# Patient Record
Sex: MALE | Race: WHITE | NOT HISPANIC OR LATINO
[De-identification: names, ages, dates, MRNs, and addresses within clinical notes are randomized per-mention and may not be internally consistent; named-entity substitution may affect disease eponyms.]

---

## 2019-04-04 ENCOUNTER — APPOINTMENT (OUTPATIENT)
Dept: PULMONOLOGY | Facility: CLINIC | Age: 74
End: 2019-04-04
Payer: MEDICARE

## 2019-04-04 VITALS
DIASTOLIC BLOOD PRESSURE: 81 MMHG | OXYGEN SATURATION: 92 % | RESPIRATION RATE: 16 BRPM | BODY MASS INDEX: 26.6 KG/M2 | HEART RATE: 76 BPM | SYSTOLIC BLOOD PRESSURE: 169 MMHG | HEIGHT: 71 IN | WEIGHT: 190 LBS | TEMPERATURE: 97.7 F

## 2019-04-04 PROCEDURE — 94727 GAS DIL/WSHOT DETER LNG VOL: CPT

## 2019-04-04 PROCEDURE — 94060 EVALUATION OF WHEEZING: CPT

## 2019-04-04 PROCEDURE — 94729 DIFFUSING CAPACITY: CPT

## 2019-04-04 PROCEDURE — 88738 HGB QUANT TRANSCUTANEOUS: CPT

## 2019-04-04 PROCEDURE — 99205 OFFICE O/P NEW HI 60 MIN: CPT | Mod: 25

## 2019-04-04 NOTE — PHYSICAL EXAM
[General Appearance - Well Developed] : well developed [Normal Appearance] : normal appearance [Well Groomed] : well groomed [General Appearance - Well Nourished] : well nourished [No Deformities] : no deformities [General Appearance - In No Acute Distress] : no acute distress [Normal Conjunctiva] : the conjunctiva exhibited no abnormalities [Eyelids - No Xanthelasma] : the eyelids demonstrated no xanthelasmas [Normal Oropharynx] : normal oropharynx [I] : I [Neck Appearance] : the appearance of the neck was normal [Neck Cervical Mass (___cm)] : no neck mass was observed [Jugular Venous Distention Increased] : there was no jugular-venous distention [Thyroid Diffuse Enlargement] : the thyroid was not enlarged [Heart Rate And Rhythm] : heart rate and rhythm were normal [Heart Sounds] : normal S1 and S2 [Murmurs] : no murmurs present [Arterial Pulses Normal] : the arterial pulses were normal [Edema] : no peripheral edema present [Veins - Varicosity Changes] : no varicosital changes were noted in the lower extremities [Respiration, Rhythm And Depth] : normal respiratory rhythm and effort [Exaggerated Use Of Accessory Muscles For Inspiration] : no accessory muscle use [Chest Palpation] : palpation of the chest revealed no abnormalities [Lungs Percussion] : the lungs were normal to percussion [Bowel Sounds] : normal bowel sounds [Abdomen Soft] : soft [Abdomen Tenderness] : non-tender [Abdomen Mass (___ Cm)] : no abdominal mass palpated [Abnormal Walk] : normal gait [Gait - Sufficient For Exercise Testing] : the gait was sufficient for exercise testing [Nail Clubbing] : no clubbing of the fingernails [Cyanosis, Localized] : no localized cyanosis [Petechial Hemorrhages (___cm)] : no petechial hemorrhages [Skin Color & Pigmentation] : normal skin color and pigmentation [Skin Turgor] : normal skin turgor [] : no rash [Deep Tendon Reflexes (DTR)] : deep tendon reflexes were 2+ and symmetric [Sensation] : the sensory exam was normal to light touch and pinprick [No Focal Deficits] : no focal deficits [Oriented To Time, Place, And Person] : oriented to person, place, and time [Impaired Insight] : insight and judgment were intact [Affect] : the affect was normal [FreeTextEntry1] : Bilateral prolonged expiratory phase with diffuse wheeze rhonchi

## 2019-04-04 NOTE — PROCEDURE
[FreeTextEntry1] : FENO 65 verbally reports to chest x-ray at [facility] clear and negative for pneumonia\par \par PFT April 4, 2019\par Severe obstructive ventilatory impairment\par 9% response to inhaled bronchodilator at FEV1\par FEV1 46% of predicted.\par Lung volumes consistent with air trapping with an RV/TLC ratio 153% of predicted.\par Severe reduction in diffusion 43% of predicted with a loss of functioning alveolar capillary units\par Hemoglobin 14.6

## 2019-04-04 NOTE — DISCUSSION/SUMMARY
[FreeTextEntry1] : COPD-emphysema\par COPD exacerbation with asthmatic component and elevated exhaled nitric oxide\par Former tobacco smoker 31-year pack history\par \par Recommendations\par PRN Ventolin for rescue therapy\par Sample added Symbicort 2 puffs twice daily with instructions to rinse\par Spiriva Respimat 2 puffs daily\par Singular 10 mg 1 tablet p.o. with food\par Evaluate 2 weeks\par Discussed low-dose CAT scan screening protocol\par  and if this persists to the bronchospasm can also consider Rast testing IgE level for completeness with a CBC\par \par

## 2019-04-04 NOTE — REVIEW OF SYSTEMS
[Reflux] : reflux [Nocturia] : nocturia [Fracture] : fracture [Back Pain] : ~T back pain [Negative] : Sleep Disorder [Trauma] : no ~T physical trauma [Kyphoscoliosis] : no kyphoscoliosis [Myalgias] : no myalgias [Arthralgias] : no arthralgias [Raynaud] : no Raynaud's phenomenon was observed [Scleroderma] : no scleroderma [Rash] : no [unfilled] rash [Itch] : no itching [Ulcerations] : no ulcerations [Telangiectasias] : no telangiectasias [FreeTextEntry7] : History of viral hepatitis B\par  [de-identified] : Prior history of herpes zoster, shingles

## 2019-04-04 NOTE — HISTORY OF PRESENT ILLNESS
[FreeTextEntry1] : Pulmonary consultation\par 73-year-old with a chief complaint of chronic significant chest congestion associated with difficulty breathing\par He states he has wheeze that is associated with shortness of breath\par Sputum that is light green in color\par No reported hemoptysis\par No fevers chills or sweats\par No lower extremity edema\par No chest pain pleuritic chest pain exertional chest pain former smoker with a approximately 31-year tobacco history\par 's he states he had a 15-year timeframe where he was not smoking cigarettes\par He informs me he started smoking the age of 27 completed tobacco discontinuation approximate 1 month ago with a 15-year in between low and a total of 31-year pack history\par Treated at [facility] with Ventolin doxycycline and finished  5-day course of prednisone\par He still has significant symptomatology as noted above\par Occupation \par He states he had pneumonia approximately 3 years ago\par Reports childhood bronchitis\par \par \par

## 2019-04-08 ENCOUNTER — RX RENEWAL (OUTPATIENT)
Age: 74
End: 2019-04-08

## 2019-04-18 ENCOUNTER — LABORATORY RESULT (OUTPATIENT)
Age: 74
End: 2019-04-18

## 2019-04-18 ENCOUNTER — APPOINTMENT (OUTPATIENT)
Dept: PULMONOLOGY | Facility: CLINIC | Age: 74
End: 2019-04-18
Payer: MEDICARE

## 2019-04-18 ENCOUNTER — NON-APPOINTMENT (OUTPATIENT)
Age: 74
End: 2019-04-18

## 2019-04-18 VITALS
HEIGHT: 71 IN | OXYGEN SATURATION: 93 % | RESPIRATION RATE: 16 BRPM | SYSTOLIC BLOOD PRESSURE: 156 MMHG | HEART RATE: 79 BPM | WEIGHT: 190 LBS | DIASTOLIC BLOOD PRESSURE: 87 MMHG | BODY MASS INDEX: 26.6 KG/M2

## 2019-04-18 DIAGNOSIS — Z87.891 PERSONAL HISTORY OF NICOTINE DEPENDENCE: ICD-10-CM

## 2019-04-18 DIAGNOSIS — E55.9 VITAMIN D DEFICIENCY, UNSPECIFIED: ICD-10-CM

## 2019-04-18 DIAGNOSIS — Z00.00 ENCOUNTER FOR GENERAL ADULT MEDICAL EXAMINATION W/OUT ABNORMAL FINDINGS: ICD-10-CM

## 2019-04-18 PROCEDURE — 95012 NITRIC OXIDE EXP GAS DETER: CPT

## 2019-04-18 PROCEDURE — G0296 VISIT TO DETERM LDCT ELIG: CPT

## 2019-04-18 PROCEDURE — 93000 ELECTROCARDIOGRAM COMPLETE: CPT

## 2019-04-18 PROCEDURE — 99397 PER PM REEVAL EST PAT 65+ YR: CPT | Mod: 25

## 2019-04-18 PROCEDURE — 94060 EVALUATION OF WHEEZING: CPT

## 2019-04-18 PROCEDURE — 36415 COLL VENOUS BLD VENIPUNCTURE: CPT

## 2019-04-19 PROBLEM — E55.9 VITAMIN D INSUFFICIENCY: Status: ACTIVE | Noted: 2019-04-19

## 2019-04-19 LAB
25(OH)D3 SERPL-MCNC: 19.4 NG/ML
ALBUMIN SERPL ELPH-MCNC: 3.9 G/DL
ALP BLD-CCNC: 68 U/L
ALT SERPL-CCNC: 48 U/L
ANION GAP SERPL CALC-SCNC: 14 MMOL/L
AST SERPL-CCNC: 33 U/L
BASOPHILS # BLD AUTO: 0.07 K/UL
BASOPHILS NFR BLD AUTO: 0.5 %
BILIRUB SERPL-MCNC: 0.5 MG/DL
BUN SERPL-MCNC: 12 MG/DL
CALCIUM SERPL-MCNC: 9.1 MG/DL
CHLORIDE SERPL-SCNC: 95 MMOL/L
CHOLEST SERPL-MCNC: 146 MG/DL
CHOLEST/HDLC SERPL: 4.7 RATIO
CO2 SERPL-SCNC: 27 MMOL/L
CREAT SERPL-MCNC: 0.83 MG/DL
EOSINOPHIL # BLD AUTO: 0.18 K/UL
EOSINOPHIL NFR BLD AUTO: 1.4 %
ESTIMATED AVERAGE GLUCOSE: 117 MG/DL
GLUCOSE SERPL-MCNC: 99 MG/DL
HBA1C MFR BLD HPLC: 5.7 %
HCT VFR BLD CALC: 41.1 %
HCV AB SER QL: ABNORMAL
HCV S/CO RATIO: 1.39 S/CO
HDLC SERPL-MCNC: 31 MG/DL
HGB BLD-MCNC: 13.9 G/DL
IMM GRANULOCYTES NFR BLD AUTO: 0.5 %
LDLC SERPL CALC-MCNC: 95 MG/DL
LYMPHOCYTES # BLD AUTO: 2.53 K/UL
LYMPHOCYTES NFR BLD AUTO: 19.8 %
MAN DIFF?: NORMAL
MCHC RBC-ENTMCNC: 30.7 PG
MCHC RBC-ENTMCNC: 33.8 GM/DL
MCV RBC AUTO: 90.7 FL
MONOCYTES # BLD AUTO: 0.81 K/UL
MONOCYTES NFR BLD AUTO: 6.3 %
NEUTROPHILS # BLD AUTO: 9.1 K/UL
NEUTROPHILS NFR BLD AUTO: 71.5 %
PLATELET # BLD AUTO: 291 K/UL
POTASSIUM SERPL-SCNC: 4 MMOL/L
PROT SERPL-MCNC: 6.9 G/DL
PSA SERPL-MCNC: 0.58 NG/ML
RBC # BLD: 4.53 M/UL
RBC # FLD: 12 %
SODIUM SERPL-SCNC: 136 MMOL/L
T4 FREE SERPL-MCNC: 1.6 NG/DL
T4 SERPL-MCNC: 9 UG/DL
TRIGL SERPL-MCNC: 99 MG/DL
TSH SERPL-ACNC: 0.6 UIU/ML
WBC # FLD AUTO: 12.76 K/UL

## 2019-05-01 ENCOUNTER — OUTPATIENT (OUTPATIENT)
Dept: OUTPATIENT SERVICES | Facility: HOSPITAL | Age: 74
LOS: 1 days | End: 2019-05-01
Payer: MEDICARE

## 2019-05-01 DIAGNOSIS — J44.1 CHRONIC OBSTRUCTIVE PULMONARY DISEASE WITH (ACUTE) EXACERBATION: ICD-10-CM

## 2019-05-01 PROCEDURE — G0297: CPT | Mod: 26

## 2019-05-01 PROCEDURE — G0297: CPT

## 2019-05-10 NOTE — REVIEW OF SYSTEMS
[Nocturia] : nocturia [Reflux] : reflux [Fracture] : fracture [Back Pain] : ~T back pain [Negative] : Sleep Disorder [Trauma] : no ~T physical trauma [Kyphoscoliosis] : no kyphoscoliosis [Myalgias] : no myalgias [Arthralgias] : no arthralgias [Raynaud] : no Raynaud's phenomenon was observed [Scleroderma] : no scleroderma [Itch] : no itching [Rash] : no [unfilled] rash [Ulcerations] : no ulcerations [Telangiectasias] : no telangiectasias [FreeTextEntry7] : History of viral hepatitis B\par  [de-identified] : Prior history of herpes zoster, shingles

## 2019-05-10 NOTE — PROCEDURE
[FreeTextEntry1] : FENO 46 ppb and prior 63 ppb\par \par Spirometry 4/18/2019\par Moderate OAD\par  No  BD at  FEV1\par Interval improvement at flow rates\par \par EKG 4/18/2019\par NSR \par r/o old anterior wall MI \par Noted no change compared EKG 2016\par \par Blood Draw\par Increase Synthroid to 50 mcg Saturday and Sunday and continue 25 mc data review April 19, 2019\par CBC White blood count 5.15 hemoglobin 12.5 hematocrit 39.4 .8\par Platelet count 256,000\par Hemoglobin A1c 5.2 with mean plasma glucose 103\par Vitamin D 25.3 data review April 19, 2019\par CBC\par White blood count just above normal 12.76 hemoglobin 13.9 hematocrit 41.1 platelet count 291,000\par Hemoglobin A1c 5.7% with mean plasma glucose 117\par HCV weakly reactive  and pending HCV RNA qualitative test - NOTED HCV RNA is Negative\par Lipid profile\par Cholesterol 146 HDL 31 triglycerides 99 LDL 95\par PSA 0.58\par T4 free T4 TSH normal\par TSH 0.60\par Vitamin D 19.4 \par Serum electrolytes are normal\par Renal function normal\par BUN 12 creatinine 0.83\par AST 33\par ALT 48\par Alkaline phosphatase bilirubin normal

## 2019-05-10 NOTE — PHYSICAL EXAM
[Normal Appearance] : normal appearance [General Appearance - Well Developed] : well developed [Well Groomed] : well groomed [No Deformities] : no deformities [General Appearance - Well Nourished] : well nourished [General Appearance - In No Acute Distress] : no acute distress [Normal Conjunctiva] : the conjunctiva exhibited no abnormalities [Eyelids - No Xanthelasma] : the eyelids demonstrated no xanthelasmas [I] : I [Normal Oropharynx] : normal oropharynx [Neck Appearance] : the appearance of the neck was normal [Neck Cervical Mass (___cm)] : no neck mass was observed [Thyroid Diffuse Enlargement] : the thyroid was not enlarged [Jugular Venous Distention Increased] : there was no jugular-venous distention [Heart Rate And Rhythm] : heart rate and rhythm were normal [Heart Sounds] : normal S1 and S2 [Murmurs] : no murmurs present [Edema] : no peripheral edema present [Arterial Pulses Normal] : the arterial pulses were normal [Veins - Varicosity Changes] : no varicosital changes were noted in the lower extremities [Exaggerated Use Of Accessory Muscles For Inspiration] : no accessory muscle use [Respiration, Rhythm And Depth] : normal respiratory rhythm and effort [Chest Palpation] : palpation of the chest revealed no abnormalities [Lungs Percussion] : the lungs were normal to percussion [Bowel Sounds] : normal bowel sounds [Abdomen Tenderness] : non-tender [Abdomen Soft] : soft [Abdomen Mass (___ Cm)] : no abdominal mass palpated [Abnormal Walk] : normal gait [Gait - Sufficient For Exercise Testing] : the gait was sufficient for exercise testing [Cyanosis, Localized] : no localized cyanosis [Nail Clubbing] : no clubbing of the fingernails [Petechial Hemorrhages (___cm)] : no petechial hemorrhages [] : no ischemic changes [Deep Tendon Reflexes (DTR)] : deep tendon reflexes were 2+ and symmetric [Sensation] : the sensory exam was normal to light touch and pinprick [Oriented To Time, Place, And Person] : oriented to person, place, and time [No Focal Deficits] : no focal deficits [Impaired Insight] : insight and judgment were intact [Affect] : the affect was normal [FreeTextEntry1] : Bilateral prolonged expiratory phase with diffuse wheeze rhonchi

## 2019-05-10 NOTE — DISCUSSION/SUMMARY
[FreeTextEntry1] : Well Visit\par  COPD- Emphysema\par Vitamin D insufficiency\par HCV positive\par Vitamin D supplementation over-the-counter 2000 units daily\par Noted weakly reactive HCV titer and pending RNA testing\par Labs pending\par  Continue \par  Symbicort 160- 4.5 mcg 1 po BID\par Spiriva 2.5 mcg 2 puffs QD\par prn ventolin ALEXANDRO\par \par Follow-up address pneumococcal vaccine discussed with patient\par Make sure he gets a yearly flu shot\par Will benefit from a shingles X vaccination when available\par \par 6 months PFT with body box\par \par Based on tobacco history and risk factors patient does qualify for low-dose CAT scan screening protocol.  Risks benefits of low-dose screening protocol discussed in detail with patient.\par All questions answered at today's visit.\par \par GI/liver referral provided to patient [place] [facility] specialist

## 2019-05-16 ENCOUNTER — APPOINTMENT (OUTPATIENT)
Dept: PULMONOLOGY | Facility: CLINIC | Age: 74
End: 2019-05-16
Payer: MEDICARE

## 2019-05-16 PROCEDURE — 99214 OFFICE O/P EST MOD 30 MIN: CPT | Mod: 25

## 2019-05-16 PROCEDURE — 94060 EVALUATION OF WHEEZING: CPT

## 2019-05-16 PROCEDURE — 95012 NITRIC OXIDE EXP GAS DETER: CPT

## 2019-05-16 NOTE — REVIEW OF SYSTEMS
[Reflux] : reflux [Fracture] : fracture [Nocturia] : nocturia [Back Pain] : ~T back pain [Negative] : Sleep Disorder [Trauma] : no ~T physical trauma [Kyphoscoliosis] : no kyphoscoliosis [Myalgias] : no myalgias [Raynaud] : no Raynaud's phenomenon was observed [Arthralgias] : no arthralgias [Scleroderma] : no scleroderma [Ulcerations] : no ulcerations [Rash] : no [unfilled] rash [Itch] : no itching [Telangiectasias] : no telangiectasias [FreeTextEntry7] : History of viral hepatitis B\par  [de-identified] : Prior history of herpes zoster, shingles

## 2019-05-16 NOTE — PHYSICAL EXAM
[General Appearance - Well Developed] : well developed [Normal Appearance] : normal appearance [Well Groomed] : well groomed [General Appearance - Well Nourished] : well nourished [No Deformities] : no deformities [General Appearance - In No Acute Distress] : no acute distress [Normal Conjunctiva] : the conjunctiva exhibited no abnormalities [Eyelids - No Xanthelasma] : the eyelids demonstrated no xanthelasmas [Normal Oropharynx] : normal oropharynx [I] : I [Neck Appearance] : the appearance of the neck was normal [Neck Cervical Mass (___cm)] : no neck mass was observed [Jugular Venous Distention Increased] : there was no jugular-venous distention [Thyroid Diffuse Enlargement] : the thyroid was not enlarged [Heart Rate And Rhythm] : heart rate and rhythm were normal [Heart Sounds] : normal S1 and S2 [Murmurs] : no murmurs present [Arterial Pulses Normal] : the arterial pulses were normal [Edema] : no peripheral edema present [Veins - Varicosity Changes] : no varicosital changes were noted in the lower extremities [Respiration, Rhythm And Depth] : normal respiratory rhythm and effort [Exaggerated Use Of Accessory Muscles For Inspiration] : no accessory muscle use [Chest Palpation] : palpation of the chest revealed no abnormalities [Lungs Percussion] : the lungs were normal to percussion [Bowel Sounds] : normal bowel sounds [Abdomen Soft] : soft [Abdomen Tenderness] : non-tender [Abdomen Mass (___ Cm)] : no abdominal mass palpated [Abnormal Walk] : normal gait [Gait - Sufficient For Exercise Testing] : the gait was sufficient for exercise testing [Nail Clubbing] : no clubbing of the fingernails [Cyanosis, Localized] : no localized cyanosis [Petechial Hemorrhages (___cm)] : no petechial hemorrhages [] : no ischemic changes [Deep Tendon Reflexes (DTR)] : deep tendon reflexes were 2+ and symmetric [Sensation] : the sensory exam was normal to light touch and pinprick [No Focal Deficits] : no focal deficits [Oriented To Time, Place, And Person] : oriented to person, place, and time [Impaired Insight] : insight and judgment were intact [Affect] : the affect was normal [FreeTextEntry1] : Bilateral prolonged expiratory phase with diffuse wheeze rhonchi with partial interval  improvement

## 2019-05-16 NOTE — PROCEDURE
[FreeTextEntry1] : FENO 75 ppb and prior 44 ppb and prior 63 ppb\par \par Spirometry 5/16/2019\par Moderate OAD\par  No  BD at  FEV1\par decline flow rates\par \par EKG 4/18/2019\par NSR \par r/o old anterior wall MI \par Noted no change compared EKG 2016\par \par Blood Draw\par Increase Synthroid to 50 mcg Saturday and Sunday and continue 25 mc data review April 19, 2019\par CBC White blood count 5.15 hemoglobin 12.5 hematocrit 39.4 .8\par Platelet count 256,000\par Hemoglobin A1c 5.2 with mean plasma glucose 103\par Vitamin D 25.3 data review April 19, 2019\par CBC\par White blood count just above normal 12.76 hemoglobin 13.9 hematocrit 41.1 platelet count 291,000\par Hemoglobin A1c 5.7% with mean plasma glucose 117\par HCV weakly reactive  and pending HCV RNA qualitative test - NOTED HCV RNA is Negative\par Lipid profile\par Cholesterol 146 HDL 31 triglycerides 99 LDL 95\par PSA 0.58\par T4 free T4 TSH normal\par TSH 0.60\par Vitamin D 19.4 \par Serum electrolytes are normal\par Renal function normal\par BUN 12 creatinine 0.83\par AST 33\par ALT 48\par Alkaline phosphatase bilirubin normal

## 2019-05-16 NOTE — DISCUSSION/SUMMARY
[FreeTextEntry1] : Hypothyroidism on synthroid\par  COPD- Emphysema\par Vitamin D insufficiency\par HCV positive with RNA NEGATIVE no indication for conult tx\par Vitamin D supplementation over-the-counter 2000 units daily\par \par Labs pending\par  Continue \par  Symbicort 160- 4.5 mcg 1 po BID\par Spiriva 2.5 mcg 2 puffs QD\par prn ventolin ALEXANDRO\par start  Singulair 10 mg which he did not take\par Follow-up address pneumococcal vaccine discussed with patient\par Make sure he gets a yearly flu shot\par Will benefit from a shingles X vaccination when available\par \par 6 months PFT with body box\par \par Based on tobacco history and risk factors patient does qualify for low-dose CAT scan screening protocol.  Risks benefits of low-dose screening protocol discussed in detail with patient.\par All questions answered at today's visit.Study Completed f/u 5/1/2019\par \par GI/liver referral provided to patient [place] [facility] specialist

## 2019-05-16 NOTE — HISTORY OF PRESENT ILLNESS
[Former] : is a former smoker [FreeTextEntry1] : Pulmonary consultation\par 73-year-old with a chief complaint of chronic significant chest congestion associated with difficulty breathing\par He states he has wheeze that is associated with shortness of breath\par Sputum that is light green in color\par No reported hemoptysis\par No fevers chills or sweats\par No lower extremity edema\par No chest pain pleuritic chest pain exertional chest pain former smoker with a approximately 31-year tobacco history\par 's he states he had a 15-year timeframe where he was not smoking cigarettes\par He informs me he started smoking the age of 27 completed tobacco discontinuation approximate 1 month ago with a 15-year in between low and a total of 31-year pack history\par Treated at first med with Ventolin doxycycline and finished  5-day course of prednisone\par He still has significant symptomatology as noted above\par Occupation \par He states he had pneumonia approximately 3 years ago\par Reports childhood bronchitis\par \par \par

## 2019-06-13 ENCOUNTER — APPOINTMENT (OUTPATIENT)
Dept: PULMONOLOGY | Facility: CLINIC | Age: 74
End: 2019-06-13

## 2019-08-26 ENCOUNTER — OUTPATIENT (OUTPATIENT)
Dept: OUTPATIENT SERVICES | Facility: HOSPITAL | Age: 74
LOS: 1 days | End: 2019-08-26
Payer: MEDICARE

## 2019-08-26 DIAGNOSIS — R91.8 OTHER NONSPECIFIC ABNORMAL FINDING OF LUNG FIELD: ICD-10-CM

## 2019-08-26 DIAGNOSIS — R93.89 ABNORMAL FINDINGS ON DIAGNOSTIC IMAGING OF OTHER SPECIFIED BODY STRUCTURES: ICD-10-CM

## 2019-08-26 PROCEDURE — 71250 CT THORAX DX C-: CPT | Mod: 26

## 2019-08-26 PROCEDURE — 71250 CT THORAX DX C-: CPT

## 2019-09-09 ENCOUNTER — RX RENEWAL (OUTPATIENT)
Age: 74
End: 2019-09-09

## 2019-09-17 ENCOUNTER — RX RENEWAL (OUTPATIENT)
Age: 74
End: 2019-09-17

## 2019-09-18 ENCOUNTER — APPOINTMENT (OUTPATIENT)
Dept: PULMONOLOGY | Facility: CLINIC | Age: 74
End: 2019-09-18
Payer: MEDICARE

## 2019-09-18 VITALS
HEART RATE: 72 BPM | RESPIRATION RATE: 16 BRPM | SYSTOLIC BLOOD PRESSURE: 170 MMHG | HEIGHT: 71 IN | WEIGHT: 190 LBS | OXYGEN SATURATION: 94 % | BODY MASS INDEX: 26.6 KG/M2 | DIASTOLIC BLOOD PRESSURE: 92 MMHG

## 2019-09-18 PROCEDURE — 94729 DIFFUSING CAPACITY: CPT

## 2019-09-18 PROCEDURE — G0009: CPT

## 2019-09-18 PROCEDURE — 90670 PCV13 VACCINE IM: CPT

## 2019-09-18 PROCEDURE — 94060 EVALUATION OF WHEEZING: CPT

## 2019-09-18 PROCEDURE — 99214 OFFICE O/P EST MOD 30 MIN: CPT | Mod: 25

## 2019-09-18 PROCEDURE — 94727 GAS DIL/WSHOT DETER LNG VOL: CPT

## 2019-09-18 RX ORDER — LEVOFLOXACIN 500 MG/1
500 TABLET, FILM COATED ORAL DAILY
Qty: 7 | Refills: 0 | Status: DISCONTINUED | COMMUNITY
Start: 2019-05-02 | End: 2019-09-18

## 2019-09-19 LAB
T4 SERPL-MCNC: 6.7 UG/DL
TSH SERPL-ACNC: 0.92 UIU/ML

## 2019-09-19 NOTE — PROCEDURE
[FreeTextEntry1] : FENO 30 ppb  and prior 73 ppb and prior 44 ppb and prior 63 ppb\par PFT September 18, 2019\par Moderate obstructive ventilatory impairment\par 90% response to bronchodilator at FEV1\par Normal lung volumes\par Moderate reduction diffusion 63% of predicted with a loss of functioning alveolocapillary units\par Compared to April 4, 2019 there is interval improvement of flow rates diffusion and stable total lung capacity\par \par EKG 4/18/2019\par NSR \par r/o old anterior wall MI \par Noted no change compared EKG 2016\par \par Blood Draw\par T4 TSH normal range \par  Synthroid to 50 mcg Saturday and Sunday and continue 25 mg data review April 19, 2019\par CBC White blood count 5.15 hemoglobin 12.5 hematocrit 39.4 .8\par Platelet count 256,000\par Hemoglobin A1c 5.2 with mean plasma glucose 103\par Vitamin D 25.3 data review April 19, 2019\par CBC\par White blood count just above normal 12.76 hemoglobin 13.9 hematocrit 41.1 platelet count 291,000\par Hemoglobin A1c 5.7% with mean plasma glucose 117\par HCV weakly reactive  and pending HCV RNA qualitative test - NOTED HCV RNA is Negative\par Lipid profile\par Cholesterol 146 HDL 31 triglycerides 99 LDL 95\par PSA 0.58\par T4 free T4 TSH normal\par TSH 0.60\par Vitamin D 19.4 \par Serum electrolytes are normal\par Renal function normal\par BUN 12 creatinine 0.83\par AST 33\par ALT 48\par Alkaline phosphatase bilirubin normal \par \par  Prevnar Im 9/18/19

## 2019-09-19 NOTE — REVIEW OF SYSTEMS
Pt has an appointment coming up soon, will discuss. [Reflux] : reflux [Nocturia] : nocturia [Fracture] : fracture [Back Pain] : ~T back pain [Negative] : Sleep Disorder [Trauma] : no ~T physical trauma [Kyphoscoliosis] : no kyphoscoliosis [Myalgias] : no myalgias [Raynaud] : no Raynaud's phenomenon was observed [Arthralgias] : no arthralgias [Scleroderma] : no scleroderma [Itch] : no itching [Rash] : no [unfilled] rash [Ulcerations] : no ulcerations [Telangiectasias] : no telangiectasias [FreeTextEntry7] : History of viral hepatitis B\par  [de-identified] : Prior history of herpes zoster, shingles

## 2019-09-19 NOTE — HISTORY OF PRESENT ILLNESS
[Former] : is a former smoker [FreeTextEntry1] : COPD with an asthmatic component\par Abnormal CT chest left upper lobe opacity suspicious for a lung neoplasm but PET CT scan nondiagnostic technically limited\par Hypothyroidism on Synthroid 25 mg 5 days/week and Saturday Sunday 50 mg daily\par \par Pulmonary consultation\par 73-year-old with a chief complaint of chronic significant chest congestion associated with difficulty breathing\par He states he has wheeze that is associated with shortness of breath\par Sputum that is light green in color\par No reported hemoptysis\par No fevers chills or sweats\par No lower extremity edema\par No chest pain pleuritic chest pain exertional chest pain former smoker with a approximately 31-year tobacco history\par 's he states he had a 15-year timeframe where he was not smoking cigarettes\par He informs me he started smoking the age of 27 completed tobacco discontinuation approximate 1 month ago with a 15-year in between low and a total of 31-year pack history\par Treated at first med with Ventolin doxycycline and finished  5-day course of prednisone\par He still has significant symptomatology as noted above\par Occupation \par He states he had pneumonia approximately 3 years ago\par Reports childhood bronchitis\par \par \par

## 2019-09-19 NOTE — DISCUSSION/SUMMARY
[FreeTextEntry1] : Left upper lobe partially cavitation 1.9 cm lesion with PET scan that was technically limited with out data are available\par CT chest completed August 26, 2019 measured the left upper lobe lesion at 2.5 cm with borderline lymphadenopathy\par r/o Lung Cancer\par Hypothyroidism on synthroid no change in dosing\par  COPD- Emphysema\par Vitamin D insufficiency\par HCV positive with RNA NEGATIVE \par Vitamin D supplementation over-the-counter 2000 units daily\par \par Labs pending\par  Continue \par  Symbicort 160- 4.5 mcg 1 po BID\par Spiriva 2.5 mcg 2 puffs QD\par prn ventolin ALEXANDRO\par \par Follow-up address pneumococcal vaccine discussed with patient\par Make sure he gets a yearly flu shot\par Will benefit from a shingles X vaccination when available\par \par 6 months PFT with body box\par \par Based on tobacco history and risk factors patient does qualify for low-dose CAT scan screening protocol.  Risks benefits of low-dose screening protocol discussed in detail with patient.\par All questions answered at today's visit.Study Completed f/u 5/1/2019\par \par GI/liver referral provided to patient [place] [facility] specialist

## 2019-09-19 NOTE — REASON FOR VISIT
[Follow-Up] : a follow-up visit [Abnormal CT Scan] : abnormal CT Scan [COPD] : COPD [FreeTextEntry2] : Hypothyroidism

## 2019-09-19 NOTE — PHYSICAL EXAM
[Well Groomed] : well groomed [General Appearance - Well Developed] : well developed [Normal Appearance] : normal appearance [General Appearance - Well Nourished] : well nourished [General Appearance - In No Acute Distress] : no acute distress [No Deformities] : no deformities [Normal Conjunctiva] : the conjunctiva exhibited no abnormalities [Eyelids - No Xanthelasma] : the eyelids demonstrated no xanthelasmas [Normal Oropharynx] : normal oropharynx [I] : I [Neck Appearance] : the appearance of the neck was normal [Neck Cervical Mass (___cm)] : no neck mass was observed [Jugular Venous Distention Increased] : there was no jugular-venous distention [Thyroid Diffuse Enlargement] : the thyroid was not enlarged [Heart Rate And Rhythm] : heart rate and rhythm were normal [Murmurs] : no murmurs present [Heart Sounds] : normal S1 and S2 [Edema] : no peripheral edema present [Arterial Pulses Normal] : the arterial pulses were normal [Veins - Varicosity Changes] : no varicosital changes were noted in the lower extremities [Respiration, Rhythm And Depth] : normal respiratory rhythm and effort [Exaggerated Use Of Accessory Muscles For Inspiration] : no accessory muscle use [Chest Palpation] : palpation of the chest revealed no abnormalities [Lungs Percussion] : the lungs were normal to percussion [Bowel Sounds] : normal bowel sounds [Abdomen Soft] : soft [Abdomen Tenderness] : non-tender [Abnormal Walk] : normal gait [Abdomen Mass (___ Cm)] : no abdominal mass palpated [Cyanosis, Localized] : no localized cyanosis [Gait - Sufficient For Exercise Testing] : the gait was sufficient for exercise testing [Nail Clubbing] : no clubbing of the fingernails [Petechial Hemorrhages (___cm)] : no petechial hemorrhages [] : no ischemic changes [Sensation] : the sensory exam was normal to light touch and pinprick [Deep Tendon Reflexes (DTR)] : deep tendon reflexes were 2+ and symmetric [No Focal Deficits] : no focal deficits [Impaired Insight] : insight and judgment were intact [Oriented To Time, Place, And Person] : oriented to person, place, and time [Affect] : the affect was normal [FreeTextEntry1] : Bilateral prolonged expiratory phase with diffuse wheeze rhonchi with partial interval  improvement

## 2019-10-08 ENCOUNTER — APPOINTMENT (OUTPATIENT)
Dept: THORACIC SURGERY | Facility: CLINIC | Age: 74
End: 2019-10-08
Payer: MEDICARE

## 2019-10-08 ENCOUNTER — APPOINTMENT (OUTPATIENT)
Dept: PULMONOLOGY | Facility: CLINIC | Age: 74
End: 2019-10-08
Payer: MEDICARE

## 2019-10-08 VITALS
OXYGEN SATURATION: 92 % | HEART RATE: 68 BPM | RESPIRATION RATE: 12 BRPM | SYSTOLIC BLOOD PRESSURE: 180 MMHG | DIASTOLIC BLOOD PRESSURE: 110 MMHG

## 2019-10-08 VITALS
RESPIRATION RATE: 17 BRPM | HEART RATE: 69 BPM | SYSTOLIC BLOOD PRESSURE: 190 MMHG | TEMPERATURE: 98.5 F | DIASTOLIC BLOOD PRESSURE: 110 MMHG | BODY MASS INDEX: 27.58 KG/M2 | OXYGEN SATURATION: 97 % | HEIGHT: 71 IN | WEIGHT: 197 LBS

## 2019-10-08 PROCEDURE — 99213 OFFICE O/P EST LOW 20 MIN: CPT

## 2019-10-08 PROCEDURE — 99205 OFFICE O/P NEW HI 60 MIN: CPT

## 2019-10-08 RX ORDER — CHOLECALCIFEROL (VITAMIN D3) 50 MCG
50 MCG TABLET ORAL
Qty: 30 | Refills: 3 | Status: DISCONTINUED | COMMUNITY
Start: 2019-04-19 | End: 2019-10-08

## 2019-10-08 RX ORDER — ALBUTEROL SULFATE 2.5 MG/3ML
(2.5 MG/3ML) SOLUTION RESPIRATORY (INHALATION)
Qty: 2 | Refills: 2 | Status: DISCONTINUED | COMMUNITY
Start: 2019-09-09 | End: 2019-10-08

## 2019-10-08 RX ORDER — MONTELUKAST 10 MG/1
10 TABLET, FILM COATED ORAL
Qty: 1 | Refills: 3 | Status: DISCONTINUED | COMMUNITY
Start: 2019-04-04 | End: 2019-10-08

## 2019-10-08 NOTE — PHYSICAL EXAM
[General Appearance - Alert] : alert [General Appearance - In No Acute Distress] : in no acute distress [Sclera] : the sclera and conjunctiva were normal [PERRL With Normal Accommodation] : pupils were equal in size, round, and reactive to light [Outer Ear] : the ears and nose were normal in appearance [Hearing Threshold Finger Rub Not Sullivan] : hearing was normal [Neck Appearance] : the appearance of the neck was normal [] : no respiratory distress [Respiration, Rhythm And Depth] : normal respiratory rhythm and effort [Heart Sounds] : normal S1 and S2 [Auscultation Breath Sounds / Voice Sounds] : lungs were clear to auscultation bilaterally [Murmurs] : no murmurs [Examination Of The Chest] : the chest was normal in appearance [Edema] : there was no peripheral edema [Abdomen Soft] : soft [Abdomen Tenderness] : non-tender [Cervical Lymph Nodes Enlarged Posterior Bilaterally] : posterior cervical [Cervical Lymph Nodes Enlarged Anterior Bilaterally] : anterior cervical [Supraclavicular Lymph Nodes Enlarged Bilaterally] : supraclavicular [Skin Color & Pigmentation] : normal skin color and pigmentation [Abnormal Walk] : normal gait [No Focal Deficits] : no focal deficits [Skin Turgor] : normal skin turgor [Oriented To Time, Place, And Person] : oriented to person, place, and time [Mood] : the mood was normal [Affect] : the affect was normal

## 2019-10-08 NOTE — HISTORY OF PRESENT ILLNESS
[FreeTextEntry1] : "i just got bad news at the surgeons office"\par \par states he will need lung surgery and this was stressful.  Found to have elevated bp and sent here from surgeons office.\par \par no chest pain/head ache/vision changes.  \par \par bp manually 180/110

## 2019-10-08 NOTE — ASSESSMENT
[FreeTextEntry1] : will start amlodipine now\par needs cardiac eval prior to lung surgery, will refer \par follow up with  next week for repeat blood pressure check.

## 2019-10-08 NOTE — PHYSICAL EXAM
[Well Groomed] : well groomed [General Appearance - In No Acute Distress] : no acute distress [Heart Sounds] : normal S1 and S2 [Neck Appearance] : the appearance of the neck was normal [] : no respiratory distress [Respiration, Rhythm And Depth] : normal respiratory rhythm and effort [Exaggerated Use Of Accessory Muscles For Inspiration] : no accessory muscle use [Nail Clubbing] : no clubbing of the fingernails [Auscultation Breath Sounds / Voice Sounds] : lungs were clear to auscultation bilaterally [Cyanosis, Localized] : no localized cyanosis

## 2019-10-11 NOTE — CONSULT LETTER
[Consult Letter:] : I had the pleasure of evaluating your patient, [unfilled]. [Please see my note below.] : Please see my note below. [Sincerely,] : Sincerely, [Consult Closing:] : Thank you very much for allowing me to participate in the care of this patient.  If you have any questions, please do not hesitate to contact me. [FreeTextEntry2] : Dr. Casey Zavala (Pulm/ref)\par  [FreeTextEntry3] : \par \par \par Wilfrid Ibarra MD, FACS \par Chief, Division of Thoracic Surgery \par Director, Minimally Invasive Thoracic Surgery \par Department of Cardiovascular and Thoracic Surgery \par [facility] \par , Cardiovascular and Thoracic Surgery

## 2019-10-11 NOTE — HISTORY OF PRESENT ILLNESS
[FreeTextEntry1] : Mr. Hernandez is a 74 year old male who presents today for evaluation of lung nodule, referred by his pulmonologist, Dr. Zavala.  He is a former smoker (1 PPD x 20 years, quit  April 2019) with a history of COPD.  He had routine lung cancer screening CT Chest in May 2019 that prompted close surveillance via CT Chest in August 2018.\par \par CT Chest on 8/26/19 reveals:\par - MIRELLA lobulated masslike opacity is demonstrating irregular margins with extension to the pleural surface measuring approximately 2.5 x 2.0 x 0.9 cm, demonstrating interval increase in size\par - Mediastinal lymphadenopathy, largest lymph node measuring up to 2.0 x 1.1 cm inferior and lateral to the left main pulmonary artery, unchanged\par - Advanced emphysematous changes \par \par PET/CT on 9/13/19 reveals:\par - PET portion of exam incomplete and only evaluated in the pelvis and lower extremities due to patient's ability to lay flat/still for exam\par \par PFTs on 9/18/19 reveals:\par - FEV1: 2.75 (78%)\par - DLCO: 16.39 (63%)\par \par Overall, he reports feeling well with occasional shortness of breath on strenuous exertion.  Otherwise, he He denies any fever, chills, cough, chest pain, hemoptysis, headache, blurry vision, or recent illness.

## 2019-10-11 NOTE — ASSESSMENT
[FreeTextEntry1] : 74 year old male, evaluation of lung nodule, ref: Dr. Zavala. CT Chest on 8/26/19 reveals MIRELLA lobulated masslike opacity is demonstrating irregular margins with extension to the pleural surface measuring approximately 2.5 x 2.0 x 0.9 cm, demonstrating interval increase in size - mediastinal lymphadenopathy, largest lymph node measuring up to 2.0 x 1.1 cm inferior and lateral to the left main pulmonary artery, unchanged.  PET/CT on 9/13/19 incomplete and only evaluated in the pelvis and lower extremities due to patient's ability to lay flat/still for exam.  PFTs on 9/18/19 reveals: FEV1: 2.75 (78%), DLCO: 16.39 (63%)\par \par Of note, he was noted to be hypertensive in the office today. Manual BP /110, Manual BP /108,  NIBP /112, manual BP verified by 2nd provider and noted to be elevated.\par \par I have reviewed the patient's medical records and diagnostic images during the time of this office visit, and I have made the following recommendation:\par 1.  Flexible Bronchscopy, Left VATS, Lung Resection\par 2.  The risks, benefits, and alternatives to the procedure were discussed with the patient at length. He verbalized understanding and is in agreement with the above treatment plan. \par 3.  Prior to the above procedure, I have asked him to obtain cardiac clearance.\par 4.  Hypertension - case discussed with Dr. Zavala who will have patient seen in his office urgently today to begin oral antihypertensive regimen. \par \par \par Written by Hien Choudhary NP, acting as a scribe for Wilfrid Marie MD.\par \par The documentation recorded by the scribe accurately reflects the service I personally performed and the decisions made by me. WILFRID MARIE MD

## 2019-10-16 ENCOUNTER — APPOINTMENT (OUTPATIENT)
Dept: PULMONOLOGY | Facility: CLINIC | Age: 74
End: 2019-10-16
Payer: MEDICARE

## 2019-10-16 VITALS — DIASTOLIC BLOOD PRESSURE: 80 MMHG | OXYGEN SATURATION: 97 % | HEART RATE: 79 BPM | SYSTOLIC BLOOD PRESSURE: 163 MMHG

## 2019-10-16 DIAGNOSIS — I10 ESSENTIAL (PRIMARY) HYPERTENSION: ICD-10-CM

## 2019-10-16 PROCEDURE — 90653 IIV ADJUVANT VACCINE IM: CPT

## 2019-10-16 PROCEDURE — 94060 EVALUATION OF WHEEZING: CPT

## 2019-10-16 PROCEDURE — 99214 OFFICE O/P EST MOD 30 MIN: CPT | Mod: 25

## 2019-10-16 PROCEDURE — G0008: CPT

## 2019-10-16 NOTE — PROCEDURE
[FreeTextEntry1] : From 2.05 to 2.62 LBiometry October 16, 2019\par Mild to moderate obstructive ventilatory impairment\par Significant interval improvement dating back to May 16 through September 18\par \par FENO 27  ppb  and prior 73 ppb and prior 44 ppb and prior 63 ppb\par PFT September 18, 2019\par Moderate obstructive ventilatory impairment\par 90% response to bronchodilator at FEV1\par Normal lung volumes\par Moderate reduction diffusion 63% of predicted with a loss of functioning alveolocapillary units\par Compared to April 4, 2019 there is interval improvement of flow rates diffusion and stable total lung capacity\par \par EKG 4/18/2019\par NSR \par r/o old anterior wall MI \par Noted no change compared EKG 2016\par \par Blood Draw\par Increase Synthroid to 50 mcg Saturday and Sunday and continue 25 mc data review April 19, 2019\par CBC White blood count 5.15 hemoglobin 12.5 hematocrit 39.4 .8\par Platelet count 256,000\par Hemoglobin A1c 5.2 with mean plasma glucose 103\par Vitamin D 25.3 data review April 19, 2019\par CBC\par White blood count just above normal 12.76 hemoglobin 13.9 hematocrit 41.1 platelet count 291,000\par Hemoglobin A1c 5.7% with mean plasma glucose 117\par HCV weakly reactive  and pending HCV RNA qualitative test - NOTED HCV RNA is Negative\par Lipid profile\par Cholesterol 146 HDL 31 triglycerides 99 LDL 95\par PSA 0.58\par T4 free T4 TSH normal\par TSH 0.60\par Vitamin D 19.4 \par Serum electrolytes are normal\par Renal function normal\par BUN 12 creatinine 0.83\par AST 33\par ALT 48\par Alkaline phosphatase bilirubin normal \par \par  Prevnar Im 9/18/19\par

## 2019-10-16 NOTE — REVIEW OF SYSTEMS
[Reflux] : reflux [Nocturia] : nocturia [Fracture] : fracture [Back Pain] : ~T back pain [Negative] : Sleep Disorder [Trauma] : no ~T physical trauma [Kyphoscoliosis] : no kyphoscoliosis [Myalgias] : no myalgias [Arthralgias] : no arthralgias [Raynaud] : no Raynaud's phenomenon was observed [Scleroderma] : no scleroderma [Rash] : no [unfilled] rash [Itch] : no itching [Ulcerations] : no ulcerations [Telangiectasias] : no telangiectasias [FreeTextEntry7] : History of viral hepatitis B\par  [de-identified] : Prior history of herpes zoster, shingles

## 2019-10-16 NOTE — DISCUSSION/SUMMARY
[FreeTextEntry1] : Hypothyroidism on synthroid\par  COPD- Emphysema\par Vitamin D insufficiency\par HCV positive with RNA NEGATIVE no indication for conult tx\par Vitamin D supplementation over-the-counter 2000 units daily\par \par \par  Continue \par  Symbicort 160- 4.5 mcg 1 po BID\par Spiriva 2.5 mcg 2 puffs QD\par prn ventolin ALEXANDRO\par \par Follow-up address pneumococcal vaccine discussed with patient\par Make sure he gets a yearly flu shot\par Will benefit from a shingles X vaccination when available\par \par 6 months PFT with body box\par \par Based on tobacco history and risk factors patient does qualify for low-dose CAT scan screening protocol.  Risks benefits of low-dose screening protocol discussed in detail with patient.\par All questions answered at today's visit.Study Completed f/u 5/1/2019\par \par GI/liver referral provided to patient Kenel L [facility] liver specialist\par \par Status post completed consultation cardiothoracic surgery at [facility]\par Patient will think about surgery\par He states he is going to seek additional evaluations at [facility] and [place]\par Recheck blood pressure 1 month

## 2019-11-13 ENCOUNTER — APPOINTMENT (OUTPATIENT)
Dept: PULMONOLOGY | Facility: CLINIC | Age: 74
End: 2019-11-13
Payer: MEDICARE

## 2019-11-13 VITALS
WEIGHT: 197 LBS | OXYGEN SATURATION: 97 % | RESPIRATION RATE: 14 BRPM | BODY MASS INDEX: 27.58 KG/M2 | DIASTOLIC BLOOD PRESSURE: 75 MMHG | SYSTOLIC BLOOD PRESSURE: 133 MMHG | HEIGHT: 71 IN | TEMPERATURE: 98.5 F | HEART RATE: 79 BPM

## 2019-11-13 DIAGNOSIS — Z23 ENCOUNTER FOR IMMUNIZATION: ICD-10-CM

## 2019-11-13 LAB — POCT - HEMOGLOBIN (HGB), QUANTITATIVE, TRANSCUTANEOUS: 10.8

## 2019-11-13 PROCEDURE — 94729 DIFFUSING CAPACITY: CPT

## 2019-11-13 PROCEDURE — 90662 IIV NO PRSV INCREASED AG IM: CPT

## 2019-11-13 PROCEDURE — 94060 EVALUATION OF WHEEZING: CPT

## 2019-11-13 PROCEDURE — 95012 NITRIC OXIDE EXP GAS DETER: CPT

## 2019-11-13 PROCEDURE — 94727 GAS DIL/WSHOT DETER LNG VOL: CPT

## 2019-11-13 PROCEDURE — G0008: CPT

## 2019-11-13 PROCEDURE — 99214 OFFICE O/P EST MOD 30 MIN: CPT | Mod: 25

## 2019-11-13 PROCEDURE — 88738 HGB QUANT TRANSCUTANEOUS: CPT

## 2019-11-13 NOTE — DISCUSSION/SUMMARY
[FreeTextEntry1] : Hypothyroidism on synthroid\par  COPD- Emphysema\par Vitamin D insufficiency\par HCV positive with RNA NEGATIVE no indication for Consult tx\par Vitamin D supplementation over-the-counter 2000 units daily\par \par \par  Continue \par  Symbicort 160- 4.5 mcg   2  puffs  BID\par Spiriva 2.5 mcg 2 puffs QD\par prn ventolin ALEXANDRO\par \par Will benefit from a shingles X vaccination when available\par \par Based on tobacco history and risk factors patient does qualify for low-dose CAT scan screening protocol.  Risks benefits of low-dose screening protocol discussed in detail with patient.\par All questions answered at today's visit.Study Completed f/u 5/1/2019\par \par [facility]- Dr Healy

## 2019-11-13 NOTE — REVIEW OF SYSTEMS
[Reflux] : reflux [Nocturia] : nocturia [Fracture] : fracture [Back Pain] : ~T back pain [Negative] : Endocrine [Trauma] : no ~T physical trauma [Kyphoscoliosis] : no kyphoscoliosis [Myalgias] : no myalgias [Arthralgias] : no arthralgias [Scleroderma] : no scleroderma [Raynaud] : no Raynaud's phenomenon was observed [Rash] : no [unfilled] rash [Telangiectasias] : no telangiectasias [Itch] : no itching [Ulcerations] : no ulcerations [FreeTextEntry7] : History of viral hepatitis B\par  [de-identified] : Prior history of herpes zoster, shingles

## 2019-11-13 NOTE — PHYSICAL EXAM
[General Appearance - Well Developed] : well developed [General Appearance - Well Nourished] : well nourished [Well Groomed] : well groomed [Normal Appearance] : normal appearance [No Deformities] : no deformities [Normal Conjunctiva] : the conjunctiva exhibited no abnormalities [General Appearance - In No Acute Distress] : no acute distress [Eyelids - No Xanthelasma] : the eyelids demonstrated no xanthelasmas [Normal Oropharynx] : normal oropharynx [Neck Appearance] : the appearance of the neck was normal [I] : I [Neck Cervical Mass (___cm)] : no neck mass was observed [Thyroid Diffuse Enlargement] : the thyroid was not enlarged [Jugular Venous Distention Increased] : there was no jugular-venous distention [Heart Sounds] : normal S1 and S2 [Heart Rate And Rhythm] : heart rate and rhythm were normal [Murmurs] : no murmurs present [Arterial Pulses Normal] : the arterial pulses were normal [Edema] : no peripheral edema present [Veins - Varicosity Changes] : no varicosital changes were noted in the lower extremities [Respiration, Rhythm And Depth] : normal respiratory rhythm and effort [Exaggerated Use Of Accessory Muscles For Inspiration] : no accessory muscle use [Chest Palpation] : palpation of the chest revealed no abnormalities [Lungs Percussion] : the lungs were normal to percussion [Abdomen Soft] : soft [Bowel Sounds] : normal bowel sounds [Abdomen Mass (___ Cm)] : no abdominal mass palpated [Abdomen Tenderness] : non-tender [Gait - Sufficient For Exercise Testing] : the gait was sufficient for exercise testing [Abnormal Walk] : normal gait [Nail Clubbing] : no clubbing of the fingernails [Cyanosis, Localized] : no localized cyanosis [] : no ischemic changes [Petechial Hemorrhages (___cm)] : no petechial hemorrhages [Deep Tendon Reflexes (DTR)] : deep tendon reflexes were 2+ and symmetric [Sensation] : the sensory exam was normal to light touch and pinprick [Oriented To Time, Place, And Person] : oriented to person, place, and time [No Focal Deficits] : no focal deficits [Affect] : the affect was normal [Impaired Insight] : insight and judgment were intact [FreeTextEntry1] : Bilateral prolonged expiratory phase with diffuse wheeze rhonchi with interval  improvement

## 2019-11-18 ENCOUNTER — APPOINTMENT (OUTPATIENT)
Dept: THORACIC SURGERY | Facility: HOSPITAL | Age: 74
End: 2019-11-18

## 2019-12-02 ENCOUNTER — RX RENEWAL (OUTPATIENT)
Age: 74
End: 2019-12-02

## 2019-12-04 ENCOUNTER — APPOINTMENT (OUTPATIENT)
Dept: PULMONOLOGY | Facility: CLINIC | Age: 74
End: 2019-12-04

## 2019-12-13 ENCOUNTER — APPOINTMENT (OUTPATIENT)
Dept: PULMONOLOGY | Facility: CLINIC | Age: 74
End: 2019-12-13
Payer: MEDICARE

## 2019-12-13 VITALS — OXYGEN SATURATION: 92 % | DIASTOLIC BLOOD PRESSURE: 75 MMHG | HEART RATE: 81 BPM | SYSTOLIC BLOOD PRESSURE: 140 MMHG

## 2019-12-13 PROCEDURE — 94060 EVALUATION OF WHEEZING: CPT

## 2019-12-13 PROCEDURE — 99214 OFFICE O/P EST MOD 30 MIN: CPT | Mod: 25

## 2019-12-13 PROCEDURE — 71046 X-RAY EXAM CHEST 2 VIEWS: CPT

## 2019-12-13 PROCEDURE — 94640 AIRWAY INHALATION TREATMENT: CPT | Mod: 59

## 2019-12-13 PROCEDURE — 95012 NITRIC OXIDE EXP GAS DETER: CPT

## 2019-12-13 NOTE — PROCEDURE
[FreeTextEntry1] : Chest x-ray PA lateral December 13, 2019\par COPD changes\par Left lower lobe parenchymal infiltrate most consistent with a left lower lobe pneumonia\par No pleural effusions\par Full left hilum\par \par Spirometry  December 13, 2019\par Severe obstructive ventilatory impairment\par 12% response to bronchodilator at the FEV1\par Significant decline in flow rates compared to earlier data\par Bronchodilator provided albuterol via nebulizer inhalational treatment\par FENO 18  ppb and   and prior 73 ppb \par PFT Nov 13 2019\par Moderate obstructive ventilatory impairment\par No  response to bronchodilator at FEV1\par Normal lung volumes\par  Mild Moderate reduction diffusion 63% of predicted with a loss of functioning alveolocapillary units\par Compared to April 4, 2019 there is interval improvement of flow rates diffusion and stable total lung capacity\par \par EKG 4/18/2019\par NSR \par r/o old anterior wall MI \par Noted no change compared EKG 2016\par \par Blood Draw\par Increase Synthroid to 50 mcg Saturday and Sunday and continue 25 mc data review April 19, 2019\par CBC White blood count 5.15 hemoglobin 12.5 hematocrit 39.4 .8\par Platelet count 256,000\par Hemoglobin A1c 5.2 with mean plasma glucose 103\par Vitamin D 25.3 data review April 19, 2019\par CBC\par White blood count just above normal 12.76 hemoglobin 13.9 hematocrit 41.1 platelet count 291,000\par Hemoglobin A1c 5.7% with mean plasma glucose 117\par HCV weakly reactive  and pending HCV RNA qualitative test - NOTED HCV RNA is Negative\par Lipid profile\par Cholesterol 146 HDL 31 triglycerides 99 LDL 95\par PSA 0.58\par T4 free T4 TSH normal\par TSH 0.60\par Vitamin D 19.4 \par Serum electrolytes are normal\par Renal function normal\par BUN 12 creatinine 0.83\par AST 33\par ALT 48\par Alkaline phosphatase bilirubin normal \par \par  Prevnar IM   9/18/19\par High-dose flu vaccination administered November 13, 2019

## 2019-12-13 NOTE — REVIEW OF SYSTEMS
[Reflux] : reflux [Fracture] : fracture [Nocturia] : nocturia [Back Pain] : ~T back pain [Negative] : Sleep Disorder [Trauma] : no ~T physical trauma [Kyphoscoliosis] : no kyphoscoliosis [Myalgias] : no myalgias [Arthralgias] : no arthralgias [Raynaud] : no Raynaud's phenomenon was observed [Scleroderma] : no scleroderma [Rash] : no [unfilled] rash [Itch] : no itching [Ulcerations] : no ulcerations [FreeTextEntry7] : History of viral hepatitis B\par  [Telangiectasias] : no telangiectasias [de-identified] : Prior history of herpes zoster, shingles

## 2019-12-13 NOTE — DISCUSSION/SUMMARY
[FreeTextEntry1] : COPD exacerbation with asthmabronchitis flare\par Cannot exclude a left lower lobe pneumonia in a patient with a likely malignancy in the left lung\par Hypothyroidism on synthroid\par  COPD- Emphysema\par Vitamin D insufficiency\par HCV positive with RNA NEGATIVE no indication for Consult tx\par Vitamin D supplementation over-the-counter 2000 units daily\par \par \par  Continue \par  Symbicort 160- 4.5 mcg   2  puffs  BID\par Spiriva 2.5 mcg 2 puffs QD\par prn ventolin ALEXANDRO\par \par Will benefit from a shingles X vaccination when available\par \par Based on tobacco history and risk factors patient does qualify for low-dose CAT scan screening protocol.  Risks benefits of low-dose screening protocol discussed in detail with patient.\par All questions answered at today's visit.Study Completed f/u 5/1/2019\par \par [facility]- Dr Healy\par \par Min protocol\par Prednisone 40 mg with acute 10 mg 3-day taper\par Doxycycline 100 mg 1 tablet p.o. twice daily\par 1 week follow-up

## 2019-12-13 NOTE — PHYSICAL EXAM
[Well Groomed] : well groomed [Normal Appearance] : normal appearance [General Appearance - Well Developed] : well developed [General Appearance - In No Acute Distress] : no acute distress [General Appearance - Well Nourished] : well nourished [No Deformities] : no deformities [Eyelids - No Xanthelasma] : the eyelids demonstrated no xanthelasmas [Normal Conjunctiva] : the conjunctiva exhibited no abnormalities [Normal Oropharynx] : normal oropharynx [I] : I [Neck Appearance] : the appearance of the neck was normal [Jugular Venous Distention Increased] : there was no jugular-venous distention [Neck Cervical Mass (___cm)] : no neck mass was observed [Thyroid Diffuse Enlargement] : the thyroid was not enlarged [Heart Rate And Rhythm] : heart rate and rhythm were normal [Heart Sounds] : normal S1 and S2 [Edema] : no peripheral edema present [Veins - Varicosity Changes] : no varicosital changes were noted in the lower extremities [Arterial Pulses Normal] : the arterial pulses were normal [Murmurs] : no murmurs present [Respiration, Rhythm And Depth] : normal respiratory rhythm and effort [Exaggerated Use Of Accessory Muscles For Inspiration] : no accessory muscle use [Chest Palpation] : palpation of the chest revealed no abnormalities [Lungs Percussion] : the lungs were normal to percussion [Abdomen Tenderness] : non-tender [Abdomen Soft] : soft [Bowel Sounds] : normal bowel sounds [Abnormal Walk] : normal gait [Abdomen Mass (___ Cm)] : no abdominal mass palpated [Nail Clubbing] : no clubbing of the fingernails [Gait - Sufficient For Exercise Testing] : the gait was sufficient for exercise testing [Cyanosis, Localized] : no localized cyanosis [Petechial Hemorrhages (___cm)] : no petechial hemorrhages [] : no ischemic changes [Deep Tendon Reflexes (DTR)] : deep tendon reflexes were 2+ and symmetric [Sensation] : the sensory exam was normal to light touch and pinprick [Impaired Insight] : insight and judgment were intact [Oriented To Time, Place, And Person] : oriented to person, place, and time [No Focal Deficits] : no focal deficits [Affect] : the affect was normal [FreeTextEntry1] : Bilateral prolonged expiratory phase with diffuse wheeze rhonchi with interval  improvement

## 2019-12-16 ENCOUNTER — RX CHANGE (OUTPATIENT)
Age: 74
End: 2019-12-16

## 2019-12-16 RX ORDER — TIOTROPIUM BROMIDE INHALATION SPRAY 3.12 UG/1
2.5 SPRAY, METERED RESPIRATORY (INHALATION)
Qty: 1 | Refills: 3 | Status: DISCONTINUED | COMMUNITY
Start: 2019-04-18 | End: 2019-12-16

## 2019-12-20 ENCOUNTER — APPOINTMENT (OUTPATIENT)
Dept: PULMONOLOGY | Facility: CLINIC | Age: 74
End: 2019-12-20
Payer: MEDICARE

## 2019-12-20 VITALS
HEART RATE: 80 BPM | HEIGHT: 71 IN | WEIGHT: 197 LBS | RESPIRATION RATE: 16 BRPM | BODY MASS INDEX: 27.58 KG/M2 | OXYGEN SATURATION: 94 % | DIASTOLIC BLOOD PRESSURE: 79 MMHG | SYSTOLIC BLOOD PRESSURE: 126 MMHG

## 2019-12-20 PROCEDURE — 95012 NITRIC OXIDE EXP GAS DETER: CPT

## 2019-12-20 PROCEDURE — 99214 OFFICE O/P EST MOD 30 MIN: CPT | Mod: 25

## 2019-12-20 PROCEDURE — 71046 X-RAY EXAM CHEST 2 VIEWS: CPT

## 2019-12-20 PROCEDURE — 94060 EVALUATION OF WHEEZING: CPT

## 2019-12-20 RX ORDER — DOXYCYCLINE HYCLATE 100 MG/1
100 CAPSULE ORAL
Qty: 14 | Refills: 0 | Status: DISCONTINUED | COMMUNITY
Start: 2019-12-13 | End: 2019-12-20

## 2019-12-20 NOTE — PROCEDURE
[FreeTextEntry1] : X-ray PA lateral December 20, 2019\par Follow-up\par Normal cardiac size\par Mild calcification aortic knob\par COPD changes\par Some suggestion of mild interval clearing of the left lower lobe but persistent abnormality noted\par \par Spirometry December 20, 2018\par Severe obstructive ventilatory impairment\par FEV1 FEC ratio 54\par No response to bronchodilator at the FEV1\par \par FENO  38  ppb \par \par Spirometry  December 13, 2019\par Severe obstructive ventilatory impairment\par 12% response to bronchodilator at the FEV1\par Significant decline in flow rates compared to earlier data\par Bronchodilator provided albuterol via nebulizer inhalational treatment\par \par PFT Nov 13 2019\par Moderate obstructive ventilatory impairment\par No  response to bronchodilator at FEV1\par Normal lung volumes\par  Mild Moderate reduction diffusion 63% of predicted with a loss of functioning alveolocapillary units\par Compared to April 4, 2019 there is interval improvement of flow rates diffusion and stable total lung capacity\par \par EKG 4/18/2019\par NSR \par r/o old anterior wall MI \par Noted no change compared EKG 2016\par \par Blood Draw\par Increase Synthroid to 50 mcg Saturday and Sunday and continue 25 mc data review April 19, 2019\par CBC White blood count 5.15 hemoglobin 12.5 hematocrit 39.4 .8\par Platelet count 256,000\par Hemoglobin A1c 5.2 with mean plasma glucose 103\par Vitamin D 25.3 data review April 19, 2019\par CBC\par White blood count just above normal 12.76 hemoglobin 13.9 hematocrit 41.1 platelet count 291,000\par Hemoglobin A1c 5.7% with mean plasma glucose 117\par HCV weakly reactive  and pending HCV RNA qualitative test - NOTED HCV RNA is Negative\par Lipid profile\par Cholesterol 146 HDL 31 triglycerides 99 LDL 95\par PSA 0.58\par T4 free T4 TSH normal\par TSH 0.60\par Vitamin D 19.4 \par Serum electrolytes are normal\par Renal function normal\par BUN 12 creatinine 0.83\par AST 33\par ALT 48\par Alkaline phosphatase bilirubin normal \par \par  Prevnar IM   9/18/19\par High-dose flu vaccination administered November 13, 2019

## 2019-12-20 NOTE — HISTORY OF PRESENT ILLNESS
[Former] : is a former smoker [FreeTextEntry1] : COPD with an asthmatic component with incomplete but significant improvement\par less cough wheeze  chest  congestion\par  sputum cleared\par \par Abnormal CT chest left upper lobe opacity suspicious for a lung neoplasm but PET CT scan nondiagnostic technically limited\par Hypothyroidism on Synthroid 25 mg 5 days/week and Saturday Sunday 50 mg daily\par \par Pulmonary consultation\par 73-year-old with a chief complaint of chronic significant chest congestion associated with difficulty breathing\par He states he has wheeze that is associated with shortness of breath\par Sputum that is light green in color\par No reported hemoptysis\par No fevers chills or sweats\par No lower extremity edema\par No chest pain pleuritic chest pain exertional chest pain former smoker with a approximately 31-year tobacco history\par 's he states he had a 15-year timeframe where he was not smoking cigarettes\par He informs me he started smoking the age of 27 completed tobacco discontinuation approximate 1 month ago with a 15-year in between low and a total of 31-year pack history\par Treated at first med with Ventolin doxycycline and finished  5-day course of prednisone\par He still has significant symptomatology as noted above\par Occupation \par He states he had pneumonia approximately 3 years ago\par Reports childhood bronchitis\par \par \par

## 2019-12-20 NOTE — PHYSICAL EXAM
[General Appearance - Well Developed] : well developed [Well Groomed] : well groomed [Normal Appearance] : normal appearance [General Appearance - Well Nourished] : well nourished [No Deformities] : no deformities [General Appearance - In No Acute Distress] : no acute distress [Normal Conjunctiva] : the conjunctiva exhibited no abnormalities [Eyelids - No Xanthelasma] : the eyelids demonstrated no xanthelasmas [Normal Oropharynx] : normal oropharynx [Neck Appearance] : the appearance of the neck was normal [I] : I [Neck Cervical Mass (___cm)] : no neck mass was observed [Jugular Venous Distention Increased] : there was no jugular-venous distention [Thyroid Diffuse Enlargement] : the thyroid was not enlarged [Heart Rate And Rhythm] : heart rate and rhythm were normal [Heart Sounds] : normal S1 and S2 [Murmurs] : no murmurs present [Arterial Pulses Normal] : the arterial pulses were normal [Edema] : no peripheral edema present [Veins - Varicosity Changes] : no varicosital changes were noted in the lower extremities [Respiration, Rhythm And Depth] : normal respiratory rhythm and effort [Exaggerated Use Of Accessory Muscles For Inspiration] : no accessory muscle use [Chest Palpation] : palpation of the chest revealed no abnormalities [Lungs Percussion] : the lungs were normal to percussion [Bowel Sounds] : normal bowel sounds [Abdomen Soft] : soft [Abdomen Tenderness] : non-tender [Abdomen Mass (___ Cm)] : no abdominal mass palpated [Abnormal Walk] : normal gait [Gait - Sufficient For Exercise Testing] : the gait was sufficient for exercise testing [Cyanosis, Localized] : no localized cyanosis [Nail Clubbing] : no clubbing of the fingernails [Deep Tendon Reflexes (DTR)] : deep tendon reflexes were 2+ and symmetric [Petechial Hemorrhages (___cm)] : no petechial hemorrhages [] : no ischemic changes [Sensation] : the sensory exam was normal to light touch and pinprick [No Focal Deficits] : no focal deficits [Oriented To Time, Place, And Person] : oriented to person, place, and time [Impaired Insight] : insight and judgment were intact [Affect] : the affect was normal [FreeTextEntry1] : Negative cervical adenopathy, no thrush

## 2019-12-20 NOTE — REVIEW OF SYSTEMS
[As Noted in HPI] : as noted in HPI [Reflux] : reflux [Nocturia] : nocturia [Fracture] : fracture [Back Pain] : ~T back pain [Negative] : Sleep Disorder [Trauma] : no ~T physical trauma [Kyphoscoliosis] : no kyphoscoliosis [Myalgias] : no myalgias [Arthralgias] : no arthralgias [Raynaud] : no Raynaud's phenomenon was observed [Itch] : no itching [Scleroderma] : no scleroderma [Rash] : no [unfilled] rash [Telangiectasias] : no telangiectasias [Ulcerations] : no ulcerations [FreeTextEntry7] : History of viral hepatitis B\par  [de-identified] : Prior history of herpes zoster, shingles

## 2019-12-20 NOTE — DISCUSSION/SUMMARY
[FreeTextEntry1] : COPD exacerbation with bronchitic flare post treatment with antibiotic and steroids still with active symptoms\par Cannot exclude a left lower lobe pneumonia in a patient with a likely malignancy pending cardiothoracic surgery left lung\par Underlying hypothyroidism remains on Synthroid\par History of vitamin D insufficiency\par History of HCV positive with RNA negative\par Remains on Symbicort 160–4.5 MCG 2 puffs twice daily\par Spiriva 2.5 MCG 2 puffs daily\par PRN Ventolin\par Change antibiotic protocol in Biaxin 500 mg XL 2 tablets daily as patient might have a metallic taste in the mouth\par We will retreat with an additional course of prednisone\par 1 to 2-week office follow-up\par We will also add Singulair for its bronchodilator \par [place]  cardiothoracic surgery Dr. Ratliff

## 2020-01-03 ENCOUNTER — APPOINTMENT (OUTPATIENT)
Dept: PULMONOLOGY | Facility: CLINIC | Age: 75
End: 2020-01-03
Payer: MEDICARE

## 2020-01-03 VITALS
DIASTOLIC BLOOD PRESSURE: 69 MMHG | RESPIRATION RATE: 14 BRPM | SYSTOLIC BLOOD PRESSURE: 115 MMHG | OXYGEN SATURATION: 92 % | HEART RATE: 83 BPM | TEMPERATURE: 99.5 F

## 2020-01-03 PROCEDURE — 94060 EVALUATION OF WHEEZING: CPT

## 2020-01-03 PROCEDURE — 71046 X-RAY EXAM CHEST 2 VIEWS: CPT

## 2020-01-03 PROCEDURE — 99213 OFFICE O/P EST LOW 20 MIN: CPT | Mod: 25

## 2020-01-03 RX ORDER — MONTELUKAST 10 MG/1
10 TABLET, FILM COATED ORAL
Qty: 30 | Refills: 3 | Status: DISCONTINUED | COMMUNITY
Start: 2019-12-20 | End: 2020-01-03

## 2020-01-03 RX ORDER — ZAFIRLUKAST 20 MG/1
20 TABLET, COATED ORAL
Qty: 60 | Refills: 3 | Status: ACTIVE | COMMUNITY
Start: 2020-01-03 | End: 1900-01-01

## 2020-01-03 NOTE — PHYSICAL EXAM
[Normal Appearance] : normal appearance [General Appearance - Well Developed] : well developed [General Appearance - Well Nourished] : well nourished [Well Groomed] : well groomed [No Deformities] : no deformities [Normal Conjunctiva] : the conjunctiva exhibited no abnormalities [General Appearance - In No Acute Distress] : no acute distress [Eyelids - No Xanthelasma] : the eyelids demonstrated no xanthelasmas [Normal Oropharynx] : normal oropharynx [Neck Appearance] : the appearance of the neck was normal [I] : I [Jugular Venous Distention Increased] : there was no jugular-venous distention [Neck Cervical Mass (___cm)] : no neck mass was observed [Thyroid Diffuse Enlargement] : the thyroid was not enlarged [Heart Rate And Rhythm] : heart rate and rhythm were normal [Heart Sounds] : normal S1 and S2 [Arterial Pulses Normal] : the arterial pulses were normal [Murmurs] : no murmurs present [Edema] : no peripheral edema present [Respiration, Rhythm And Depth] : normal respiratory rhythm and effort [Veins - Varicosity Changes] : no varicosital changes were noted in the lower extremities [Exaggerated Use Of Accessory Muscles For Inspiration] : no accessory muscle use [Chest Palpation] : palpation of the chest revealed no abnormalities [Lungs Percussion] : the lungs were normal to percussion [Abdomen Soft] : soft [Bowel Sounds] : normal bowel sounds [Abdomen Tenderness] : non-tender [Abdomen Mass (___ Cm)] : no abdominal mass palpated [Abnormal Walk] : normal gait [Nail Clubbing] : no clubbing of the fingernails [Cyanosis, Localized] : no localized cyanosis [Gait - Sufficient For Exercise Testing] : the gait was sufficient for exercise testing [] : no ischemic changes [Petechial Hemorrhages (___cm)] : no petechial hemorrhages [Deep Tendon Reflexes (DTR)] : deep tendon reflexes were 2+ and symmetric [No Focal Deficits] : no focal deficits [Oriented To Time, Place, And Person] : oriented to person, place, and time [Sensation] : the sensory exam was normal to light touch and pinprick [Affect] : the affect was normal [Impaired Insight] : insight and judgment were intact [FreeTextEntry1] : Bilateral prolonged expiratory phase with diffuse wheeze rhonchi with interval  improvement but  not  resolved

## 2020-01-03 NOTE — HISTORY OF PRESENT ILLNESS
[Former] : is a former smoker [FreeTextEntry1] : COPD with an asthmatic component with incomplete but  wax  wane sxs off steroids\par less cough wheeze  chest  congestion\par  sputum cleared\par \par Abnormal CT chest left upper lobe opacity suspicious for a lung neoplasm but PET CT scan nondiagnostic technically limited\par Hypothyroidism on Synthroid 25 mg 5 days/week and Saturday Sunday 50 mg daily\par \par Pulmonary consultation\par 73-year-old with a chief complaint of chronic significant chest congestion associated with difficulty breathing\par He states he has wheeze that is associated with shortness of breath\par Sputum that is light green in color\par No reported hemoptysis\par No fevers chills or sweats\par No lower extremity edema\par No chest pain pleuritic chest pain exertional chest pain former smoker with a approximately 31-year tobacco history\par 's he states he had a 15-year timeframe where he was not smoking cigarettes\par He informs me he started smoking the age of 27 completed tobacco discontinuation approximate 1 month ago with a 15-year in between low and a total of 31-year pack history\par Treated at [facility] with Ventolin doxycycline and finished  5-day course of prednisone\par He still has significant symptomatology as noted above\par Occupation \par He states he had pneumonia approximately 3 years ago\par Reports childhood bronchitis\par \par \par

## 2020-01-03 NOTE — REVIEW OF SYSTEMS
[As Noted in HPI] : as noted in HPI [Reflux] : reflux [Fracture] : fracture [Nocturia] : nocturia [Back Pain] : ~T back pain [Negative] : Pulmonary Hypertension [Trauma] : no ~T physical trauma [Kyphoscoliosis] : no kyphoscoliosis [Myalgias] : no myalgias [Arthralgias] : no arthralgias [Raynaud] : no Raynaud's phenomenon was observed [Scleroderma] : no scleroderma [Rash] : no [unfilled] rash [Itch] : no itching [Telangiectasias] : no telangiectasias [Ulcerations] : no ulcerations [FreeTextEntry7] : History of viral hepatitis B\par  [de-identified] : Prior history of herpes zoster, shingles

## 2020-01-03 NOTE — PROCEDURE
[FreeTextEntry1] : Chest x-ray PA lateral normal cardiac size\par Dirty lungs consistent with known COPD\par Fullness left hilum\par No clear infiltrate noted\par \par X-ray PA lateral December 20, 2019\par Follow-up\par Normal cardiac size\par Mild calcification aortic knob\par COPD changes\par Some suggestion of mild interval clearing of the left lower lobe but persistent abnormality noted\par \par Spirometry January 3, 2020\par Moderate obstructive ventilatory impairment\par No response to bronchodilator at FEV1\par Noted interval improvement at the flow rates but not returned to baseline of November 13, 2019 when FEV1 was 2.55 and now 1.93\par \par \par FENO  38  ppb \par \par Spirometry  December 13, 2019\par Severe obstructive ventilatory impairment\par 12% response to bronchodilator at the FEV1\par Significant decline in flow rates compared to earlier data\par Bronchodilator provided albuterol via nebulizer inhalational treatment\par \par PFT Nov 13 2019\par Moderate obstructive ventilatory impairment\par No  response to bronchodilator at FEV1\par Normal lung volumes\par  Mild Moderate reduction diffusion 63% of predicted with a loss of functioning alveolocapillary units\par Compared to April 4, 2019 there is interval improvement of flow rates diffusion and stable total lung capacity\par \par EKG 4/18/2019\par NSR \par r/o old anterior wall MI \par Noted no change compared EKG 2016\par \par Blood Draw\par Increase Synthroid to 50 mcg Saturday and Sunday and continue 25 mc data review April 19, 2019\par CBC White blood count 5.15 hemoglobin 12.5 hematocrit 39.4 .8\par Platelet count 256,000\par Hemoglobin A1c 5.2 with mean plasma glucose 103\par Vitamin D 25.3 data review April 19, 2019\par CBC\par White blood count just above normal 12.76 hemoglobin 13.9 hematocrit 41.1 platelet count 291,000\par Hemoglobin A1c 5.7% with mean plasma glucose 117\par HCV weakly reactive  and pending HCV RNA qualitative test - NOTED HCV RNA is Negative\par Lipid profile\par Cholesterol 146 HDL 31 triglycerides 99 LDL 95\par PSA 0.58\par T4 free T4 TSH normal\par TSH 0.60\par Vitamin D 19.4 \par Serum electrolytes are normal\par Renal function normal\par BUN 12 creatinine 0.83\par AST 33\par ALT 48\par Alkaline phosphatase bilirubin normal \par \par  Prevnar IM   9/18/19\par High-dose flu vaccination administered November 13, 2019

## 2020-01-17 ENCOUNTER — APPOINTMENT (OUTPATIENT)
Dept: PULMONOLOGY | Facility: CLINIC | Age: 75
End: 2020-01-17
Payer: MEDICARE

## 2020-01-17 VITALS
TEMPERATURE: 98.9 F | HEART RATE: 82 BPM | DIASTOLIC BLOOD PRESSURE: 73 MMHG | OXYGEN SATURATION: 94 % | SYSTOLIC BLOOD PRESSURE: 123 MMHG | RESPIRATION RATE: 16 BRPM

## 2020-01-17 PROCEDURE — 94060 EVALUATION OF WHEEZING: CPT

## 2020-01-17 PROCEDURE — 95012 NITRIC OXIDE EXP GAS DETER: CPT

## 2020-01-17 PROCEDURE — 99213 OFFICE O/P EST LOW 20 MIN: CPT | Mod: 25

## 2020-01-17 NOTE — REVIEW OF SYSTEMS
[As Noted in HPI] : as noted in HPI [Reflux] : reflux [Nocturia] : nocturia [Fracture] : fracture [Back Pain] : ~T back pain [Negative] : Sleep Disorder [Trauma] : no ~T physical trauma [Kyphoscoliosis] : no kyphoscoliosis [Myalgias] : no myalgias [Arthralgias] : no arthralgias [Raynaud] : no Raynaud's phenomenon was observed [Scleroderma] : no scleroderma [Rash] : no [unfilled] rash [Itch] : no itching [Ulcerations] : no ulcerations [Telangiectasias] : no telangiectasias [FreeTextEntry7] : History of viral hepatitis B\par  [de-identified] : Prior history of herpes zoster, shingles

## 2020-01-17 NOTE — PROCEDURE
[FreeTextEntry1] : Spirometry  January 17, 2020\par Mild to moderate obstructive ventilatory impairment\par No response to bronchodilator at the FEV1\par Since December 2 1/13 2019 noted continued interval improvement but has not reached a plateau\par \par FENO  36  ppb 1/17/20\par \par Chest x-ray PA lateral normal cardiac size Franki  3 2020\par Dirty lungs consistent with known COPD\par Fullness left hilum\par No clear infiltrate noted\par \par X-ray PA lateral December 20, 2019\par Follow-up\par Normal cardiac size\par Mild calcification aortic knob\par COPD changes\par Some suggestion of mild interval clearing of the left lower lobe but persistent abnormality noted\par \par Spirometry January 3, 2020\par Moderate obstructive ventilatory impairment\par No response to bronchodilator at FEV1\par Noted interval improvement at the flow rates but not returned to baseline of November 13, 2019 when FEV1 was 2.55 and now 1.93\par \par Spirometry  December 13, 2019\par Severe obstructive ventilatory impairment\par 12% response to bronchodilator at the FEV1\par Significant decline in flow rates compared to earlier data\par Bronchodilator provided albuterol via nebulizer inhalational treatment\par \par PFT Nov 13 2019\par Moderate obstructive ventilatory impairment\par No  response to bronchodilator at FEV1\par Normal lung volumes\par  Mild Moderate reduction diffusion 63% of predicted with a loss of functioning alveolocapillary units\par Compared to April 4, 2019 there is interval improvement of flow rates diffusion and stable total lung capacity\par \par EKG 4/18/2019\par NSR \par r/o old anterior wall MI \par Noted no change compared EKG 2016\par \par Blood Draw\par Increase Synthroid to 50 mcg Saturday and Sunday and continue 25 mc data review April 19, 2019\par CBC White blood count 5.15 hemoglobin 12.5 hematocrit 39.4 .8\par Platelet count 256,000\par Hemoglobin A1c 5.2 with mean plasma glucose 103\par Vitamin D 25.3 data review April 19, 2019\par CBC\par White blood count just above normal 12.76 hemoglobin 13.9 hematocrit 41.1 platelet count 291,000\par Hemoglobin A1c 5.7% with mean plasma glucose 117\par HCV weakly reactive  and pending HCV RNA qualitative test - NOTED HCV RNA is Negative\par Lipid profile\par Cholesterol 146 HDL 31 triglycerides 99 LDL 95\par PSA 0.58\par T4 free T4 TSH normal\par TSH 0.60\par Vitamin D 19.4 \par Serum electrolytes are normal\par Renal function normal\par BUN 12 creatinine 0.83\par AST 33\par ALT 48\par Alkaline phosphatase bilirubin normal \par \par  Prevnar IM   9/18/19\par High-dose flu vaccination administered November 13, 2019

## 2020-01-17 NOTE — PHYSICAL EXAM
[General Appearance - Well Developed] : well developed [Normal Appearance] : normal appearance [Well Groomed] : well groomed [General Appearance - Well Nourished] : well nourished [No Deformities] : no deformities [General Appearance - In No Acute Distress] : no acute distress [Normal Conjunctiva] : the conjunctiva exhibited no abnormalities [Eyelids - No Xanthelasma] : the eyelids demonstrated no xanthelasmas [Normal Oropharynx] : normal oropharynx [I] : I [Neck Appearance] : the appearance of the neck was normal [Neck Cervical Mass (___cm)] : no neck mass was observed [Jugular Venous Distention Increased] : there was no jugular-venous distention [Thyroid Diffuse Enlargement] : the thyroid was not enlarged [Heart Rate And Rhythm] : heart rate and rhythm were normal [Heart Sounds] : normal S1 and S2 [Murmurs] : no murmurs present [Arterial Pulses Normal] : the arterial pulses were normal [Edema] : no peripheral edema present [Veins - Varicosity Changes] : no varicosital changes were noted in the lower extremities [Respiration, Rhythm And Depth] : normal respiratory rhythm and effort [Exaggerated Use Of Accessory Muscles For Inspiration] : no accessory muscle use [Chest Palpation] : palpation of the chest revealed no abnormalities [Lungs Percussion] : the lungs were normal to percussion [Bowel Sounds] : normal bowel sounds [Abdomen Soft] : soft [Abdomen Tenderness] : non-tender [Abdomen Mass (___ Cm)] : no abdominal mass palpated [Abnormal Walk] : normal gait [Gait - Sufficient For Exercise Testing] : the gait was sufficient for exercise testing [Nail Clubbing] : no clubbing of the fingernails [Cyanosis, Localized] : no localized cyanosis [Petechial Hemorrhages (___cm)] : no petechial hemorrhages [] : no ischemic changes [Deep Tendon Reflexes (DTR)] : deep tendon reflexes were 2+ and symmetric [Sensation] : the sensory exam was normal to light touch and pinprick [No Focal Deficits] : no focal deficits [Oriented To Time, Place, And Person] : oriented to person, place, and time [Impaired Insight] : insight and judgment were intact [Affect] : the affect was normal [FreeTextEntry1] : Bilateral prolonged expiratory phase with diffuse wheeze rhonchi with interval  improvement

## 2020-01-17 NOTE — DISCUSSION/SUMMARY
[FreeTextEntry1] : That is post COPD flare treated with steroids\par  COPD- Emphysema\par Vitamin D insufficiency\par HCV positive with RNA NEGATIVE no indication for Consult tx\par Vitamin D supplementation over-the-counter 2000 units daily\par Hypothyroidism on  synthroid\par Currently he has been not using the sample of Incruse because Spiriva was not covered or the Symbicort\par Advised to restart Symbicort and provide with an additional sample\par Provided with samples of  Tudorza 1 puff twice daily for lama agent\par  Continue \par  Symbicort 160- 4.5 mcg   2  puffs  BID\par prn ventolin ALEXANDRO\par \par Will benefit from a shingles  vaccination when available\par \par Based on tobacco history and risk factors patient does qualify for low-dose CAT scan screening protocol.  Risks benefits of low-dose screening protocol discussed in detail with patient.\par All questions answered at today's visit.Study Completed f/u 5/1/2019\par \par [facility]- Dr Healy

## 2020-01-17 NOTE — HISTORY OF PRESENT ILLNESS
[Former] : is a former smoker [FreeTextEntry1] : COPD with an asthmatic component with incomplete but  wax  wane sxs  with slow  interval improvement\par less cough wheeze  chest  congestion\par  sputum cleared\par \par Abnormal CT chest left upper lobe opacity suspicious for a lung neoplasm but PET CT scan nondiagnostic technically limited\par Hypothyroidism on Synthroid 25 mg 5 days/week and Saturday Sunday 50 mg daily\par \par Pulmonary consultation\par 73-year-old with a chief complaint of chronic significant chest congestion associated with difficulty breathing\par He states he has wheeze that is associated with shortness of breath\par Sputum that is light green in color\par No reported hemoptysis\par No fevers chills or sweats\par No lower extremity edema\par No chest pain pleuritic chest pain exertional chest pain former smoker with a approximately 31-year tobacco history\par 's he states he had a 15-year timeframe where he was not smoking cigarettes\par He informs me he started smoking the age of 27 completed tobacco discontinuation approximate 1 month ago with a 15-year in between low and a total of 31-year pack history\par Treated at [facility] with Ventolin doxycycline and finished  5-day course of prednisone\par He still has significant symptomatology as noted above\par Occupation \par He states he had pneumonia approximately 3 years ago\par Reports childhood bronchitis\par \par \par

## 2020-02-03 ENCOUNTER — APPOINTMENT (OUTPATIENT)
Dept: PULMONOLOGY | Facility: CLINIC | Age: 75
End: 2020-02-03
Payer: MEDICARE

## 2020-02-03 VITALS
DIASTOLIC BLOOD PRESSURE: 73 MMHG | SYSTOLIC BLOOD PRESSURE: 128 MMHG | HEART RATE: 82 BPM | OXYGEN SATURATION: 94 % | TEMPERATURE: 97.4 F

## 2020-02-03 PROCEDURE — 94060 EVALUATION OF WHEEZING: CPT

## 2020-02-03 PROCEDURE — 99214 OFFICE O/P EST MOD 30 MIN: CPT | Mod: 25

## 2020-02-03 PROCEDURE — 95012 NITRIC OXIDE EXP GAS DETER: CPT

## 2020-02-03 RX ORDER — UMECLIDINIUM 62.5 UG/1
62.5 AEROSOL, POWDER ORAL DAILY
Qty: 1 | Refills: 3 | Status: DISCONTINUED | COMMUNITY
Start: 2019-12-16 | End: 2020-02-03

## 2020-02-03 RX ORDER — PREDNISONE 10 MG/1
10 TABLET ORAL
Qty: 30 | Refills: 1 | Status: DISCONTINUED | COMMUNITY
Start: 2020-01-03 | End: 2020-02-03

## 2020-02-03 RX ORDER — CLARITHROMYCIN 500 MG/1
500 TABLET, FILM COATED, EXTENDED RELEASE ORAL DAILY
Qty: 14 | Refills: 0 | Status: DISCONTINUED | COMMUNITY
Start: 2019-12-20 | End: 2020-02-03

## 2020-02-03 NOTE — PROCEDURE
[FreeTextEntry1] : \par Spirometry February 3, 2020\par Moderate obstructive ventilatory impairment\par No bronchodilator response at FEV1\par Patient treated with a nebulized albuterol 3 cc unit dose\par Noted decline from January 17 at the FEV1 from 2.24 to 1.89 L spirometry  January 17, 2020\par \par FENO 50 ppb 2/3/20 and prior  36  ppb 1/17/20\par \par Chest x-ray PA lateral normal cardiac size Franki  3 2020\par Dirty lungs consistent with known COPD\par Fullness left hilum\par No clear infiltrate noted\par \par X-ray PA lateral December 20, 2019\par Follow-up\par Normal cardiac size\par Mild calcification aortic knob\par COPD changes\par Some suggestion of mild interval clearing of the left lower lobe but persistent abnormality noted\par \par Spirometry January 3, 2020\par Moderate obstructive ventilatory impairment\par No response to bronchodilator at FEV1\par Noted interval improvement at the flow rates but not returned to baseline of November 13, 2019 when FEV1 was 2.55 and now 1.93\par \par Spirometry  December 13, 2019\par Severe obstructive ventilatory impairment\par 12% response to bronchodilator at the FEV1\par Significant decline in flow rates compared to earlier data\par Bronchodilator provided albuterol via nebulizer inhalational treatment\par \par PFT Nov 13 2019\par Moderate obstructive ventilatory impairment\par No  response to bronchodilator at FEV1\par Normal lung volumes\par  Mild Moderate reduction diffusion 63% of predicted with a loss of functioning alveolocapillary units\par Compared to April 4, 2019 there is interval improvement of flow rates diffusion and stable total lung capacity\par \par EKG 4/18/2019\par NSR \par r/o old anterior wall MI \par Noted no change compared EKG 2016\par \par Blood Draw\par Increase Synthroid to 50 mcg Saturday and Sunday and continue 25 mc data review April 19, 2019\par CBC White blood count 5.15 hemoglobin 12.5 hematocrit 39.4 .8\par Platelet count 256,000\par Hemoglobin A1c 5.2 with mean plasma glucose 103\par Vitamin D 25.3 data review April 19, 2019\par CBC\par White blood count just above normal 12.76 hemoglobin 13.9 hematocrit 41.1 platelet count 291,000\par Hemoglobin A1c 5.7% with mean plasma glucose 117\par HCV weakly reactive  and pending HCV RNA qualitative test - NOTED HCV RNA is Negative\par Lipid profile\par Cholesterol 146 HDL 31 triglycerides 99 LDL 95\par PSA 0.58\par T4 free T4 TSH normal\par TSH 0.60\par Vitamin D 19.4 \par Serum electrolytes are normal\par Renal function normal\par BUN 12 creatinine 0.83\par AST 33\par ALT 48\par Alkaline phosphatase bilirubin normal \par \par  Prevnar IM   9/18/19\par High-dose flu vaccination administered November 13, 2019

## 2020-02-03 NOTE — DISCUSSION/SUMMARY
[FreeTextEntry1] :  post COPD flare treated with steroids but incomplete  recovery\par  COPD- Emphysema\par Vitamin D insufficiency\par HCV positive with RNA NEGATIVE no indication for Consult tx\par Vitamin D supplementation over-the-counter 2000 units daily\par Hypothyroidism on  synthroid\par Currently he has been not using the sample of Incruse because Spiriva was not covered or the Symbicort\par Advised to restart Symbicort and provide with an additional sample\par Provided with samples Spiriva  2 puff twice daily for lama agent because not using Incruse\par reorder steroids\par Trial with samples provided Daliresp 250 mg 1 tablet p.o. daily\par  Symbicort 160- 4.5 mcg   2  puffs  BID\par prn ventolin ALEXANDRO\par \par Will benefit from a shingles  vaccination when available\par \par Based on tobacco history and risk factors patient does qualify for low-dose CAT scan screening protocol.  Risks benefits of low-dose screening protocol discussed in detail with patient.\par All questions answered at today's visit.Study Completed f/u 5/1/2019\par \par [facility]- Dr Healy

## 2020-02-03 NOTE — REVIEW OF SYSTEMS
[As Noted in HPI] : as noted in HPI [Reflux] : reflux [Nocturia] : nocturia [Fracture] : fracture [Back Pain] : ~T back pain [Negative] : Sleep Disorder [Trauma] : no ~T physical trauma [Kyphoscoliosis] : no kyphoscoliosis [Arthralgias] : no arthralgias [Raynaud] : no Raynaud's phenomenon was observed [Myalgias] : no myalgias [Scleroderma] : no scleroderma [Rash] : no [unfilled] rash [Ulcerations] : no ulcerations [Itch] : no itching [Telangiectasias] : no telangiectasias [FreeTextEntry7] : History of viral hepatitis B\par  [de-identified] : Prior history of herpes zoster, shingles

## 2020-02-03 NOTE — HISTORY OF PRESENT ILLNESS
[Former] : is a former smoker [FreeTextEntry1] : COPD with an asthmatic component with incomplete but  wax  wane sxs  with slow  interval improvement\par less cough wheeze  chest  congestion\par  sputum cleared\par \par Abnormal CT chest left upper lobe opacity suspicious for a lung neoplasm but PET CT scan nondiagnostic technically limited\par Hypothyroidism on Synthroid 25 mg 5 days/week and Saturday Sunday 50 mg daily\par \par Pulmonary consultation\par 73-year-old with a chief complaint of chronic significant chest congestion associated with difficulty breathing\par He states he has wheeze that is associated with shortness of breath\par Sputum that is light green in color\par No reported hemoptysis\par No fevers chills or sweats\par No lower extremity edema\par No chest pain pleuritic chest pain exertional chest pain former smoker with a approximately 31-year tobacco history\par 's he states he had a 15-year timeframe where he was not smoking cigarettes\par He informs me he started smoking the age of 27 completed tobacco discontinuation approximate 1 month ago with a 15-year in between low and a total of 31-year pack history\par [facility] doxycycline and finished  5-day course of prednisone\par He still has significant symptomatology as noted above\par Occupation \par He states he had pneumonia approximately 3 years ago\par Reports childhood bronchitis\par \par \par  [TextBox_4] : Overall not feeling any better. Coughing, wheezing, SOB. Using Symbicort daily and Ventolin prn.

## 2020-02-03 NOTE — PHYSICAL EXAM
[Normal Appearance] : normal appearance [General Appearance - Well Developed] : well developed [Well Groomed] : well groomed [General Appearance - Well Nourished] : well nourished [No Deformities] : no deformities [Normal Conjunctiva] : the conjunctiva exhibited no abnormalities [General Appearance - In No Acute Distress] : no acute distress [Eyelids - No Xanthelasma] : the eyelids demonstrated no xanthelasmas [Normal Oropharynx] : normal oropharynx [I] : I [Neck Appearance] : the appearance of the neck was normal [Jugular Venous Distention Increased] : there was no jugular-venous distention [Neck Cervical Mass (___cm)] : no neck mass was observed [Thyroid Diffuse Enlargement] : the thyroid was not enlarged [Heart Sounds] : normal S1 and S2 [Heart Rate And Rhythm] : heart rate and rhythm were normal [Murmurs] : no murmurs present [Arterial Pulses Normal] : the arterial pulses were normal [Veins - Varicosity Changes] : no varicosital changes were noted in the lower extremities [Edema] : no peripheral edema present [Respiration, Rhythm And Depth] : normal respiratory rhythm and effort [Exaggerated Use Of Accessory Muscles For Inspiration] : no accessory muscle use [Chest Palpation] : palpation of the chest revealed no abnormalities [Lungs Percussion] : the lungs were normal to percussion [Bowel Sounds] : normal bowel sounds [Abdomen Tenderness] : non-tender [Abdomen Soft] : soft [Abdomen Mass (___ Cm)] : no abdominal mass palpated [Abnormal Walk] : normal gait [Nail Clubbing] : no clubbing of the fingernails [Gait - Sufficient For Exercise Testing] : the gait was sufficient for exercise testing [Petechial Hemorrhages (___cm)] : no petechial hemorrhages [Cyanosis, Localized] : no localized cyanosis [] : no ischemic changes [Deep Tendon Reflexes (DTR)] : deep tendon reflexes were 2+ and symmetric [No Focal Deficits] : no focal deficits [Sensation] : the sensory exam was normal to light touch and pinprick [Impaired Insight] : insight and judgment were intact [Oriented To Time, Place, And Person] : oriented to person, place, and time [Affect] : the affect was normal [FreeTextEntry1] : Bilateral prolonged expiratory phase with diffuse wheeze rhonchi with interval  improvement

## 2020-02-19 ENCOUNTER — APPOINTMENT (OUTPATIENT)
Dept: PULMONOLOGY | Facility: CLINIC | Age: 75
End: 2020-02-19

## 2020-02-24 ENCOUNTER — APPOINTMENT (OUTPATIENT)
Dept: PULMONOLOGY | Facility: CLINIC | Age: 75
End: 2020-02-24

## 2020-03-03 RX ORDER — BUDESONIDE AND FORMOTEROL FUMARATE DIHYDRATE 160; 4.5 UG/1; UG/1
160-4.5 AEROSOL RESPIRATORY (INHALATION)
Qty: 1 | Refills: 3 | Status: ACTIVE | COMMUNITY
Start: 2019-04-18 | End: 1900-01-01

## 2020-03-13 ENCOUNTER — APPOINTMENT (OUTPATIENT)
Dept: PULMONOLOGY | Facility: CLINIC | Age: 75
End: 2020-03-13
Payer: MEDICARE

## 2020-03-13 ENCOUNTER — LABORATORY RESULT (OUTPATIENT)
Age: 75
End: 2020-03-13

## 2020-03-13 VITALS
OXYGEN SATURATION: 91 % | HEART RATE: 80 BPM | TEMPERATURE: 98.1 F | DIASTOLIC BLOOD PRESSURE: 87 MMHG | SYSTOLIC BLOOD PRESSURE: 132 MMHG | RESPIRATION RATE: 17 BRPM

## 2020-03-13 LAB
BASOPHILS # BLD AUTO: 0.07 K/UL
BASOPHILS NFR BLD AUTO: 0.8 %
EOSINOPHIL # BLD AUTO: 0.33 K/UL
EOSINOPHIL NFR BLD AUTO: 3.6 %
HCT VFR BLD CALC: 44.4 %
HGB BLD-MCNC: 15.1 G/DL
IMM GRANULOCYTES NFR BLD AUTO: 0.2 %
LYMPHOCYTES # BLD AUTO: 1.62 K/UL
LYMPHOCYTES NFR BLD AUTO: 17.6 %
MAN DIFF?: NORMAL
MCHC RBC-ENTMCNC: 30.4 PG
MCHC RBC-ENTMCNC: 34 GM/DL
MCV RBC AUTO: 89.5 FL
MONOCYTES # BLD AUTO: 0.65 K/UL
MONOCYTES NFR BLD AUTO: 7 %
NEUTROPHILS # BLD AUTO: 6.54 K/UL
NEUTROPHILS NFR BLD AUTO: 70.8 %
PLATELET # BLD AUTO: 185 K/UL
RBC # BLD: 4.96 M/UL
RBC # FLD: 12.4 %
WBC # FLD AUTO: 9.23 K/UL

## 2020-03-13 PROCEDURE — 36415 COLL VENOUS BLD VENIPUNCTURE: CPT

## 2020-03-13 PROCEDURE — 94727 GAS DIL/WSHOT DETER LNG VOL: CPT

## 2020-03-13 PROCEDURE — 94060 EVALUATION OF WHEEZING: CPT

## 2020-03-13 PROCEDURE — 95012 NITRIC OXIDE EXP GAS DETER: CPT

## 2020-03-13 PROCEDURE — 94729 DIFFUSING CAPACITY: CPT

## 2020-03-13 PROCEDURE — 99214 OFFICE O/P EST MOD 30 MIN: CPT | Mod: 25

## 2020-03-13 RX ORDER — ROFLUMILAST 250 UG/1
250 TABLET ORAL
Qty: 1 | Refills: 0 | Status: DISCONTINUED | COMMUNITY
Start: 2020-02-03 | End: 2020-03-13

## 2020-03-13 RX ORDER — BECLOMETHASONE DIPROPIONATE HFA 80 UG/1
80 AEROSOL, METERED RESPIRATORY (INHALATION)
Qty: 1 | Refills: 3 | Status: ACTIVE | COMMUNITY
Start: 2020-03-13 | End: 1900-01-01

## 2020-03-13 NOTE — PROCEDURE
[FreeTextEntry1] : PFT March 13, 2020 moderate obstructive ventilatory impairment\par No bronchodilator response at FEV1\par Air trapping with RV/TLC ratio 156% of predicted.\par Diffusion 79% of predicted.\par Stable flow rates but noted significant interval improvement dating back to December 20, 2019\par \par FENO 57  ppb chest with bronchial inflammation\par \par Chest x-ray PA lateral normal cardiac size Franki  3 2020\par Dirty lungs consistent with known COPD\par Fullness left hilum\par No clear infiltrate noted\par \par X-ray PA lateral December 20, 2019\par Follow-up\par Normal cardiac size\par Mild calcification aortic knob\par COPD changes\par Some suggestion of mild interval clearing of the left lower lobe but persistent abnormality noted\par \par \par Spirometry  December 13, 2019\par Severe obstructive ventilatory impairment\par 12% response to bronchodilator at the FEV1\par Significant decline in flow rates compared to earlier data\par Bronchodilator provided albuterol via nebulizer inhalational treatment\par \par PFT Nov 13 2019\par Moderate obstructive ventilatory impairment\par No  response to bronchodilator at FEV1\par Normal lung volumes\par  Mild Moderate reduction diffusion 63% of predicted with a loss of functioning alveolocapillary units\par Compared to April 4, 2019 there is interval improvement of flow rates diffusion and stable total lung capacity\par \par EKG 4/18/2019\par NSR \par r/o old anterior wall MI \par Noted no change compared EKG 2016\par \par Blood Draw\par Increase Synthroid to 50 mcg Saturday and Sunday and continue 25 mc data review April 19, 2019\par CBC White blood count 5.15 hemoglobin 12.5 hematocrit 39.4 .8\par Platelet count 256,000\par Hemoglobin A1c 5.2 with mean plasma glucose 103\par Vitamin D 25.3 data review April 19, 2019\par CBC\par White blood count just above normal 12.76 hemoglobin 13.9 hematocrit 41.1 platelet count 291,000\par Hemoglobin A1c 5.7% with mean plasma glucose 117\par HCV weakly reactive  and pending HCV RNA qualitative test - NOTED HCV RNA is Negative\par Lipid profile\par Cholesterol 146 HDL 31 triglycerides 99 LDL 95\par PSA 0.58\par T4 free T4 TSH normal\par TSH 0.60\par Vitamin D 19.4 \par Serum electrolytes are normal\par Renal function normal\par BUN 12 creatinine 0.83\par AST 33\par ALT 48\par Alkaline phosphatase bilirubin normal \par \par  Prevnar IM   9/18/19\par High-dose flu vaccination administered November 13, 2019

## 2020-03-13 NOTE — REVIEW OF SYSTEMS
[As Noted in HPI] : as noted in HPI [Reflux] : reflux [Nocturia] : nocturia [Fracture] : fracture [Back Pain] : ~T back pain [Negative] : Sleep Disorder [Trauma] : no ~T physical trauma [Kyphoscoliosis] : no kyphoscoliosis [Myalgias] : no myalgias [Arthralgias] : no arthralgias [Raynaud] : no Raynaud's phenomenon was observed [Scleroderma] : no scleroderma [Rash] : no [unfilled] rash [Itch] : no itching [Ulcerations] : no ulcerations [Telangiectasias] : no telangiectasias [FreeTextEntry7] : History of viral hepatitis B\par  [de-identified] : Prior history of herpes zoster, shingles

## 2020-03-13 NOTE — HISTORY OF PRESENT ILLNESS
[Former] : is a former smoker [TextBox_4] : Overall not feeling any better. Coughing, wheezing, SOB. Using Symbicort daily and Ventolin prn.  [FreeTextEntry1] : COPD with an asthmatic component with incomplete but  wax  wane sxs  with slow  interval improvement\par less cough wheeze  chest  congestion\par  sputum cleared\par still pending lung  resection at [place]\par \par Abnormal CT chest left upper lobe opacity suspicious for a lung neoplasm but PET CT scan nondiagnostic technically limited\par Hypothyroidism on Synthroid 25 mg 5 days/week and Saturday Sunday 50 mg daily\par \par Pulmonary consultation\par 73-year-old with a chief complaint of chronic significant chest congestion associated with difficulty breathing\par He states he has wheeze that is associated with shortness of breath\par Sputum that is light green in color\par No reported hemoptysis\par No fevers chills or sweats\par No lower extremity edema\par No chest pain pleuritic chest pain exertional chest pain former smoker with a approximately 31-year tobacco history\par 's he states he had a 15-year timeframe where he was not smoking cigarettes\par He informs me he started smoking the age of 27 completed tobacco discontinuation approximate 1 month ago with a 15-year in between low and a total of 31-year pack history\par [facility] doxycycline and finished  5-day course of prednisone\par He still has significant symptomatology as noted above\par Occupation \par He states he had pneumonia approximately 3 years ago\par Reports childhood bronchitis\par \par \par

## 2020-03-13 NOTE — DISCUSSION/SUMMARY
[FreeTextEntry1] :  post COPD flare treated with steroids but incomplete  recovery\par  COPD- Emphysema\par Vitamin D insufficiency\par HCV positive with RNA NEGATIVE no indication for Consult tx\par Vitamin D supplementation over-the-counter 2000 units daily\par Hypothyroidism on  synthroid\par Symbicort and Spiriva\par Provided with samples Spiriva  2 puff twice daily for lama agent because not using Incruse\par reorder steroids- completed steroids\par  Symbicort 160- 4.5 mcg   2  puffs  BID\par prn ventolin ALEXANDRO\par \par Will benefit from a shingles  vaccination when available\par \par Based on tobacco history and risk factors patient does qualify for low-dose CAT scan screening protocol.  Risks benefits of low-dose screening protocol discussed in detail with patient.\par All questions answered at today's visit.Study Completed f/u 5/1/2019\par \par [facility], [facility]- Dr Healy\par  issue of biologic agents asthma panel profile CBC IgE level eosinophils pending

## 2020-03-17 LAB
A ALTERNATA IGE QN: <0.1 KUA/L
A FLAVUS AB FLD QL: NEGATIVE
A FUMIGATUS AB FLD QL: NEGATIVE
A FUMIGATUS IGE QN: <0.1 KUA/L
A NIGER AB FLD QL: NEGATIVE
C ALBICANS IGE QN: <0.1 KUA/L
C HERBARUM IGE QN: <0.1 KUA/L
CAT DANDER IGE QN: <0.1 KUA/L
CLAM IGE QN: <0.1 KUA/L
CODFISH IGE QN: <0.1 KUA/L
COMMON RAGWEED IGE QN: <0.1 KUA/L
CORN IGE QN: <0.1 KUA/L
COW MILK IGE QN: <0.1 KUA/L
D FARINAE IGE QN: <0.1 KUA/L
D PTERONYSS IGE QN: <0.1 KUA/L
DEPRECATED A ALTERNATA IGE RAST QL: 0
DEPRECATED A FUMIGATUS IGE RAST QL: 0
DEPRECATED C ALBICANS IGE RAST QL: 0
DEPRECATED C HERBARUM IGE RAST QL: 0
DEPRECATED CAT DANDER IGE RAST QL: 0
DEPRECATED CLAM IGE RAST QL: 0
DEPRECATED CODFISH IGE RAST QL: 0
DEPRECATED COMMON RAGWEED IGE RAST QL: 0
DEPRECATED CORN IGE RAST QL: 0
DEPRECATED COW MILK IGE RAST QL: 0
DEPRECATED D FARINAE IGE RAST QL: 0
DEPRECATED D PTERONYSS IGE RAST QL: 0
DEPRECATED DOG DANDER IGE RAST QL: 0
DEPRECATED EGG WHITE IGE RAST QL: 0
DEPRECATED M RACEMOSUS IGE RAST QL: 0
DEPRECATED PEANUT IGE RAST QL: 0
DEPRECATED ROACH IGE RAST QL: NORMAL
DEPRECATED SCALLOP IGE RAST QL: <0.1 KUA/L
DEPRECATED SESAME SEED IGE RAST QL: 0
DEPRECATED SHRIMP IGE RAST QL: 0
DEPRECATED SOYBEAN IGE RAST QL: 0
DEPRECATED TIMOTHY IGE RAST QL: 0
DEPRECATED WALNUT IGE RAST QL: 0
DEPRECATED WHEAT IGE RAST QL: 0
DEPRECATED WHITE OAK IGE RAST QL: 0
DOG DANDER IGE QN: <0.1 KUA/L
EGG WHITE IGE QN: <0.1 KUA/L
M RACEMOSUS IGE QN: <0.1 KUA/L
PEANUT IGE QN: <0.1 KUA/L
ROACH IGE QN: 0.14 KUA/L
SCALLOP IGE QN: 0
SCALLOP IGE QN: <0.1 KUA/L
SESAME SEED IGE QN: <0.1 KUA/L
SOYBEAN IGE QN: <0.1 KUA/L
TIMOTHY IGE QN: <0.1 KUA/L
WALNUT IGE QN: <0.1 KUA/L
WHEAT IGE QN: <0.1 KUA/L
WHITE OAK IGE QN: <0.1 KUA/L

## 2020-03-27 ENCOUNTER — APPOINTMENT (OUTPATIENT)
Dept: PULMONOLOGY | Facility: CLINIC | Age: 75
End: 2020-03-27
Payer: MEDICARE

## 2020-03-27 VITALS
HEIGHT: 71 IN | OXYGEN SATURATION: 96 % | BODY MASS INDEX: 25.9 KG/M2 | HEART RATE: 81 BPM | WEIGHT: 185 LBS | TEMPERATURE: 97.9 F | SYSTOLIC BLOOD PRESSURE: 146 MMHG | DIASTOLIC BLOOD PRESSURE: 86 MMHG

## 2020-03-27 PROCEDURE — 96372 THER/PROPH/DIAG INJ SC/IM: CPT

## 2020-03-27 PROCEDURE — 99214 OFFICE O/P EST MOD 30 MIN: CPT | Mod: 25

## 2020-03-27 RX ORDER — BENRALIZUMAB 30 MG/ML
30 INJECTION, SOLUTION SUBCUTANEOUS
Qty: 0 | Refills: 0 | Status: COMPLETED | OUTPATIENT
Start: 2020-03-27

## 2020-03-27 RX ADMIN — BENRALIZUMAB 30 MG/ML: 30 INJECTION, SOLUTION SUBCUTANEOUS at 00:00

## 2020-03-27 NOTE — REVIEW OF SYSTEMS
[As Noted in HPI] : as noted in HPI [Reflux] : reflux [Nocturia] : nocturia [Fracture] : fracture [Back Pain] : ~T back pain [Negative] : Sleep Disorder [Trauma] : no ~T physical trauma [Kyphoscoliosis] : no kyphoscoliosis [Myalgias] : no myalgias [Arthralgias] : no arthralgias [Raynaud] : no Raynaud's phenomenon was observed [Scleroderma] : no scleroderma [Rash] : no [unfilled] rash [Itch] : no itching [Ulcerations] : no ulcerations [Telangiectasias] : no telangiectasias [FreeTextEntry7] : History of viral hepatitis B\par  [de-identified] : Prior history of herpes zoster, shingles

## 2020-03-27 NOTE — PHYSICAL EXAM
[General Appearance - Well Developed] : well developed [Normal Appearance] : normal appearance [Well Groomed] : well groomed [General Appearance - Well Nourished] : well nourished [No Deformities] : no deformities [General Appearance - In No Acute Distress] : no acute distress [Normal Conjunctiva] : the conjunctiva exhibited no abnormalities [Eyelids - No Xanthelasma] : the eyelids demonstrated no xanthelasmas [Normal Oropharynx] : normal oropharynx [I] : I [Neck Appearance] : the appearance of the neck was normal [Neck Cervical Mass (___cm)] : no neck mass was observed [Jugular Venous Distention Increased] : there was no jugular-venous distention [Thyroid Diffuse Enlargement] : the thyroid was not enlarged [Heart Rate And Rhythm] : heart rate and rhythm were normal [Heart Sounds] : normal S1 and S2 [Murmurs] : no murmurs present [Arterial Pulses Normal] : the arterial pulses were normal [Edema] : no peripheral edema present [Veins - Varicosity Changes] : no varicosital changes were noted in the lower extremities [Respiration, Rhythm And Depth] : normal respiratory rhythm and effort [Exaggerated Use Of Accessory Muscles For Inspiration] : no accessory muscle use [Chest Palpation] : palpation of the chest revealed no abnormalities [Lungs Percussion] : the lungs were normal to percussion [Bowel Sounds] : normal bowel sounds [Abdomen Soft] : soft [Abdomen Tenderness] : non-tender [Abdomen Mass (___ Cm)] : no abdominal mass palpated [Abnormal Walk] : normal gait [Gait - Sufficient For Exercise Testing] : the gait was sufficient for exercise testing [Nail Clubbing] : no clubbing of the fingernails [Cyanosis, Localized] : no localized cyanosis [Petechial Hemorrhages (___cm)] : no petechial hemorrhages [] : no ischemic changes [Deep Tendon Reflexes (DTR)] : deep tendon reflexes were 2+ and symmetric [Sensation] : the sensory exam was normal to light touch and pinprick [No Focal Deficits] : no focal deficits [Oriented To Time, Place, And Person] : oriented to person, place, and time [Impaired Insight] : insight and judgment were intact [Affect] : the affect was normal [FreeTextEntry1] : Bilateral prolonged expiratory phase with diffuse wheeze rhonchi with interval  improvement and no wheeze just prolonged expiratory phase

## 2020-03-27 NOTE — REASON FOR VISIT
[Follow-Up] : a follow-up visit [COPD] : COPD [Wheezing] : wheezing [TextBox_44] : and 1st dose of Fasenra

## 2020-03-27 NOTE — DISCUSSION/SUMMARY
[FreeTextEntry1] :  post COPD flare treated with steroids but incomplete  recovery\par  COPD- Emphysema\par Vitamin D insufficiency\par HCV positive with RNA NEGATIVE no indication for Consult tx\par Vitamin D supplementation over-the-counter 2000 units daily\par Hypothyroidism on  synthroid\par Symbicort and Spiriva\par Provided with samples Spiriva  2 puff twice daily for lama agent because not using Incruse\par reorder steroids- completed steroids\par  Symbicort 160- 4.5 mcg   2  puffs  BID\par prn ventolin ALEXANDRO\par \par [place] [facility]- Dr Healy -cardiothoracic surgical timing has been on hold and will address this a repeat CT CHEST before any additional intervention NEXT MONTH\par \par  issue of biologic agents asthma panel profile CBC IgE level eosinophils reviewed\par 1 st dose Fansara  administered

## 2020-03-27 NOTE — HISTORY OF PRESENT ILLNESS
[Former] : is a former smoker [TextBox_4] : Overall not feeling any better. Coughing, wheezing, SOB. Using Symbicort daily and Ventolin prn.  [FreeTextEntry1] : COPD with an asthmatic component with incomplete but  wax  wane sxs  with slow  interval improvement\par less cough wheeze  chest  congestion\par  sputum cleared\par still pending lung  resection at [place]\par \par Abnormal CT chest left upper lobe opacity suspicious for a lung neoplasm but PET CT scan nondiagnostic technically limited\par Hypothyroidism on Synthroid 25 mg 5 days/week and Saturday Sunday 50 mg daily\par \par Pulmonary consultation\par 73-year-old with a chief complaint of chronic significant chest congestion associated with difficulty breathing\par He states he has wheeze that is associated with shortness of breath\par Sputum that is light green in color\par No reported hemoptysis\par No fevers chills or sweats\par No lower extremity edema\par No chest pain pleuritic chest pain exertional chest pain former smoker with a approximately 31-year tobacco history\par 's he states he had a 15-year timeframe where he was not smoking cigarettes\par He informs me he started smoking the age of 27 completed tobacco discontinuation approximate 1 month ago with a 15-year in between low and a total of 31-year pack history\par Treated at [facility] with Ventolin doxycycline and finished  5-day course of prednisone\par He still has significant symptomatology as noted above\par Occupation \par He states he had pneumonia approximately 3 years ago\par Reports childhood bronchitis\par \par \par

## 2020-04-07 LAB
ASPERGILLUS FLAVUS PRECIPITINS: NEGATIVE
ASPERGILLUS FUMIGATES PRECIPTINS: NEGATIVE
ASPERGILLUS NIGER PRECIPITINS: NEGATIVE

## 2020-04-24 ENCOUNTER — APPOINTMENT (OUTPATIENT)
Dept: PULMONOLOGY | Facility: CLINIC | Age: 75
End: 2020-04-24
Payer: MEDICARE

## 2020-04-24 VITALS
TEMPERATURE: 98.3 F | HEART RATE: 69 BPM | DIASTOLIC BLOOD PRESSURE: 88 MMHG | SYSTOLIC BLOOD PRESSURE: 130 MMHG | OXYGEN SATURATION: 95 % | RESPIRATION RATE: 15 BRPM

## 2020-04-24 DIAGNOSIS — R91.8 OTHER NONSPECIFIC ABNORMAL FINDING OF LUNG FIELD: ICD-10-CM

## 2020-04-24 PROCEDURE — 99214 OFFICE O/P EST MOD 30 MIN: CPT | Mod: 25

## 2020-04-24 PROCEDURE — 96372 THER/PROPH/DIAG INJ SC/IM: CPT

## 2020-04-24 RX ORDER — BENRALIZUMAB 30 MG/ML
30 INJECTION, SOLUTION SUBCUTANEOUS
Refills: 0 | Status: COMPLETED | OUTPATIENT
Start: 2020-04-24

## 2020-04-24 RX ADMIN — BENRALIZUMAB 30 MG/ML: 30 INJECTION, SOLUTION SUBCUTANEOUS at 00:00

## 2020-04-24 NOTE — REASON FOR VISIT
[Asthma] : asthma [Follow-Up] : a follow-up visit [COPD] : COPD [Wheezing] : wheezing [TextBox_44] : and 1st dose of Fasenra

## 2020-04-24 NOTE — PHYSICAL EXAM
[Normal Appearance] : normal appearance [General Appearance - Well Developed] : well developed [Well Groomed] : well groomed [General Appearance - In No Acute Distress] : no acute distress [General Appearance - Well Nourished] : well nourished [No Deformities] : no deformities [Normal Conjunctiva] : the conjunctiva exhibited no abnormalities [Eyelids - No Xanthelasma] : the eyelids demonstrated no xanthelasmas [Neck Appearance] : the appearance of the neck was normal [Normal Oropharynx] : normal oropharynx [I] : I [Neck Cervical Mass (___cm)] : no neck mass was observed [Jugular Venous Distention Increased] : there was no jugular-venous distention [Thyroid Diffuse Enlargement] : the thyroid was not enlarged [Heart Rate And Rhythm] : heart rate and rhythm were normal [Heart Sounds] : normal S1 and S2 [Murmurs] : no murmurs present [Arterial Pulses Normal] : the arterial pulses were normal [Veins - Varicosity Changes] : no varicosital changes were noted in the lower extremities [Edema] : no peripheral edema present [Exaggerated Use Of Accessory Muscles For Inspiration] : no accessory muscle use [Lungs Percussion] : the lungs were normal to percussion [Respiration, Rhythm And Depth] : normal respiratory rhythm and effort [Chest Palpation] : palpation of the chest revealed no abnormalities [Bowel Sounds] : normal bowel sounds [Abdomen Tenderness] : non-tender [Abdomen Soft] : soft [Abnormal Walk] : normal gait [Abdomen Mass (___ Cm)] : no abdominal mass palpated [Gait - Sufficient For Exercise Testing] : the gait was sufficient for exercise testing [Cyanosis, Localized] : no localized cyanosis [Nail Clubbing] : no clubbing of the fingernails [] : no ischemic changes [Sensation] : the sensory exam was normal to light touch and pinprick [Deep Tendon Reflexes (DTR)] : deep tendon reflexes were 2+ and symmetric [Petechial Hemorrhages (___cm)] : no petechial hemorrhages [No Focal Deficits] : no focal deficits [Oriented To Time, Place, And Person] : oriented to person, place, and time [Impaired Insight] : insight and judgment were intact [Affect] : the affect was normal [FreeTextEntry1] : Bilateral prolonged expiratory phase with diffuse wheeze rhonchi with interval  improvement and no wheeze just prolonged expiratory phase

## 2020-04-24 NOTE — DISCUSSION/SUMMARY
[FreeTextEntry1] :  post COPD flare treated with steroids but incomplete  recovery\par  COPD- Emphysema\par Vitamin D insufficiency\par HCV positive with RNA NEGATIVE no indication for Consult tx\par Vitamin D supplementation over-the-counter 2000 units daily\par Hypothyroidism on  synthroid\par Symbicort and Spiriva\par Provided with samples Spiriva  2 puff twice daily for lama agent because not using Incruse\par reorder steroids- completed steroids\par  Symbicort 160- 4.5 mcg   2  puffs  BID\par prn ventolin ALEXANDRO\par \par [place] [facility]- Dr Healy -cardiothoracic surgical timing has been on hold and will address this a repeat CT CHEST before any additional intervention NEXT MONTH\par \par  issue of biologic agents asthma panel profile CBC IgE level eosinophils reviewed\par 2  nd dose Fansara  administered\par After third dose may change to every other month\par \par We will need to investigate timing at [place] [facility] and even a follow-up CAT scan of the chest

## 2020-04-24 NOTE — HISTORY OF PRESENT ILLNESS
[Former] : is a former smoker [TextBox_4] : Overall not feeling any better. Coughing, wheezing, SOB. Using Symbicort daily and Ventolin prn.  [FreeTextEntry1] : COPD with an asthmatic component with incomplete but  wax  wane sxs  with slow  interval improvement\par less cough wheeze  chest  congestion\par  sputum cleared\par still pending lung  resection at [place]\par \par Abnormal CT chest left upper lobe opacity suspicious for a lung neoplasm but PET CT scan nondiagnostic technically limited\par Hypothyroidism on Synthroid 25 mg 5 days/week and Saturday Sunday 50 mg daily\par \par Pulmonary consultation\par 73-year-old with a chief complaint of chronic significant chest congestion associated with difficulty breathing\par He states he has wheeze that is associated with shortness of breath\par Sputum that is light green in color\par No reported hemoptysis\par No fevers chills or sweats\par No lower extremity edema\par No chest pain pleuritic chest pain exertional chest pain former smoker with a approximately 31-year tobacco history\par 's he states he had a 15-year timeframe where he was not smoking cigarettes\par He informs me he started smoking the age of 27 completed tobacco discontinuation approximate 1 month ago with a 15-year in between low and a total of 31-year pack history\par [facility] at [facility] with Ventolin doxycycline and finished  5-day course of prednisone\par He still has significant symptomatology as noted above\par Occupation \par He states he had pneumonia approximately 3 years ago\par Reports childhood bronchitis\par \par \par

## 2020-04-24 NOTE — REVIEW OF SYSTEMS
[As Noted in HPI] : as noted in HPI [Reflux] : reflux [Nocturia] : nocturia [Fracture] : fracture [Back Pain] : ~T back pain [Negative] : Sleep Disorder [Fever] : no fever [Trauma] : no ~T physical trauma [Kyphoscoliosis] : no kyphoscoliosis [Myalgias] : no myalgias [Arthralgias] : no arthralgias [Raynaud] : no Raynaud's phenomenon was observed [Scleroderma] : no scleroderma [Rash] : no [unfilled] rash [Itch] : no itching [Ulcerations] : no ulcerations [Telangiectasias] : no telangiectasias [de-identified] : Prior history of herpes zoster, shingles [FreeTextEntry7] : History of viral hepatitis B\par

## 2020-05-20 ENCOUNTER — OUTPATIENT (OUTPATIENT)
Dept: OUTPATIENT SERVICES | Facility: HOSPITAL | Age: 75
LOS: 1 days | End: 2020-05-20
Payer: MEDICARE

## 2020-05-20 ENCOUNTER — APPOINTMENT (OUTPATIENT)
Dept: CT IMAGING | Facility: HOSPITAL | Age: 75
End: 2020-05-20
Payer: MEDICARE

## 2020-05-20 DIAGNOSIS — R91.8 OTHER NONSPECIFIC ABNORMAL FINDING OF LUNG FIELD: ICD-10-CM

## 2020-05-20 PROCEDURE — 71250 CT THORAX DX C-: CPT

## 2020-05-20 PROCEDURE — 71250 CT THORAX DX C-: CPT | Mod: 26

## 2020-05-21 DIAGNOSIS — R91.8 OTHER NONSPECIFIC ABNORMAL FINDING OF LUNG FIELD: ICD-10-CM

## 2020-05-29 ENCOUNTER — APPOINTMENT (OUTPATIENT)
Dept: PULMONOLOGY | Facility: CLINIC | Age: 75
End: 2020-05-29

## 2020-06-02 ENCOUNTER — OUTPATIENT (OUTPATIENT)
Dept: OUTPATIENT SERVICES | Facility: HOSPITAL | Age: 75
LOS: 1 days | End: 2020-06-02
Payer: MEDICARE

## 2020-06-02 ENCOUNTER — APPOINTMENT (OUTPATIENT)
Dept: NUCLEAR MEDICINE | Facility: CLINIC | Age: 75
End: 2020-06-02
Payer: MEDICARE

## 2020-06-02 DIAGNOSIS — Z00.8 ENCOUNTER FOR OTHER GENERAL EXAMINATION: ICD-10-CM

## 2020-06-02 PROCEDURE — A9552: CPT

## 2020-06-02 PROCEDURE — 78815 PET IMAGE W/CT SKULL-THIGH: CPT | Mod: 26,PI

## 2020-06-02 PROCEDURE — 78815 PET IMAGE W/CT SKULL-THIGH: CPT

## 2020-06-20 ENCOUNTER — APPOINTMENT (OUTPATIENT)
Dept: MRI IMAGING | Facility: CLINIC | Age: 75
End: 2020-06-20

## 2020-06-30 ENCOUNTER — RECORD ABSTRACTING (OUTPATIENT)
Age: 75
End: 2020-06-30

## 2020-07-01 ENCOUNTER — APPOINTMENT (OUTPATIENT)
Dept: MRI IMAGING | Facility: CLINIC | Age: 75
End: 2020-07-01
Payer: MEDICARE

## 2020-07-01 ENCOUNTER — OUTPATIENT (OUTPATIENT)
Dept: OUTPATIENT SERVICES | Facility: HOSPITAL | Age: 75
LOS: 1 days | End: 2020-07-01
Payer: MEDICARE

## 2020-07-01 DIAGNOSIS — Z00.8 ENCOUNTER FOR OTHER GENERAL EXAMINATION: ICD-10-CM

## 2020-07-01 PROCEDURE — 70553 MRI BRAIN STEM W/O & W/DYE: CPT

## 2020-07-01 PROCEDURE — 70553 MRI BRAIN STEM W/O & W/DYE: CPT | Mod: 26

## 2020-07-01 PROCEDURE — A9585: CPT

## 2020-07-07 DIAGNOSIS — K13.79 OTHER LESIONS OF ORAL MUCOSA: ICD-10-CM

## 2020-07-07 RX ORDER — DIPHENHYDRAMINE HYDROCHLORIDE AND LIDOCAINE HYDROCHLORIDE AND ALUMINUM HYDROXIDE AND MAGNESIUM HYDRO
KIT
Qty: 1 | Refills: 0 | Status: ACTIVE | COMMUNITY
Start: 2020-07-07 | End: 1900-01-01

## 2020-10-26 ENCOUNTER — APPOINTMENT (OUTPATIENT)
Dept: PULMONOLOGY | Facility: CLINIC | Age: 75
End: 2020-10-26
Payer: MEDICARE

## 2020-11-04 ENCOUNTER — APPOINTMENT (OUTPATIENT)
Dept: PULMONOLOGY | Facility: CLINIC | Age: 75
End: 2020-11-04
Payer: MEDICARE

## 2020-11-04 VITALS
TEMPERATURE: 97.6 F | DIASTOLIC BLOOD PRESSURE: 78 MMHG | OXYGEN SATURATION: 97 % | RESPIRATION RATE: 16 BRPM | HEART RATE: 84 BPM | SYSTOLIC BLOOD PRESSURE: 134 MMHG

## 2020-11-04 DIAGNOSIS — Z20.828 CONTACT WITH AND (SUSPECTED) EXPOSURE TO OTHER VIRAL COMMUNICABLE DISEASES: ICD-10-CM

## 2020-11-04 PROCEDURE — 90732 PPSV23 VACC 2 YRS+ SUBQ/IM: CPT

## 2020-11-04 PROCEDURE — 99214 OFFICE O/P EST MOD 30 MIN: CPT | Mod: 25

## 2020-11-04 PROCEDURE — G0009: CPT

## 2020-11-04 PROCEDURE — 99072 ADDL SUPL MATRL&STAF TM PHE: CPT

## 2020-11-04 RX ORDER — AMLODIPINE BESYLATE 10 MG/1
10 TABLET ORAL DAILY
Qty: 90 | Refills: 2 | Status: ACTIVE | COMMUNITY
Start: 2019-10-08 | End: 1900-01-01

## 2020-11-04 RX ORDER — PREDNISONE 10 MG/1
10 TABLET ORAL
Qty: 30 | Refills: 1 | Status: DISCONTINUED | COMMUNITY
Start: 2019-12-13 | End: 2020-11-04

## 2020-11-04 RX ORDER — CLINDAMYCIN HYDROCHLORIDE 300 MG/1
300 CAPSULE ORAL
Qty: 21 | Refills: 0 | Status: DISCONTINUED | COMMUNITY
Start: 2020-07-06 | End: 2020-11-04

## 2020-11-04 NOTE — PROCEDURE
[FreeTextEntry1] : Data reviewed CT chest with IV contrast August 11, 2020\par Interval resolution of the left suprahilar and anterior mediastinal lymphadenopathy\par Pretracheal lymph node 10 mm\par Subcentimeter lymph nodes AP window and paratracheal\par No supraclavicular adenopathy\par \par Severe pulmonary emphysema\par \par Spiculated nodule in 3 left upper lobe 1.9 x 1.3 cm decreased in size since the prior study went where it measured 2.3 x 1.8\par Pleural thickening\par Atelectasis left base\par Mild reticular markings with traction bronchiectasis right lung base rule out early signs of mild interstitial fibrosis\par No dominant pulmonary nodules right lung\par Mild diffuse peribronchial thickening\par Overall impression\par Interval with marked decrease in the extent of lymphadenopathy\par Residual lymphadenopathy noted\par Decrease in the size of the left upper lobe mass lesion\par Also reported a filling defect noted in the proximal left superior pulmonary vein reported 10 mm concern whether this is thrombus versus extension of the mass into the left knee.  Pulmonary vein\par CT abdomen pelvis\par We will decrease in the size of a metastatic segment 6 liver lesion measuring 1.3 cm\par Near complete resolution of the metastatic adenopathy in the teresa hepatis and precaval lymph node stations\par Prior demonstrated left adrenal mets no longer visualized\par No new evidence of new metastatic disease in the abdomen or pelvis\par Laboratory data\par Comprehensive metabolic profile unremarkable\par CBC hematocrit 33 but otherwise normal\par \par \par PFT March 13, 2020 moderate obstructive ventilatory impairment\par No bronchodilator response at FEV1\par Air trapping with RV/TLC ratio 156% of predicted.\par Diffusion 79% of predicted.\par Stable flow rates but noted significant interval improvement dating back to December 20, 2019\par \par FENO 57  ppb chest with bronchial inflammation\par \par Chest x-ray PA lateral normal cardiac size Franki  3 2020\par Dirty lungs consistent with known COPD\par Fullness left hilum\par No clear infiltrate noted\par \par X-ray PA lateral December 20, 2019\par Follow-up\par Normal cardiac size\par Mild calcification aortic knob\par COPD changes\par Some suggestion of mild interval clearing of the left lower lobe but persistent abnormality noted\par \par \par Spirometry  December 13, 2019\par Severe obstructive ventilatory impairment\par 12% response to bronchodilator at the FEV1\par Significant decline in flow rates compared to earlier data\par Bronchodilator provided albuterol via nebulizer inhalational treatment\par \par PFT Nov 13 2019\par Moderate obstructive ventilatory impairment\par No  response to bronchodilator at FEV1\par Normal lung volumes\par  Mild Moderate reduction diffusion 63% of predicted with a loss of functioning alveolocapillary units\par Compared to April 4, 2019 there is interval improvement of flow rates diffusion and stable total lung capacity\par \par EKG 4/18/2019\par NSR \par r/o old anterior wall MI \par Noted no change compared EKG 2016\par \par Blood Draw\par Increase Synthroid to 50 mcg Saturday and Sunday and continue 25 mc data review April 19, 2019\par CBC White blood count 5.15 hemoglobin 12.5 hematocrit 39.4 .8\par Platelet count 256,000\par Hemoglobin A1c 5.2 with mean plasma glucose 103\par Vitamin D 25.3 data review April 19, 2019\par CBC\par White blood count just above normal 12.76 hemoglobin 13.9 hematocrit 41.1 platelet count 291,000\par Hemoglobin A1c 5.7% with mean plasma glucose 117\par HCV weakly reactive  and pending HCV RNA qualitative test - NOTED HCV RNA is Negative\par Lipid profile\par Cholesterol 146 HDL 31 triglycerides 99 LDL 95\par PSA 0.58\par T4 free T4 TSH normal\par TSH 0.60\par Vitamin D 19.4 \par Serum electrolytes are normal\par Renal function normal\par BUN 12 creatinine 0.83\par AST 33\par ALT 48\par Alkaline phosphatase bilirubin normal \par \par  Prevnar IM   9/18/19\par High-dose flu vaccination administered November 4 2020

## 2020-11-04 NOTE — REVIEW OF SYSTEMS
[As Noted in HPI] : as noted in HPI [Reflux] : reflux [Nocturia] : nocturia [Fracture] : fracture [Back Pain] : ~T back pain [Negative] : Sleep Disorder [Fever] : no fever [Trauma] : no ~T physical trauma [Kyphoscoliosis] : no kyphoscoliosis [Myalgias] : no myalgias [Arthralgias] : no arthralgias [Raynaud] : no Raynaud's phenomenon was observed [Scleroderma] : no scleroderma [Rash] : no [unfilled] rash [Itch] : no itching [Ulcerations] : no ulcerations [Telangiectasias] : no telangiectasias [FreeTextEntry7] : History of viral hepatitis B\par  [de-identified] : Prior history of herpes zoster, shingles

## 2020-11-04 NOTE — DISCUSSION/SUMMARY
[FreeTextEntry1] :  metastatic small cell lung carcinoma-completed chemotherapy\par Pending to start immunotherapy biologic treatment protocol as per [place] [facility]\par  COPD- Emphysema\par Vitamin D insufficiency\par HCV positive with RNA NEGATIVE no indication for Consult tx\par Vitamin D supplementation over-the-counter 2000 units daily\par Hypothyroidism on  synthroid\par Symbicort and Spiriva\par Provided with samples Spiriva  2 puff twice daily for lama agent because not using Incruse\par reorder steroids- completed steroids\par  Symbicort 160- 4.5 mcg   2  puffs  BID\par prn ventolin ALEXANDRO\par CT chest abdomen pelvis pending this Friday, November 6, 2020\par

## 2020-11-04 NOTE — HISTORY OF PRESENT ILLNESS
[Former] : is a former smoker [TextBox_4] : Overall not feeling any better. Coughing, wheezing, SOB. Using Symbicort daily and Ventolin prn.  [FreeTextEntry1] : Small cell metastatic lung cancer\par August scans demonstrated interval improvement\par \par COPD with an asthmatic component with incomplete but  wax  wane sxs  with slow  interval improvement\par less cough wheeze  chest  congestion\par  sputum cleared\par still pending lung  resection at [place]\par \par Hypothyroidism on Synthroid 25 mg 5 days/week and Saturday Sunday 50 mg daily\par \par Pulmonary consultation\par 73-year-old with a chief complaint of chronic significant chest congestion associated with difficulty breathing\par He states he has wheeze that is associated with shortness of breath\par Sputum that is light green in color\par No reported hemoptysis\par No fevers chills or sweats\par No lower extremity edema\par No chest pain pleuritic chest pain exertional chest pain former smoker with a approximately 31-year tobacco history\par 's he states he had a 15-year timeframe where he was not smoking cigarettes\par He informs me he started smoking the age of 27 completed tobacco discontinuation approximate 1 month ago with a 15-year in between low and a total of 31-year pack history\par Treated at [facility] with Ventolin doxycycline and finished  5-day course of prednisone\par He still has significant symptomatology as noted above\par Occupation \par He states he had pneumonia approximately 3 years ago\par Reports childhood bronchitis\par \par \par

## 2020-11-22 ENCOUNTER — TRANSCRIPTION ENCOUNTER (OUTPATIENT)
Age: 75
End: 2020-11-22

## 2020-11-24 ENCOUNTER — NON-APPOINTMENT (OUTPATIENT)
Age: 75
End: 2020-11-24

## 2020-11-24 DIAGNOSIS — J45.909 UNSPECIFIED ASTHMA, UNCOMPLICATED: ICD-10-CM

## 2020-11-27 LAB — SARS-COV-2 N GENE NPH QL NAA+PROBE: NOT DETECTED

## 2020-11-30 ENCOUNTER — APPOINTMENT (OUTPATIENT)
Dept: PULMONOLOGY | Facility: CLINIC | Age: 75
End: 2020-11-30
Payer: MEDICARE

## 2020-11-30 VITALS
RESPIRATION RATE: 16 BRPM | SYSTOLIC BLOOD PRESSURE: 127 MMHG | WEIGHT: 185 LBS | HEIGHT: 71 IN | BODY MASS INDEX: 25.9 KG/M2 | OXYGEN SATURATION: 94 % | TEMPERATURE: 98 F | DIASTOLIC BLOOD PRESSURE: 74 MMHG | HEART RATE: 82 BPM

## 2020-11-30 LAB — POCT - HEMOGLOBIN (HGB), QUANTITATIVE, TRANSCUTANEOUS: 13.4

## 2020-11-30 PROCEDURE — 95012 NITRIC OXIDE EXP GAS DETER: CPT

## 2020-11-30 PROCEDURE — 94750: CPT

## 2020-11-30 PROCEDURE — ZZZZZ: CPT

## 2020-11-30 PROCEDURE — 99072 ADDL SUPL MATRL&STAF TM PHE: CPT

## 2020-11-30 PROCEDURE — 99214 OFFICE O/P EST MOD 30 MIN: CPT | Mod: 25

## 2020-11-30 PROCEDURE — 94060 EVALUATION OF WHEEZING: CPT

## 2020-11-30 PROCEDURE — 94727 GAS DIL/WSHOT DETER LNG VOL: CPT

## 2020-11-30 NOTE — REASON FOR VISIT
[Asthma] : asthma [Follow-Up] : a follow-up visit [COPD] : COPD [Wheezing] : wheezing [Lung Cancer] : lung cancer

## 2020-11-30 NOTE — PROCEDURE
[FreeTextEntry1] : NIOX  74  ppb  consistent with bronchial inflammation\par PFT November 30, 2020\par Moderate obstructive ventilatory impairment\par No response to bronchodilator at FEV1\par Lung volumes are normal\par Total lung capacity 95% predicted.\par No significant air trapping with RV/TLC ratio 124% predicted.\par Severe reduction diffusion 48% predicted with a loss of functioning alveolar capillary units\par Hemoglobin 13.4\par \par CT scan completed through [facility] of lung cancer with restaging\par Scan completed on October 20, 2020\par Severe panlobular emphysema\par Spiculated left upper lobe nodule continues to elongate and decrease in size now measuring 2.0 x 1.1 cm\par Pleural thickening left costophrenic angle\par Atelectasis bases\par Mild unchanged reticulation at the lung bases compatible with developing fibrosis\par Mild peribronchial thickening\par Tracheobronchial essentially patent\par Mediastinal hilar lymph nodes stable borderline enlarged mediastinal lymph node\par Right paratracheal lymph node 1.1 cm\par Coronary artery calcification\par Impression\par Decreased size spiculated left upper lobe nodule\par Stable borderline enlarged mediastinal adenopathy that has decreased in size compared to the prior several studies\par Stable filling defect within the proximal left superior pulmonary vein\par \par Data reviewed CT chest with IV contrast August 11, 2020\par Interval resolution of the left suprahilar and anterior mediastinal lymphadenopathy\par Pretracheal lymph node 10 mm\par Subcentimeter lymph nodes AP window and paratracheal\par No supraclavicular adenopathy\par \par Severe pulmonary emphysema\par \par Spiculated nodule in 3 left upper lobe 1.9 x 1.3 cm decreased in size since the prior study went where it measured 2.3 x 1.8\par Pleural thickening\par Atelectasis left base\par Mild reticular markings with traction bronchiectasis right lung base rule out early signs of mild interstitial fibrosis\par No dominant pulmonary nodules right lung\par Mild diffuse peribronchial thickening\par Overall impression\par Interval with marked decrease in the extent of lymphadenopathy\par Residual lymphadenopathy noted\par Decrease in the size of the left upper lobe mass lesion\par Also reported a filling defect noted in the proximal left superior pulmonary vein reported 10 mm concern whether this is thrombus versus extension of the mass into the left knee.  Pulmonary vein\par CT abdomen pelvis\par We will decrease in the size of a metastatic segment 6 liver lesion measuring 1.3 cm\par Near complete resolution of the metastatic adenopathy in the teresa hepatis and precaval lymph node stations\par Prior demonstrated left adrenal mets no longer visualized\par No new evidence of new metastatic disease in the abdomen or pelvis\par Laboratory data\par Comprehensive metabolic profile unremarkable\par CBC hematocrit 33 but otherwise normal\par \par \par PFT March 13, 2020 moderate obstructive ventilatory impairment\par No bronchodilator response at FEV1\par Air trapping with RV/TLC ratio 156% of predicted.\par Diffusion 79% of predicted.\par Stable flow rates but noted significant interval improvement dating back to December 20, 2019\par \par FENO 57  ppb chest with bronchial inflammation\par \par Chest x-ray PA lateral normal cardiac size Franki  3 2020\par Dirty lungs consistent with known COPD\par Fullness left hilum\par No clear infiltrate noted\par \par X-ray PA lateral December 20, 2019\par Follow-up\par Normal cardiac size\par Mild calcification aortic knob\par COPD changes\par Some suggestion of mild interval clearing of the left lower lobe but persistent abnormality noted\par \par \par Spirometry  December 13, 2019\par Severe obstructive ventilatory impairment\par 12% response to bronchodilator at the FEV1\par Significant decline in flow rates compared to earlier data\par Bronchodilator provided albuterol via nebulizer inhalational treatment\par \par PFT Nov 13 2019\par Moderate obstructive ventilatory impairment\par No  response to bronchodilator at FEV1\par Normal lung volumes\par  Mild Moderate reduction diffusion 63% of predicted with a loss of functioning alveolocapillary units\par Compared to April 4, 2019 there is interval improvement of flow rates diffusion and stable total lung capacity\par \par EKG 4/18/2019\par NSR \par r/o old anterior wall MI \par Noted no change compared EKG 2016\par \par Blood Draw\par Increase Synthroid to 50 mcg Saturday and Sunday and continue 25 mc data review April 19, 2019\par CBC White blood count 5.15 hemoglobin 12.5 hematocrit 39.4 .8\par Platelet count 256,000\par Hemoglobin A1c 5.2 with mean plasma glucose 103\par Vitamin D 25.3 data review April 19, 2019\par CBC\par White blood count just above normal 12.76 hemoglobin 13.9 hematocrit 41.1 platelet count 291,000\par Hemoglobin A1c 5.7% with mean plasma glucose 117\par HCV weakly reactive  and pending HCV RNA qualitative test - NOTED HCV RNA is Negative\par Lipid profile\par Cholesterol 146 HDL 31 triglycerides 99 LDL 95\par PSA 0.58\par T4 free T4 TSH normal\par TSH 0.60\par Vitamin D 19.4 \par Serum electrolytes are normal\par Renal function normal\par BUN 12 creatinine 0.83\par AST 33\par ALT 48\par Alkaline phosphatase bilirubin normal \par \par  Prevnar IM   9/18/19\par High-dose flu vaccination administered November 4 2020

## 2020-11-30 NOTE — HISTORY OF PRESENT ILLNESS
[Former] : former [TextBox_4] : Bronchitic symptoms Nov 24 2020\par There is a natural history to this as patient has this type of symptomatology on a yearly basis with underlying COPD\par Also under management with lung cancer via [place]\par Antibiotic protocol\par Doxycycline 100 mg 1 tablet p.o. twice daily x7 days\par Prednisone 40 mg with acute 3-day taper \par . Coughing, wheezing, SOB. Using Symbicort daily and Ventolin prn.  [FreeTextEntry1] : Small cell metastatic lung cancer\par August scans demonstrated interval improvement\par \par COPD with an asthmatic component with incomplete but  wax  wane sxs  with slow  interval improvement\par less cough wheeze  chest  congestion\par  sputum cleared\par still pending lung  resection at [place]\par \par Hypothyroidism on Synthroid 25 mg 5 days/week and Saturday Sunday 50 mg daily\par \par Pulmonary consultation\par 73-year-old with a chief complaint of chronic significant chest congestion associated with difficulty breathing\par He states he has wheeze that is associated with shortness of breath\par Sputum that is light green in color\par No reported hemoptysis\par No fevers chills or sweats\par No lower extremity edema\par No chest pain pleuritic chest pain exertional chest pain former smoker with a approximately 31-year tobacco history\par 's he states he had a 15-year timeframe where he was not smoking cigarettes\par He informs me he started smoking the age of 27 completed tobacco discontinuation approximate 1 month ago with a 15-year in between low and a total of 31-year pack history\par Treated at [facility] with Ventolin doxycycline and finished  5-day course of prednisone\par He still has significant symptomatology as noted above\par Occupation \par He states he had pneumonia approximately 3 years ago\par Reports childhood bronchitis\par \par \par

## 2020-11-30 NOTE — DISCUSSION/SUMMARY
[FreeTextEntry1] :  metastatic small cell lung carcinoma-completed chemotherapy\par Pending to start immunotherapy biologic treatment protocol as per [place] [facility]\par  COPD- Emphysema\par post COPD flare  but not back on controller therapy -Restart\par Vitamin D insufficiency\par HCV positive with RNA NEGATIVE no indication for Consult tx\par Vitamin D supplementation over-the-counter 2000 units daily\par Hypothyroidism on  synthroid\par Symbicort and Spiriva- SAMPLE BREZTRI 2 puffs BID and Rinse\par reorder steroids- completed steroids with retx \par prn ventolin ALEXANDRO\par \par

## 2020-11-30 NOTE — REVIEW OF SYSTEMS
[As Noted in HPI] : as noted in HPI [Reflux] : reflux [Nocturia] : nocturia [Fracture] : fracture [Back Pain] : ~T back pain [Negative] : Sleep Disorder [Fever] : no fever [Trauma] : no ~T physical trauma [Kyphoscoliosis] : no kyphoscoliosis [Myalgias] : no myalgias [Arthralgias] : no arthralgias [Raynaud] : no Raynaud's phenomenon was observed [Scleroderma] : no scleroderma [Rash] : no [unfilled] rash [Itch] : no itching [Ulcerations] : no ulcerations [Telangiectasias] : no telangiectasias [FreeTextEntry7] : History of viral hepatitis B\par  [de-identified] : Prior history of herpes zoster, shingles

## 2020-12-14 ENCOUNTER — APPOINTMENT (OUTPATIENT)
Dept: PULMONOLOGY | Facility: CLINIC | Age: 75
End: 2020-12-14
Payer: MEDICARE

## 2020-12-14 VITALS
HEIGHT: 71 IN | HEART RATE: 79 BPM | TEMPERATURE: 98.4 F | SYSTOLIC BLOOD PRESSURE: 175 MMHG | OXYGEN SATURATION: 94 % | WEIGHT: 185 LBS | DIASTOLIC BLOOD PRESSURE: 86 MMHG | BODY MASS INDEX: 25.9 KG/M2

## 2020-12-14 PROCEDURE — 71046 X-RAY EXAM CHEST 2 VIEWS: CPT

## 2020-12-14 PROCEDURE — 99072 ADDL SUPL MATRL&STAF TM PHE: CPT

## 2020-12-14 PROCEDURE — 99214 OFFICE O/P EST MOD 30 MIN: CPT | Mod: 25

## 2020-12-14 NOTE — REASON FOR VISIT
[Lung Cancer] : lung cancer [Asthma] : asthma [Follow-Up] : a follow-up visit [COPD] : COPD [Wheezing] : wheezing

## 2020-12-14 NOTE — DISCUSSION/SUMMARY
[FreeTextEntry1] :  metastatic small cell lung carcinoma-completed chemotherapy\par Pending to start immunotherapy biologic treatment protocol as per [place] [facility]\par  COPD- Emphysema\par post COPD flare  but not back on controller therapy -Restart\par Vitamin D insufficiency\par HCV positive with RNA NEGATIVE no indication for Consult tx\par Vitamin D supplementation over-the-counter 2000 units daily\par Hypothyroidism on  synthroid\par Symbicort and Spiriva- SAMPLE BREZTRI 2 puffs BID and Rinse\par reorder steroids- completed steroids with retx with long term\par prn ventolin ALEXANDRO\par \par

## 2020-12-14 NOTE — PROCEDURE
[FreeTextEntry1] : Chest x-ray PA lateral December 14, 2020\par Cardiac size is normal\par Scarring bilateral lower lung zones predominantly on\par Evidence of hyper aeration\par No parenchymal infiltrates pleural effusions dominant pulmonary nodules appreciated on\par Lamination right hemidiaphragm\par Mild calcification aortic knob\par Impression no evidence of pneumonia\par \par NIOX  74  ppb  consistent with bronchial inflammation\par PFT November 30, 2020\par Moderate obstructive ventilatory impairment\par No response to bronchodilator at FEV1\par Lung volumes are normal\par Total lung capacity 95% predicted.\par No significant air trapping with RV/TLC ratio 124% predicted.\par Severe reduction diffusion 48% predicted with a loss of functioning alveolar capillary units\par Hemoglobin 13.4\par \par CT scan completed through [facility] of lung cancer with restaging\par Scan completed on October 20, 2020\par Severe panlobular emphysema\par Spiculated left upper lobe nodule continues to elongate and decrease in size now measuring 2.0 x 1.1 cm\par Pleural thickening left costophrenic angle\par Atelectasis bases\par Mild unchanged reticulation at the lung bases compatible with developing fibrosis\par Mild peribronchial thickening\par Tracheobronchial essentially patent\par Mediastinal hilar lymph nodes stable borderline enlarged mediastinal lymph node\par Right paratracheal lymph node 1.1 cm\par Coronary artery calcification\par Impression\par Decreased size spiculated left upper lobe nodule\par Stable borderline enlarged mediastinal adenopathy that has decreased in size compared to the prior several studies\par Stable filling defect within the proximal left superior pulmonary vein\par \par Data reviewed CT chest with IV contrast August 11, 2020\par Interval resolution of the left suprahilar and anterior mediastinal lymphadenopathy\par Pretracheal lymph node 10 mm\par Subcentimeter lymph nodes AP window and paratracheal\par No supraclavicular adenopathy\par \par Severe pulmonary emphysema\par \par Spiculated nodule in 3 left upper lobe 1.9 x 1.3 cm decreased in size since the prior study went where it measured 2.3 x 1.8\par Pleural thickening\par Atelectasis left base\par Mild reticular markings with traction bronchiectasis right lung base rule out early signs of mild interstitial fibrosis\par No dominant pulmonary nodules right lung\par Mild diffuse peribronchial thickening\par Overall impression\par Interval with marked decrease in the extent of lymphadenopathy\par Residual lymphadenopathy noted\par Decrease in the size of the left upper lobe mass lesion\par Also reported a filling defect noted in the proximal left superior pulmonary vein reported 10 mm concern whether this is thrombus versus extension of the mass into the left knee.  Pulmonary vein\par CT abdomen pelvis\par We will decrease in the size of a metastatic segment 6 liver lesion measuring 1.3 cm\par Near complete resolution of the metastatic adenopathy in the teresa hepatis and precaval lymph node stations\par Prior demonstrated left adrenal mets no longer visualized\par No new evidence of new metastatic disease in the abdomen or pelvis\par Laboratory data\par Comprehensive metabolic profile unremarkable\par CBC hematocrit 33 but otherwise normal\par \par \par PFT March 13, 2020 moderate obstructive ventilatory impairment\par No bronchodilator response at FEV1\par Air trapping with RV/TLC ratio 156% of predicted.\par Diffusion 79% of predicted.\par Stable flow rates but noted significant interval improvement dating back to December 20, 2019\par \par FENO 57  ppb chest with bronchial inflammation\par \par Chest x-ray PA lateral normal cardiac size Franki  3 2020\par Dirty lungs consistent with known COPD\par Fullness left hilum\par No clear infiltrate noted\par \par X-ray PA lateral December 20, 2019\par Follow-up\par Normal cardiac size\par Mild calcification aortic knob\par COPD changes\par Some suggestion of mild interval clearing of the left lower lobe but persistent abnormality noted\par \par \par Spirometry  December 13, 2019\par Severe obstructive ventilatory impairment\par 12% response to bronchodilator at the FEV1\par Significant decline in flow rates compared to earlier data\par Bronchodilator provided albuterol via nebulizer inhalational treatment\par \par PFT Nov 13 2019\par Moderate obstructive ventilatory impairment\par No  response to bronchodilator at FEV1\par Normal lung volumes\par  Mild Moderate reduction diffusion 63% of predicted with a loss of functioning alveolocapillary units\par Compared to April 4, 2019 there is interval improvement of flow rates diffusion and stable total lung capacity\par \par EKG 4/18/2019\par NSR \par r/o old anterior wall MI \par Noted no change compared EKG 2016\par \par Blood Draw\par Increase Synthroid to 50 mcg Saturday and Sunday and continue 25 mc data review April 19, 2019\par CBC White blood count 5.15 hemoglobin 12.5 hematocrit 39.4 .8\par Platelet count 256,000\par Hemoglobin A1c 5.2 with mean plasma glucose 103\par Vitamin D 25.3 data review April 19, 2019\par CBC\par White blood count just above normal 12.76 hemoglobin 13.9 hematocrit 41.1 platelet count 291,000\par Hemoglobin A1c 5.7% with mean plasma glucose 117\par HCV weakly reactive  and pending HCV RNA qualitative test - NOTED HCV RNA is Negative\par Lipid profile\par Cholesterol 146 HDL 31 triglycerides 99 LDL 95\par PSA 0.58\par T4 free T4 TSH normal\par TSH 0.60\par Vitamin D 19.4 \par Serum electrolytes are normal\par Renal function normal\par BUN 12 creatinine 0.83\par AST 33\par ALT 48\par Alkaline phosphatase bilirubin normal \par \par  Prevnar IM   9/18/19\par High-dose flu vaccination administered November 4 2020

## 2020-12-14 NOTE — REVIEW OF SYSTEMS
[As Noted in HPI] : as noted in HPI [Reflux] : reflux [Nocturia] : nocturia [Fracture] : fracture [Back Pain] : ~T back pain [Negative] : Sleep Disorder [Fever] : no fever [Trauma] : no ~T physical trauma [Kyphoscoliosis] : no kyphoscoliosis [Myalgias] : no myalgias [Arthralgias] : no arthralgias [Raynaud] : no Raynaud's phenomenon was observed [Scleroderma] : no scleroderma [Rash] : no [unfilled] rash [Itch] : no itching [Ulcerations] : no ulcerations [Telangiectasias] : no telangiectasias [FreeTextEntry7] : History of viral hepatitis B\par  [de-identified] : Prior history of herpes zoster, shingles

## 2020-12-14 NOTE — HISTORY OF PRESENT ILLNESS
[Former] : is a former smoker [TextBox_4] : Bronchitic symptoms Nov 24 2020\par There is a natural history to this as patient has this type of symptomatology on a yearly basis with underlying COPD\par Also under management with lung cancer via [place]\par Antibiotic protocol\par Doxycycline 100 mg 1 tablet p.o. twice daily x7 days\par Prednisone 40 mg with acute 3-day taper \par . Coughing, wheezing, SOB. Using Symbicort daily and Ventolin prn.  [FreeTextEntry1] : Small cell metastatic lung cancer\par August scans demonstrated interval improvement\par \par COPD with an asthmatic component with incomplete but  wax  wane sxs  with slow  interval improvement\par less cough wheeze  chest  congestion\par  sputum cleared\par still pending lung  resection at [place]\par \par Hypothyroidism on Synthroid 25 mg 5 days/week and Saturday Sunday 50 mg daily\par \par Pulmonary consultation\par 73-year-old with a chief complaint of chronic significant chest congestion associated with difficulty breathing\par He states he has wheeze that is associated with shortness of breath\par Sputum that is light green in color\par No reported hemoptysis\par No fevers chills or sweats\par No lower extremity edema\par No chest pain pleuritic chest pain exertional chest pain former smoker with a approximately 31-year tobacco history\par 's he states he had a 15-year timeframe where he was not smoking cigarettes\par He informs me he started smoking the age of 27 completed tobacco discontinuation approximate 1 month ago with a 15-year in between low and a total of 31-year pack history\par Treated at [facility] with Ventolin doxycycline and finished  5-day course of prednisone\par He still has significant symptomatology as noted above\par Occupation \par He states he had pneumonia approximately 3 years ago\par Reports childhood bronchitis\par \par \par

## 2020-12-29 ENCOUNTER — APPOINTMENT (OUTPATIENT)
Dept: PULMONOLOGY | Facility: CLINIC | Age: 75
End: 2020-12-29
Payer: MEDICARE

## 2020-12-29 VITALS
OXYGEN SATURATION: 95 % | HEART RATE: 73 BPM | SYSTOLIC BLOOD PRESSURE: 154 MMHG | TEMPERATURE: 97.9 F | RESPIRATION RATE: 16 BRPM | DIASTOLIC BLOOD PRESSURE: 81 MMHG

## 2020-12-29 DIAGNOSIS — J44.9 CHRONIC OBSTRUCTIVE PULMONARY DISEASE, UNSPECIFIED: ICD-10-CM

## 2020-12-29 PROCEDURE — 99072 ADDL SUPL MATRL&STAF TM PHE: CPT

## 2020-12-29 PROCEDURE — 99214 OFFICE O/P EST MOD 30 MIN: CPT | Mod: 25

## 2020-12-29 PROCEDURE — 36415 COLL VENOUS BLD VENIPUNCTURE: CPT

## 2020-12-30 LAB
ALBUMIN SERPL ELPH-MCNC: 4.6 G/DL
ALP BLD-CCNC: 50 U/L
ALT SERPL-CCNC: 31 U/L
ANION GAP SERPL CALC-SCNC: 15 MMOL/L
AST SERPL-CCNC: 27 U/L
BASOPHILS # BLD AUTO: 0.03 K/UL
BASOPHILS NFR BLD AUTO: 0.4 %
BILIRUB SERPL-MCNC: 0.4 MG/DL
BUN SERPL-MCNC: 21 MG/DL
CALCIUM SERPL-MCNC: 9.2 MG/DL
CHLORIDE SERPL-SCNC: 99 MMOL/L
CO2 SERPL-SCNC: 26 MMOL/L
CREAT SERPL-MCNC: 0.95 MG/DL
EOSINOPHIL # BLD AUTO: 0.19 K/UL
EOSINOPHIL NFR BLD AUTO: 2.5 %
GLUCOSE SERPL-MCNC: 71 MG/DL
HCT VFR BLD CALC: 44.3 %
HGB BLD-MCNC: 14.6 G/DL
IMM GRANULOCYTES NFR BLD AUTO: 0.4 %
LYMPHOCYTES # BLD AUTO: 1.38 K/UL
LYMPHOCYTES NFR BLD AUTO: 18.4 %
MAN DIFF?: NORMAL
MCHC RBC-ENTMCNC: 32.3 PG
MCHC RBC-ENTMCNC: 33 GM/DL
MCV RBC AUTO: 98 FL
MONOCYTES # BLD AUTO: 0.54 K/UL
MONOCYTES NFR BLD AUTO: 7.2 %
NEUTROPHILS # BLD AUTO: 5.31 K/UL
NEUTROPHILS NFR BLD AUTO: 71.1 %
NT-PROBNP SERPL-MCNC: 73 PG/ML
PLATELET # BLD AUTO: 158 K/UL
POTASSIUM SERPL-SCNC: 4 MMOL/L
PROT SERPL-MCNC: 6.9 G/DL
RBC # BLD: 4.52 M/UL
RBC # FLD: 12.9 %
SODIUM SERPL-SCNC: 141 MMOL/L
TOTAL IGE SMQN RAST: 23 KU/L
WBC # FLD AUTO: 7.48 K/UL

## 2020-12-30 NOTE — HISTORY OF PRESENT ILLNESS
[TextBox_4] : Complaints of shortness of breath on little exertion\par Finished course of doxycline last week\par Stated he stopped prednisone \par Still significantly SOB

## 2020-12-30 NOTE — ASSESSMENT
[FreeTextEntry1] : asthma/COPD overlap\par elevated NIOX in past\par steroid dependenet\par did not respond to fasenra in past but would seen reasonable to retry biologic-check labs.\par

## 2020-12-30 NOTE — PHYSICAL EXAM
[No Acute Distress] : no acute distress [Normal Oropharynx] : normal oropharynx [Normal Appearance] : normal appearance [No Neck Mass] : no neck mass [Normal Rate/Rhythm] : normal rate/rhythm [Normal S1, S2] : normal s1, s2 [No Murmurs] : no murmurs [Wheeze] : wheeze [No Abnormalities] : no abnormalities [Benign] : benign [Normal Gait] : normal gait [No Clubbing] : no clubbing [No Cyanosis] : no cyanosis [No Edema] : no edema [FROM] : FROM [Normal Color/ Pigmentation] : normal color/ pigmentation [No Focal Deficits] : no focal deficits [Oriented x3] : oriented x3 [Normal Affect] : normal affect

## 2020-12-31 ENCOUNTER — NON-APPOINTMENT (OUTPATIENT)
Age: 75
End: 2020-12-31

## 2021-01-04 ENCOUNTER — NON-APPOINTMENT (OUTPATIENT)
Age: 76
End: 2021-01-04

## 2021-01-05 ENCOUNTER — NON-APPOINTMENT (OUTPATIENT)
Age: 76
End: 2021-01-05

## 2021-01-06 ENCOUNTER — NON-APPOINTMENT (OUTPATIENT)
Age: 76
End: 2021-01-06

## 2021-01-06 ENCOUNTER — INPATIENT (INPATIENT)
Facility: HOSPITAL | Age: 76
LOS: 2 days | Discharge: HOME | DRG: 871 | End: 2021-01-09
Attending: HOSPITALIST | Admitting: INTERNAL MEDICINE
Payer: MEDICARE

## 2021-01-06 ENCOUNTER — EMERGENCY (EMERGENCY)
Facility: HOSPITAL | Age: 76
LOS: 1 days | Discharge: HOME | End: 2021-01-06
Attending: EMERGENCY MEDICINE | Admitting: EMERGENCY MEDICINE
Payer: MEDICARE

## 2021-01-06 VITALS
TEMPERATURE: 98 F | SYSTOLIC BLOOD PRESSURE: 139 MMHG | DIASTOLIC BLOOD PRESSURE: 84 MMHG | HEART RATE: 82 BPM | WEIGHT: 184.97 LBS | OXYGEN SATURATION: 95 % | HEIGHT: 70 IN | RESPIRATION RATE: 17 BRPM

## 2021-01-06 VITALS — HEIGHT: 70 IN | WEIGHT: 184.97 LBS

## 2021-01-06 LAB
ALBUMIN SERPL ELPH-MCNC: 3.3 G/DL — SIGNIFICANT CHANGE UP (ref 3.3–5)
ALP SERPL-CCNC: 59 U/L — SIGNIFICANT CHANGE UP (ref 40–120)
ALT FLD-CCNC: 51 U/L — HIGH (ref 10–45)
ANION GAP SERPL CALC-SCNC: 12 MMOL/L — SIGNIFICANT CHANGE UP (ref 5–17)
APTT BLD: 25.6 SEC — LOW (ref 27.5–35.5)
AST SERPL-CCNC: 36 U/L — SIGNIFICANT CHANGE UP (ref 10–40)
BASOPHILS # BLD AUTO: 0.03 K/UL — SIGNIFICANT CHANGE UP (ref 0–0.2)
BASOPHILS NFR BLD AUTO: 0.2 % — SIGNIFICANT CHANGE UP (ref 0–2)
BILIRUB SERPL-MCNC: 0.4 MG/DL — SIGNIFICANT CHANGE UP (ref 0.2–1.2)
BUN SERPL-MCNC: 24 MG/DL — HIGH (ref 7–23)
CALCIUM SERPL-MCNC: 8.7 MG/DL — SIGNIFICANT CHANGE UP (ref 8.4–10.5)
CHLORIDE SERPL-SCNC: 98 MMOL/L — SIGNIFICANT CHANGE UP (ref 96–108)
CO2 SERPL-SCNC: 26 MMOL/L — SIGNIFICANT CHANGE UP (ref 22–31)
CREAT SERPL-MCNC: 1.05 MG/DL — SIGNIFICANT CHANGE UP (ref 0.5–1.3)
EOSINOPHIL # BLD AUTO: 0.02 K/UL — SIGNIFICANT CHANGE UP (ref 0–0.5)
EOSINOPHIL NFR BLD AUTO: 0.1 % — SIGNIFICANT CHANGE UP (ref 0–6)
GLUCOSE SERPL-MCNC: 117 MG/DL — HIGH (ref 70–99)
HCT VFR BLD CALC: 37.9 % — LOW (ref 39–50)
HGB BLD-MCNC: 13.2 G/DL — SIGNIFICANT CHANGE UP (ref 13–17)
IMM GRANULOCYTES NFR BLD AUTO: 0.5 % — SIGNIFICANT CHANGE UP (ref 0–1.5)
INR BLD: 1.39 RATIO — HIGH (ref 0.88–1.16)
LYMPHOCYTES # BLD AUTO: 1.3 K/UL — SIGNIFICANT CHANGE UP (ref 1–3.3)
LYMPHOCYTES # BLD AUTO: 8.2 % — LOW (ref 13–44)
MCHC RBC-ENTMCNC: 31.6 PG — SIGNIFICANT CHANGE UP (ref 27–34)
MCHC RBC-ENTMCNC: 34.8 GM/DL — SIGNIFICANT CHANGE UP (ref 32–36)
MCV RBC AUTO: 90.7 FL — SIGNIFICANT CHANGE UP (ref 80–100)
MONOCYTES # BLD AUTO: 1.2 K/UL — HIGH (ref 0–0.9)
MONOCYTES NFR BLD AUTO: 7.6 % — SIGNIFICANT CHANGE UP (ref 2–14)
NEUTROPHILS # BLD AUTO: 13.15 K/UL — HIGH (ref 1.8–7.4)
NEUTROPHILS NFR BLD AUTO: 83.4 % — HIGH (ref 43–77)
NRBC # BLD: 0 /100 WBCS — SIGNIFICANT CHANGE UP (ref 0–0)
PLATELET # BLD AUTO: 176 K/UL — SIGNIFICANT CHANGE UP (ref 150–400)
POTASSIUM SERPL-MCNC: 3.5 MMOL/L — SIGNIFICANT CHANGE UP (ref 3.5–5.3)
POTASSIUM SERPL-SCNC: 3.5 MMOL/L — SIGNIFICANT CHANGE UP (ref 3.5–5.3)
PROT SERPL-MCNC: 7.1 G/DL — SIGNIFICANT CHANGE UP (ref 6–8.3)
PROTHROM AB SERPL-ACNC: 16.6 SEC — HIGH (ref 10.6–13.6)
RBC # BLD: 4.18 M/UL — LOW (ref 4.2–5.8)
RBC # FLD: 12 % — SIGNIFICANT CHANGE UP (ref 10.3–14.5)
SODIUM SERPL-SCNC: 136 MMOL/L — SIGNIFICANT CHANGE UP (ref 135–145)
WBC # BLD: 15.78 K/UL — HIGH (ref 3.8–10.5)
WBC # FLD AUTO: 15.78 K/UL — HIGH (ref 3.8–10.5)

## 2021-01-06 PROCEDURE — 99283 EMERGENCY DEPT VISIT LOW MDM: CPT

## 2021-01-06 PROCEDURE — 71045 X-RAY EXAM CHEST 1 VIEW: CPT | Mod: 26

## 2021-01-06 PROCEDURE — 99285 EMERGENCY DEPT VISIT HI MDM: CPT

## 2021-01-06 PROCEDURE — 93010 ELECTROCARDIOGRAM REPORT: CPT

## 2021-01-06 PROCEDURE — 99284 EMERGENCY DEPT VISIT MOD MDM: CPT | Mod: 25

## 2021-01-06 RX ORDER — ACETAMINOPHEN 500 MG
650 TABLET ORAL ONCE
Refills: 0 | Status: COMPLETED | OUTPATIENT
Start: 2021-01-06 | End: 2021-01-06

## 2021-01-06 RX ORDER — SODIUM CHLORIDE 9 MG/ML
2600 INJECTION INTRAMUSCULAR; INTRAVENOUS; SUBCUTANEOUS ONCE
Refills: 0 | Status: COMPLETED | OUTPATIENT
Start: 2021-01-06 | End: 2021-01-06

## 2021-01-06 RX ADMIN — SODIUM CHLORIDE 2600 MILLILITER(S): 9 INJECTION INTRAMUSCULAR; INTRAVENOUS; SUBCUTANEOUS at 23:21

## 2021-01-06 RX ADMIN — Medication 650 MILLIGRAM(S): at 23:09

## 2021-01-06 NOTE — ED PROVIDER NOTE - OBJECTIVE STATEMENT
74 y/o M h/o small cell lung ca states 2 episodes of hemoptysis today. States he had a coughing fit last night and really "vigorously coughed" to ge the mucous up. Denies fever, chills, chest pain, abdominal pain, nausea, vomiting, diarrhea, weakness. Appears well, speaking in full sentences. Closely follows Pulmonologist Dr. Zavala very closely- recent CTA chest performed 1/4/20- negative PE.  (+) Former smoker.

## 2021-01-06 NOTE — ED ADULT NURSE NOTE - CHIEF COMPLAINT
To nursing--order entered and printed. Ok to call pt's  and tell him referral done. Thanks.      1. Hearing loss, unspecified hearing loss type, unspecified laterality    - OP REFERRAL TO AUDIOLOGY The patient is a 75y Male complaining of cough.

## 2021-01-06 NOTE — ED ADULT NURSE NOTE - OBJECTIVE STATEMENT
complains of hemoptysis with cough, 2 episodes noted. recent diagnosis of fluid on his lungs from CT on antibiotics. Skin warm dry color pale pink.

## 2021-01-06 NOTE — ED PROVIDER NOTE - PATIENT PORTAL LINK FT
You can access the FollowMyHealth Patient Portal offered by [facility] by registering at the following website: [email or web address]. By joining [employer]’s FollowMyHealth portal, you will also be able to view your health information using other applications (apps) compatible with our system.

## 2021-01-06 NOTE — ED ADULT NURSE NOTE - PMH
Hypertension, unspecified type    Small cell carcinoma of lung     Hypertension, unspecified type    Multifocal pneumonia    Small cell carcinoma of lung

## 2021-01-06 NOTE — ED PROVIDER NOTE - CLINICAL SUMMARY MEDICAL DECISION MAKING FREE TEXT BOX
pt is elderly male with h/o lung CA , second visit in ED in one day for similar symptoms, now febrile, sepsi work up was done and started on IV ABX, pt is elderly male with h/o lung CA , second visit in ED in one day for similar symptoms, now febrile, sepsis work up was done and started on IV ABX, CXr like left lung mass,   probably worsening lung CA causing hemoptysis,  pt needs oncology evaluation

## 2021-01-06 NOTE — ED PROVIDER NOTE - PHYSICAL EXAMINATION
General:     NAD, well-nourished, well-appearing  Head:     NC/AT, EOMI, oral mucosa moist  Neck:     supple  Lungs:     B/L fine rhonchii  CVS:     S1S2, RRR, no m/g/r  Abd:     +BS, s/nt/nd, no organomegaly  Ext:    2+ radial and pedal pulses, no c/c/e  Neuro: grossly intact

## 2021-01-06 NOTE — ED PROVIDER NOTE - OBJECTIVE STATEMENT
76 y/o M h/o small cell lung ca states 2 episodes of hemoptysis today. States he had a coughing fit last night and really "vigorously coughed" to ge the mucous up. Denies fever, chills, chest pain, abdominal pain, nausea, vomiting, diarrhea, weakness. Appears well, speaking in full sentences. Closely follows Pulmonologist Dr. Zavala very closely- recent CTA chest performed 1/4/20- negative PE.  (+) Former smoker.  Pt was seen in ED earlier and was discharged, but pt state after going home he had more episodes of hemoptysis and was more SOB, now also has fever

## 2021-01-06 NOTE — ED ADULT TRIAGE NOTE - CHIEF COMPLAINT QUOTE
difficulty breathing/ seen in ER earlier today  hx of lung CA difficulty breathing with increase in hemoptysis/ seen in ER earlier today  hx of lung CA

## 2021-01-06 NOTE — ED PROVIDER NOTE - CLINICAL SUMMARY MEDICAL DECISION MAKING FREE TEXT BOX
74 y/o M h/o small cell lung ca states 2 episodes of hemoptysis today. States he had a coughing fit last night and really "vigorously coughed" to ge the mucous up. Denies fever, chills, chest pain, abdominal pain, nausea, vomiting, diarrhea, weakness. Appears well, speaking in full sentences. Closely follows Pulmonologist Dr. Zavala very closely- recent CTA chest performed 1/4/20- negative PE.  (+) Former smoker. O2 sat stable. No need for further intervention. Continue follow up with Pulm. Strict ED return precautions discussed. Patient understands and agrees.

## 2021-01-06 NOTE — ED ADULT NURSE NOTE - OBJECTIVE STATEMENT
Patient with history of lung CA BIBA from home with c/o difficulty breathing and increase in hemoptysis.  Patient seen and evaluated in ER earlier today with similar symptoms.  Patient states he uses 2LNC at home with no improvement.  Per EMS, patient was sating in the 80s on RA PTA but arrives speaking in clear sentences with no respiratory distress. Patient with history of lung CA BIBA from home with c/o difficulty breathing and increase in hemoptysis.  Patient seen and evaluated in ER earlier today with similar symptoms.  Patient states he uses 2LNC at home with no improvement.  Per EMS, patient was sating in the 80s on RA PTA but arrives speaking in clear sentences with no respiratory distress.  Patient arrives febrile orally. Patient with history of lung CA and HTN BIBA from home with c/o difficulty breathing and increase in hemoptysis.  Patient seen and evaluated in ER earlier today with similar symptoms.  Patient states he uses 2LNC at home with no improvement.  Per EMS, patient was sating in the 80s on RA PTA but arrives speaking in clear sentences with no respiratory distress.  Patient arrives febrile orally.

## 2021-01-06 NOTE — ED PROVIDER NOTE - ATTENDING CONTRIBUTION TO CARE
Kelin with DAVID Shields. 76 y/o M h/o small cell lung ca states 2 episodes of hemoptysis today. States he had a coughing fit last night and really "vigorously coughed" to ge the mucous up. Denies fever, chills, chest pain, abdominal pain, nausea, vomiting, diarrhea, weakness. Appears well, speaking in full sentences. Closely follows Pulmonologist Dr. Zavala very closely- recent CTA chest performed 1/4/20- negative PE.  (+) Former smoker. O2 sat stable. No need for further intervention. Continue follow up with Pulm. Strict ED return precautions discussed. Patient understands and agrees.  I performed a face to face bedside interview with patient regarding history of present illness, review of symptoms and past medical history. I completed an independent physical exam.  I have discussed the patient's plan of care with Physician Assistant (PA). I agree with note as stated above, having amended the EMR as needed to reflect my findings.   This includes History of Present Illness, HIV, Past Medical/Surgical/Family/Social History, Allergies and Home Medications, Review of Systems, Physical Exam, and any Progress Notes during the time I functioned as the attending physician for this patient.

## 2021-01-06 NOTE — ED PROVIDER NOTE - NSFOLLOWUPINSTRUCTIONS_ED_ALL_ED_FT
Follow up with your Pulmonologist within 1-2 days.   Rest, increase your fluids.   Take all of your medications as previously prescribed by your Pulmonologist.   Worsening, continued or ANY new concerning symptoms return to the Emergency Department.       Hemoptysis    WHAT YOU NEED TO KNOW:    Hemoptysis is coughing up blood. This occurs when blood vessels in your airway or lungs weaken or break, and begin to bleed. You may bleed in small or large amounts that appear in your sputum (spit).     DISCHARGE INSTRUCTIONS:    Return to the emergency department if:   •You have new or worsening chest pain or shortness of breath.      •Your bleeding gets worse or you cough up a large amount of blood.      •You cannot stop vomiting.      •You are so dizzy that you think you may fall or faint.      •You have pain or swelling in your legs.      •Your legs and arms feel cold or look pale.      Contact your healthcare provider if:   •You have new or increasing shortness of breath.      •You have a fever.      •You lose weight without trying.      •You feel more weak and tired than usual.      •You have a cough that does not improve or gets worse.       •You have questions or concerns about your condition or care.      Medicines: You may need any of the following:   •Antibiotics may be given to fight or prevent an infection caused by bacteria. Always take your antibiotics exactly as ordered by your healthcare provider. Do not stop taking your medicine unless directed by your healthcare provider.       •Antitussives help control or stop your cough.       •Take your medicine as directed. Contact your healthcare provider if you think your medicine is not helping or if you have side effects. Tell him or her if you are allergic to any medicine. Keep a list of the medicines, vitamins, and herbs you take. Include the amounts, and when and why you take them. Bring the list or the pill bottles to follow-up visits. Carry your medicine list with you in case of an emergency.      Follow up with your healthcare provider in 2 days or as directed: You may need frequent visits to monitor your condition and prevent further blood loss. Write down your questions so you remember to ask them during your visits.    Use caution with medicines: Certain medicines, such as NSAIDs, increase your risk for bleeding. Herbal supplements also increase your risk. Examples of herbal supplements are garlic, gingko, and ginseng. Ask your healthcare provider before you take any over-the-counter medicines.     Do not smoke, and do not go to smoky areas: Smoke may worsen your hemoptysis. Nicotine and other chemicals in cigarettes and cigars can also cause lung damage. Ask your healthcare provider for information if you currently smoke and need help to quit. E-cigarettes or smokeless tobacco still contain nicotine. Talk to your healthcare provider before you use these products.

## 2021-01-07 ENCOUNTER — NON-APPOINTMENT (OUTPATIENT)
Age: 76
End: 2021-01-07

## 2021-01-07 DIAGNOSIS — J18.9 PNEUMONIA, UNSPECIFIED ORGANISM: ICD-10-CM

## 2021-01-07 LAB
ALBUMIN SERPL ELPH-MCNC: 2.9 G/DL — LOW (ref 3.3–5)
ALP SERPL-CCNC: 62 U/L — SIGNIFICANT CHANGE UP (ref 40–120)
ALT FLD-CCNC: 48 U/L — HIGH (ref 10–45)
ANION GAP SERPL CALC-SCNC: 10 MMOL/L — SIGNIFICANT CHANGE UP (ref 5–17)
APPEARANCE UR: CLEAR — SIGNIFICANT CHANGE UP
AST SERPL-CCNC: 40 U/L — SIGNIFICANT CHANGE UP (ref 10–40)
B PERT DNA SPEC QL NAA+PROBE: SIGNIFICANT CHANGE UP
BACTERIA # UR AUTO: NEGATIVE /HPF — SIGNIFICANT CHANGE UP
BASOPHILS # BLD AUTO: 0.05 K/UL — SIGNIFICANT CHANGE UP (ref 0–0.2)
BASOPHILS NFR BLD AUTO: 0.3 % — SIGNIFICANT CHANGE UP (ref 0–2)
BILIRUB SERPL-MCNC: 0.4 MG/DL — SIGNIFICANT CHANGE UP (ref 0.2–1.2)
BILIRUB UR-MCNC: NEGATIVE — SIGNIFICANT CHANGE UP
BLD GP AB SCN SERPL QL: SIGNIFICANT CHANGE UP
BUN SERPL-MCNC: 17 MG/DL — SIGNIFICANT CHANGE UP (ref 7–23)
C PNEUM DNA SPEC QL NAA+PROBE: SIGNIFICANT CHANGE UP
CALCIUM SERPL-MCNC: 8.7 MG/DL — SIGNIFICANT CHANGE UP (ref 8.4–10.5)
CHLORIDE SERPL-SCNC: 99 MMOL/L — SIGNIFICANT CHANGE UP (ref 96–108)
CO2 SERPL-SCNC: 27 MMOL/L — SIGNIFICANT CHANGE UP (ref 22–31)
COLOR SPEC: YELLOW — SIGNIFICANT CHANGE UP
CREAT SERPL-MCNC: 0.89 MG/DL — SIGNIFICANT CHANGE UP (ref 0.5–1.3)
DIFF PNL FLD: ABNORMAL
EOSINOPHIL # BLD AUTO: 0.03 K/UL — SIGNIFICANT CHANGE UP (ref 0–0.5)
EOSINOPHIL NFR BLD AUTO: 0.2 % — SIGNIFICANT CHANGE UP (ref 0–6)
EPI CELLS # UR: SIGNIFICANT CHANGE UP
FLUAV H1 2009 PAND RNA SPEC QL NAA+PROBE: SIGNIFICANT CHANGE UP
FLUAV H1 RNA SPEC QL NAA+PROBE: SIGNIFICANT CHANGE UP
FLUAV H3 RNA SPEC QL NAA+PROBE: SIGNIFICANT CHANGE UP
FLUAV SUBTYP SPEC NAA+PROBE: SIGNIFICANT CHANGE UP
FLUBV RNA SPEC QL NAA+PROBE: SIGNIFICANT CHANGE UP
GLUCOSE SERPL-MCNC: 82 MG/DL — SIGNIFICANT CHANGE UP (ref 70–99)
GLUCOSE UR QL: NEGATIVE — SIGNIFICANT CHANGE UP
HADV DNA SPEC QL NAA+PROBE: SIGNIFICANT CHANGE UP
HCOV PNL SPEC NAA+PROBE: SIGNIFICANT CHANGE UP
HCT VFR BLD CALC: 37.6 % — LOW (ref 39–50)
HCV AB S/CO SERPL IA: 1.05 S/CO — HIGH (ref 0–0.99)
HCV AB SERPL-IMP: ABNORMAL
HGB BLD-MCNC: 12.6 G/DL — LOW (ref 13–17)
HMPV RNA SPEC QL NAA+PROBE: SIGNIFICANT CHANGE UP
HPIV1 RNA SPEC QL NAA+PROBE: SIGNIFICANT CHANGE UP
HPIV2 RNA SPEC QL NAA+PROBE: SIGNIFICANT CHANGE UP
HPIV3 RNA SPEC QL NAA+PROBE: SIGNIFICANT CHANGE UP
HPIV4 RNA SPEC QL NAA+PROBE: SIGNIFICANT CHANGE UP
IMM GRANULOCYTES NFR BLD AUTO: 0.9 % — SIGNIFICANT CHANGE UP (ref 0–1.5)
KETONES UR-MCNC: NEGATIVE — SIGNIFICANT CHANGE UP
LACTATE SERPL-SCNC: 0.8 MMOL/L — SIGNIFICANT CHANGE UP (ref 0.7–2)
LACTATE SERPL-SCNC: 2.2 MMOL/L — HIGH (ref 0.7–2)
LEUKOCYTE ESTERASE UR-ACNC: NEGATIVE — SIGNIFICANT CHANGE UP
LYMPHOCYTES # BLD AUTO: 1.39 K/UL — SIGNIFICANT CHANGE UP (ref 1–3.3)
LYMPHOCYTES # BLD AUTO: 8.6 % — LOW (ref 13–44)
MCHC RBC-ENTMCNC: 30.5 PG — SIGNIFICANT CHANGE UP (ref 27–34)
MCHC RBC-ENTMCNC: 33.5 GM/DL — SIGNIFICANT CHANGE UP (ref 32–36)
MCV RBC AUTO: 91 FL — SIGNIFICANT CHANGE UP (ref 80–100)
MONOCYTES # BLD AUTO: 1.23 K/UL — HIGH (ref 0–0.9)
MONOCYTES NFR BLD AUTO: 7.6 % — SIGNIFICANT CHANGE UP (ref 2–14)
NEUTROPHILS # BLD AUTO: 13.35 K/UL — HIGH (ref 1.8–7.4)
NEUTROPHILS NFR BLD AUTO: 82.4 % — HIGH (ref 43–77)
NITRITE UR-MCNC: NEGATIVE — SIGNIFICANT CHANGE UP
NRBC # BLD: 0 /100 WBCS — SIGNIFICANT CHANGE UP (ref 0–0)
PH UR: 6 — SIGNIFICANT CHANGE UP (ref 5–8)
PLATELET # BLD AUTO: 173 K/UL — SIGNIFICANT CHANGE UP (ref 150–400)
POTASSIUM SERPL-MCNC: 4 MMOL/L — SIGNIFICANT CHANGE UP (ref 3.5–5.3)
POTASSIUM SERPL-SCNC: 4 MMOL/L — SIGNIFICANT CHANGE UP (ref 3.5–5.3)
PROT SERPL-MCNC: 6.6 G/DL — SIGNIFICANT CHANGE UP (ref 6–8.3)
PROT UR-MCNC: NEGATIVE — SIGNIFICANT CHANGE UP
RAPID RVP RESULT: SIGNIFICANT CHANGE UP
RBC # BLD: 4.13 M/UL — LOW (ref 4.2–5.8)
RBC # FLD: 12.2 % — SIGNIFICANT CHANGE UP (ref 10.3–14.5)
RBC CASTS # UR COMP ASSIST: ABNORMAL /HPF (ref 0–4)
RSV RNA SPEC QL NAA+PROBE: SIGNIFICANT CHANGE UP
RV+EV RNA SPEC QL NAA+PROBE: SIGNIFICANT CHANGE UP
SARS-COV-2 RNA SPEC QL NAA+PROBE: SIGNIFICANT CHANGE UP
SARS-COV-2 RNA SPEC QL NAA+PROBE: SIGNIFICANT CHANGE UP
SODIUM SERPL-SCNC: 136 MMOL/L — SIGNIFICANT CHANGE UP (ref 135–145)
SP GR SPEC: 1.01 — SIGNIFICANT CHANGE UP (ref 1.01–1.02)
UROBILINOGEN FLD QL: NEGATIVE — SIGNIFICANT CHANGE UP
WBC # BLD: 16.2 K/UL — HIGH (ref 3.8–10.5)
WBC # FLD AUTO: 16.2 K/UL — HIGH (ref 3.8–10.5)
WBC UR QL: NEGATIVE /HPF — SIGNIFICANT CHANGE UP (ref 0–5)

## 2021-01-07 PROCEDURE — G1004: CPT

## 2021-01-07 PROCEDURE — 71270 CT THORAX DX C-/C+: CPT | Mod: 26,MG

## 2021-01-07 PROCEDURE — 99221 1ST HOSP IP/OBS SF/LOW 40: CPT | Mod: AI

## 2021-01-07 RX ORDER — ACETAMINOPHEN 500 MG
650 TABLET ORAL EVERY 6 HOURS
Refills: 0 | Status: DISCONTINUED | OUTPATIENT
Start: 2021-01-07 | End: 2021-01-09

## 2021-01-07 RX ORDER — METHADONE HYDROCHLORIDE 40 MG/1
2.5 TABLET ORAL
Qty: 0 | Refills: 0 | DISCHARGE

## 2021-01-07 RX ORDER — AZITHROMYCIN 500 MG/1
TABLET, FILM COATED ORAL
Refills: 0 | Status: DISCONTINUED | OUTPATIENT
Start: 2021-01-07 | End: 2021-01-09

## 2021-01-07 RX ORDER — AZITHROMYCIN 500 MG/1
500 TABLET, FILM COATED ORAL EVERY 24 HOURS
Refills: 0 | Status: DISCONTINUED | OUTPATIENT
Start: 2021-01-08 | End: 2021-01-09

## 2021-01-07 RX ORDER — BUDESONIDE AND FORMOTEROL FUMARATE DIHYDRATE 160; 4.5 UG/1; UG/1
2 AEROSOL RESPIRATORY (INHALATION)
Refills: 0 | Status: DISCONTINUED | OUTPATIENT
Start: 2021-01-07 | End: 2021-01-09

## 2021-01-07 RX ORDER — AMLODIPINE BESYLATE 2.5 MG/1
10 TABLET ORAL DAILY
Refills: 0 | Status: DISCONTINUED | OUTPATIENT
Start: 2021-01-07 | End: 2021-01-09

## 2021-01-07 RX ORDER — AZITHROMYCIN 500 MG/1
500 TABLET, FILM COATED ORAL ONCE
Refills: 0 | Status: COMPLETED | OUTPATIENT
Start: 2021-01-07 | End: 2021-01-07

## 2021-01-07 RX ORDER — METHADONE HYDROCHLORIDE 40 MG/1
3 TABLET ORAL
Qty: 0 | Refills: 0 | DISCHARGE

## 2021-01-07 RX ORDER — CEFTRIAXONE 500 MG/1
1000 INJECTION, POWDER, FOR SOLUTION INTRAMUSCULAR; INTRAVENOUS EVERY 24 HOURS
Refills: 0 | Status: DISCONTINUED | OUTPATIENT
Start: 2021-01-07 | End: 2021-01-09

## 2021-01-07 RX ORDER — PANTOPRAZOLE SODIUM 20 MG/1
40 TABLET, DELAYED RELEASE ORAL
Refills: 0 | Status: DISCONTINUED | OUTPATIENT
Start: 2021-01-07 | End: 2021-01-09

## 2021-01-07 RX ORDER — METHADONE HYDROCHLORIDE 40 MG/1
15 TABLET ORAL DAILY
Refills: 0 | Status: DISCONTINUED | OUTPATIENT
Start: 2021-01-07 | End: 2021-01-09

## 2021-01-07 RX ORDER — ALBUTEROL 90 UG/1
2 AEROSOL, METERED ORAL EVERY 6 HOURS
Refills: 0 | Status: DISCONTINUED | OUTPATIENT
Start: 2021-01-07 | End: 2021-01-09

## 2021-01-07 RX ADMIN — CEFTRIAXONE 100 MILLIGRAM(S): 500 INJECTION, POWDER, FOR SOLUTION INTRAMUSCULAR; INTRAVENOUS at 01:19

## 2021-01-07 RX ADMIN — CEFTRIAXONE 1000 MILLIGRAM(S): 500 INJECTION, POWDER, FOR SOLUTION INTRAMUSCULAR; INTRAVENOUS at 01:51

## 2021-01-07 RX ADMIN — AMLODIPINE BESYLATE 10 MILLIGRAM(S): 2.5 TABLET ORAL at 05:54

## 2021-01-07 RX ADMIN — BUDESONIDE AND FORMOTEROL FUMARATE DIHYDRATE 2 PUFF(S): 160; 4.5 AEROSOL RESPIRATORY (INHALATION) at 09:32

## 2021-01-07 RX ADMIN — ALBUTEROL 2 PUFF(S): 90 AEROSOL, METERED ORAL at 09:29

## 2021-01-07 RX ADMIN — ALBUTEROL 2 PUFF(S): 90 AEROSOL, METERED ORAL at 21:58

## 2021-01-07 RX ADMIN — Medication 650 MILLIGRAM(S): at 16:35

## 2021-01-07 RX ADMIN — ALBUTEROL 2 PUFF(S): 90 AEROSOL, METERED ORAL at 05:24

## 2021-01-07 RX ADMIN — METHADONE HYDROCHLORIDE 15 MILLIGRAM(S): 40 TABLET ORAL at 12:13

## 2021-01-07 RX ADMIN — BUDESONIDE AND FORMOTEROL FUMARATE DIHYDRATE 2 PUFF(S): 160; 4.5 AEROSOL RESPIRATORY (INHALATION) at 21:58

## 2021-01-07 RX ADMIN — ALBUTEROL 2 PUFF(S): 90 AEROSOL, METERED ORAL at 16:02

## 2021-01-07 RX ADMIN — PANTOPRAZOLE SODIUM 40 MILLIGRAM(S): 20 TABLET, DELAYED RELEASE ORAL at 05:55

## 2021-01-07 RX ADMIN — SODIUM CHLORIDE 2600 MILLILITER(S): 9 INJECTION INTRAMUSCULAR; INTRAVENOUS; SUBCUTANEOUS at 01:50

## 2021-01-07 RX ADMIN — AZITHROMYCIN 255 MILLIGRAM(S): 500 TABLET, FILM COATED ORAL at 01:56

## 2021-01-07 NOTE — ED ADULT NURSE REASSESSMENT NOTE - NS ED NURSE REASSESS COMMENT FT1
Patient transported to the bathroom via wheelchair by RN.  Patient now back in room, back on bedside CM and IV azithromycin still in progress.  Turkey sandwich and water bottle given to patient.  Patient tolerating PO intake.  Stretcher in lowest position with side rails up and safety maintained.

## 2021-01-07 NOTE — CHART NOTE - NSCHARTNOTEFT_GEN_A_CORE
75M hx of COPD, HTN, on methadone for chronic back pain and compression fx, metastatic small cell lung cancer MIRELLA with mediastinal LAD, liver, adrenal mets s/p chemo last over 3 months ago with improvement in recent CT now on immunotherapy q3 weeks pw cough, SOB and acute hypoxic respiratory failure with hemoptysis and multilobar pna.     *Acute Hypoxic Respiratory Failure:  *Multilobar PNA:  Cont CTX and Azithro-day 2  Monitor O2 sat and downtitrare as needed--on 4 L NC  Pulm consult appreciated  CT chest with multifocal PNA  Fever curve, trend WBC  Obtain RVP    *Leukocytosis:  due to infection  cont abx    *HTN:  Cont norvasc    *Chronic back pain:  Cont methadone    Dispo: home when clinically improved. Patient had home oxygen delivery Wednesday so patient can go home on O2 if needed. Plan to discharge home based on clinical improvement. Case d/w wife Yuliya.

## 2021-01-07 NOTE — ED ADULT NURSE REASSESSMENT NOTE - NS ED NURSE REASSESS COMMENT FT1
Patient is at CT.  IV fluids still bolusing as ordered.  Pending repeat lactate after completion of fluids.

## 2021-01-07 NOTE — CHART NOTE - NSCHARTNOTEFT_GEN_A_CORE
Search Terms: franki anderson, 1945     Search Date: 01/07/2021 00:45:08 AM         The Drug Utilization Report below displays all of the controlled substance prescriptions, if any, that your patient has filled in the last twelve months. The information displayed on this report is compiled from pharmacy submissions to the Department, and accurately reflects the information as submitted by the pharmacies.    This report was requested by: Geni Macias | Reference #: [id number]           You have not added a KENNEDY number. Keeping your KENNEDY number(s) up to date on the My KENNEDY Numbers page will enable the separation of your prescriptions from others in the search results.          Others' Prescriptions        Patient Name: Franki Anderson     YOB: 1945       Address: [address] [place] [address]     Sex: Male                     Rx Written    Rx Dispensed    Drug    Quantity    Days Supply    Prescriber Name    Payment Method    Dispenser      07/09/2020 07/09/2020 oxycodone-acetaminophen 5-325 mg tab  15 3 [facility] Astorga [facility]   06/17/2020 06/18/2020 diazepam 5 mg tablet  6 3 [facility]   05/29/2020 05/30/2020 diazepam 5 mg tablet  6 3 [facility]                 Patient Name: Franki Anderson     YOB: 1945       Address: [address] [place] [address]     Sex: Male                     Rx Written    Rx Dispensed    Drug    Quantity    Days Supply    Prescriber Name    Payment Method    Dispenser      10/20/2020 12/11/2020 methadone hcl 10 mg tablet  120 30 Trimarco, Hazel NP [facility]   09/23/2020 10/19/2020 methadone hcl 10 mg tablet  120 30 Trimarco, Hazel NP [facility]   08/26/2020 09/15/2020 methadone hcl 10 mg tablet  120 30 Trimarco, Hazel NP [facility]   07/24/2020 08/10/2020 methadone hcl 10 mg tablet  120 30 Trimarco, Hazel NP [facility]   05/19/2020 06/22/2020 methadone hcl 10 mg tablet  120 30 Trimarco, Hazel NP [facility]   04/20/2020 05/07/2020 methadone hcl 10 mg tablet  120 30 Trimarco, Hazel NP [facility]   02/14/2020 02/21/2020 methadone hcl 10 mg tablet  120 30 Trimarco, Hazel NP [facility]   01/17/2020 01/31/2020 methadone hcl 10 mg tablet  120 30 Hazel Amaro NP [facility]               * - Drugs marked with an asterisk are compound drugs. If the compound drug is made up of more than one controlled substance, then each controlled substance will be a separate row in the table.

## 2021-01-07 NOTE — H&P ADULT - NSHPPHYSICALEXAM_GEN_ALL_CORE
Vital Signs Last 24 Hrs  T(C): 37.4 (07 Jan 2021 00:14), Max: 38.1 (06 Jan 2021 22:52)  T(F): 99.3 (07 Jan 2021 00:14), Max: 100.6 (06 Jan 2021 22:52)  HR: 80 (07 Jan 2021 00:14) (80 - 90)  BP: 120/62 (07 Jan 2021 00:14) (113/95 - 139/84)  BP(mean): --  RR: 20 (07 Jan 2021 00:14) (17 - 20)  SpO2: 100% (07 Jan 2021 00:14) (95% - 100%)  Daily Height in cm: 177.8 (06 Jan 2021 22:47)    Daily   CAPILLARY BLOOD GLUCOSE        I&O's Summary      GENERAL: NAD, speaking full sentences.   HEAD:  Normocephalic  EYES: EOMI, PERRLA, conjunctiva and sclera clear  ENMT: No tonsillar erythema, exudates, or enlargement; Moist mucous membranes, No lesions  NECK: Supple, No JVD, no bruit, normal thyroid  NERVOUS SYSTEM:  Alert & Oriented X3, Good concentration; grossly  Motor Strength 5/5 B/L upper and lower extremities; DTRs 2+ intact and symmetric  CHEST/LUNG: bilateral crackles at the mid to lower lung fields, occ rhonchi, + exp wheezing, no rubs  HEART: Regular rate and rhythm; No murmurs, rubs, or gallops  ABDOMEN: Soft, Nontender, Nondistended; Bowel sounds present  EXTREMITIES:  2+ Peripheral Pulses, No clubbing, cyanosis, or edema  LYMPH: No lymphadenopathy noted  SKIN: No rashes or lesions

## 2021-01-07 NOTE — ED ADULT NURSE REASSESSMENT NOTE - NS ED NURSE REASSESS COMMENT FT1
Urine specimen sent to the lab as ordered prior to patient receiving IV ceftriaxone and IV azithromycin.

## 2021-01-07 NOTE — H&P ADULT - ASSESSMENT
75M hx of COPD, HTN, on methadone for chronic back pain and compression fx, metastatic small cell lung cancer MIRELLA with mediastinal LAD, liver, adrenal mets s/p chemo last over 3 months ago with improvement in recent CT now on immunotherapy q3 weeks pw cough, SOB and acute hypoxic respiratory failure with hemoptysis and multilobar pna.     #Multilobar pna with severe sepsis (+fever, +wbc, +lact) and hemoptysis and acute hypoxic respiratory failure and COPD exac  failed outpt Levaquin.  not true PCN allergy (had some numbness in R hand after being on amoxicillin for over 30 days)  IV Ceftriaxone and azithromycin. IV steroids  cont alb and symbicort.   cont supplemental oxygen.   hemoptysis likely sec to pna but given hx of lung ca recheck CT of chest.   Pulmonary consult.   COVID iso protocol until ruled out.     #HTN  cont amlodipine as BP tolerates.     #Chronic back pain   cont methadone    #DVT/GI proph  IPC. PPI.

## 2021-01-07 NOTE — H&P ADULT - NSHPREVIEWOFSYSTEMS_GEN_ALL_CORE
CONSTITUTIONAL: +fever, no weight loss, or fatigue  EYES: No eye pain, visual disturbances, or discharge  ENMT:  No difficulty hearing, tinnitus, vertigo; No sinus or throat pain  NECK: No pain or stiffness  RESPIRATORY: + cough, +wheezing, +chills + hemoptysis; +shortness of breath  CARDIOVASCULAR: No chest pain but R sided rib pain if coughing hard, palpitations, dizziness, or leg swelling  GASTROINTESTINAL: No abdominal or epigastric pain. No nausea, vomiting, or hematemesis; No diarrhea or constipation. No melena or hematochezia.  GENITOURINARY: No dysuria, frequency, hematuria, or incontinence  NEUROLOGICAL: No headaches, memory loss, loss of strength, numbness, or tremors  SKIN: No itching, burning, rashes, or lesions   LYMPH NODES: No enlarged glands  ENDOCRINE: No heat or cold intolerance; No hair loss  MUSCULOSKELETAL: No joint pain or swelling; No muscle, back, or extremity pain. chronic back pain with compression fx  PSYCHIATRIC: No depression, anxiety, mood swings, or difficulty sleeping  HEME/LYMPH: No easy bruising, or bleeding gums  ALLERY AND IMMUNOLOGIC: No hives or eczema    IMPROVE VTE Individual Risk Assessment          RISK                                                          Points  [  ] Previous VTE                                                3  [  ] Thrombophilia                                             2  [  ] Lower limb paralysis                                   2        (unable to hold up >15 seconds)    [  2] Current Cancer                                             2         (within 6 months)  [  ] Immobilization > 24 hrs                              1  [  ] ICU/CCU stay > 24 hours                             1  [1  ] Age > 60                                                         1    IMPROVE VTE Score:         [     3    ]    Total Risk Factor Score:    0 - 1:   Consider IPC  >2 - 3:  Thromboprophylaxis required (enoxaparin or SQ heparin)        >4:   High Risk: Thromboprophylaxis required (enoxaparin or SQ heparin), optional add IPC  **If CONTRAINDICATION to enoxaparin or SQ heparin, USE IPCs**

## 2021-01-07 NOTE — H&P ADULT - NSHPLABSRESULTS_GEN_ALL_CORE
13.2   15.78 )-----------( 176      ( 06 Jan 2021 23:15 )             37.9       01-06    136  |  98  |  24<H>  ----------------------------<  117<H>  3.5   |  26  |  1.05    Ca    8.7      06 Jan 2021 23:15    TPro  7.1  /  Alb  3.3  /  TBili  0.4  /  DBili  x   /  AST  36  /  ALT  51<H>  /  AlkPhos  59  01-06    Lactate, Blood: 2.2 mmol/L (01-06 @ 23:15)       LIVER FUNCTIONS - ( 06 Jan 2021 23:15 )  Alb: 3.3 g/dL / Pro: 7.1 g/dL / ALK PHOS: 59 U/L / ALT: 51 U/L / AST: 36 U/L / GGT: x               PT/INR - ( 06 Jan 2021 23:15 )   PT: 16.6 sec;   INR: 1.39 ratio         PTT - ( 06 Jan 2021 23:15 )  PTT:25.6 sec        EKG: NSR at 85bpm, Qv1-v2 no acute ST changes      CXR: wet read RLL infiltrate

## 2021-01-07 NOTE — H&P ADULT - NSICDXPASTMEDICALHX_GEN_ALL_CORE_FT
PAST MEDICAL HISTORY:  Hypertension, unspecified type     Multifocal pneumonia     Small cell carcinoma of lung

## 2021-01-08 ENCOUNTER — TRANSCRIPTION ENCOUNTER (OUTPATIENT)
Age: 76
End: 2021-01-08

## 2021-01-08 PROBLEM — C34.90 MALIGNANT NEOPLASM OF UNSPECIFIED PART OF UNSPECIFIED BRONCHUS OR LUNG: Chronic | Status: ACTIVE | Noted: 2021-01-06

## 2021-01-08 PROBLEM — J18.9 PNEUMONIA, UNSPECIFIED ORGANISM: Chronic | Status: ACTIVE | Noted: 2021-01-07

## 2021-01-08 PROBLEM — I10 ESSENTIAL (PRIMARY) HYPERTENSION: Chronic | Status: ACTIVE | Noted: 2021-01-06

## 2021-01-08 LAB
ANION GAP SERPL CALC-SCNC: 9 MMOL/L — SIGNIFICANT CHANGE UP (ref 5–17)
BUN SERPL-MCNC: 16 MG/DL — SIGNIFICANT CHANGE UP (ref 7–23)
CALCIUM SERPL-MCNC: 8.5 MG/DL — SIGNIFICANT CHANGE UP (ref 8.4–10.5)
CHLORIDE SERPL-SCNC: 96 MMOL/L — SIGNIFICANT CHANGE UP (ref 96–108)
CO2 SERPL-SCNC: 29 MMOL/L — SIGNIFICANT CHANGE UP (ref 22–31)
CREAT SERPL-MCNC: 0.99 MG/DL — SIGNIFICANT CHANGE UP (ref 0.5–1.3)
CULTURE RESULTS: NO GROWTH — SIGNIFICANT CHANGE UP
GLUCOSE SERPL-MCNC: 104 MG/DL — HIGH (ref 70–99)
HCT VFR BLD CALC: 36.4 % — LOW (ref 39–50)
HGB BLD-MCNC: 11.9 G/DL — LOW (ref 13–17)
MCHC RBC-ENTMCNC: 30.3 PG — SIGNIFICANT CHANGE UP (ref 27–34)
MCHC RBC-ENTMCNC: 32.7 GM/DL — SIGNIFICANT CHANGE UP (ref 32–36)
MCV RBC AUTO: 92.6 FL — SIGNIFICANT CHANGE UP (ref 80–100)
NRBC # BLD: 0 /100 WBCS — SIGNIFICANT CHANGE UP (ref 0–0)
PLATELET # BLD AUTO: 150 K/UL — SIGNIFICANT CHANGE UP (ref 150–400)
POTASSIUM SERPL-MCNC: 3.6 MMOL/L — SIGNIFICANT CHANGE UP (ref 3.5–5.3)
POTASSIUM SERPL-SCNC: 3.6 MMOL/L — SIGNIFICANT CHANGE UP (ref 3.5–5.3)
PROCALCITONIN SERPL-MCNC: 0.09 NG/ML — HIGH
RBC # BLD: 3.93 M/UL — LOW (ref 4.2–5.8)
RBC # FLD: 12.2 % — SIGNIFICANT CHANGE UP (ref 10.3–14.5)
SARS-COV-2 IGG SERPL QL IA: NEGATIVE — SIGNIFICANT CHANGE UP
SARS-COV-2 IGM SERPL IA-ACNC: <3.8 AU/ML — SIGNIFICANT CHANGE UP
SODIUM SERPL-SCNC: 134 MMOL/L — LOW (ref 135–145)
SPECIMEN SOURCE: SIGNIFICANT CHANGE UP
WBC # BLD: 14.01 K/UL — HIGH (ref 3.8–10.5)
WBC # FLD AUTO: 14.01 K/UL — HIGH (ref 3.8–10.5)

## 2021-01-08 PROCEDURE — 99223 1ST HOSP IP/OBS HIGH 75: CPT

## 2021-01-08 PROCEDURE — 99233 SBSQ HOSP IP/OBS HIGH 50: CPT

## 2021-01-08 RX ORDER — HEPARIN SODIUM 5000 [USP'U]/ML
5000 INJECTION INTRAVENOUS; SUBCUTANEOUS EVERY 12 HOURS
Refills: 0 | Status: DISCONTINUED | OUTPATIENT
Start: 2021-01-08 | End: 2021-01-09

## 2021-01-08 RX ADMIN — CEFTRIAXONE 100 MILLIGRAM(S): 500 INJECTION, POWDER, FOR SOLUTION INTRAMUSCULAR; INTRAVENOUS at 00:16

## 2021-01-08 RX ADMIN — ALBUTEROL 2 PUFF(S): 90 AEROSOL, METERED ORAL at 21:55

## 2021-01-08 RX ADMIN — Medication 650 MILLIGRAM(S): at 00:17

## 2021-01-08 RX ADMIN — AZITHROMYCIN 255 MILLIGRAM(S): 500 TABLET, FILM COATED ORAL at 00:48

## 2021-01-08 RX ADMIN — ALBUTEROL 2 PUFF(S): 90 AEROSOL, METERED ORAL at 15:38

## 2021-01-08 RX ADMIN — HEPARIN SODIUM 5000 UNIT(S): 5000 INJECTION INTRAVENOUS; SUBCUTANEOUS at 18:52

## 2021-01-08 RX ADMIN — PANTOPRAZOLE SODIUM 40 MILLIGRAM(S): 20 TABLET, DELAYED RELEASE ORAL at 05:14

## 2021-01-08 RX ADMIN — BUDESONIDE AND FORMOTEROL FUMARATE DIHYDRATE 2 PUFF(S): 160; 4.5 AEROSOL RESPIRATORY (INHALATION) at 09:14

## 2021-01-08 RX ADMIN — BUDESONIDE AND FORMOTEROL FUMARATE DIHYDRATE 2 PUFF(S): 160; 4.5 AEROSOL RESPIRATORY (INHALATION) at 21:52

## 2021-01-08 RX ADMIN — METHADONE HYDROCHLORIDE 15 MILLIGRAM(S): 40 TABLET ORAL at 12:47

## 2021-01-08 RX ADMIN — AMLODIPINE BESYLATE 10 MILLIGRAM(S): 2.5 TABLET ORAL at 05:14

## 2021-01-08 RX ADMIN — Medication 40 MILLIGRAM(S): at 05:14

## 2021-01-08 RX ADMIN — ALBUTEROL 2 PUFF(S): 90 AEROSOL, METERED ORAL at 09:12

## 2021-01-08 NOTE — DISCHARGE NOTE PROVIDER - NSDCCPCAREPLAN_GEN_ALL_CORE_FT
PRINCIPAL DISCHARGE DIAGNOSIS  Diagnosis: Pneumonia  Assessment and Plan of Treatment: -complete full course of antibiotics, continue home oxygen, steroids and inhalers  -if you have worsening shortness of breath, fevers, severe cough, continued blood in your sputum call your MD immediately or come to the ER      SECONDARY DISCHARGE DIAGNOSES  Diagnosis: COPD with hypoxia  Assessment and Plan of Treatment: follow up with your Pulmonologist in 1 week, continue steroids until you are advised to stop by your Doctor    Diagnosis: Small cell carcinoma of lung  Assessment and Plan of Treatment: follow up with your Oncologist in the next 1-2 weeks

## 2021-01-08 NOTE — DISCHARGE NOTE PROVIDER - NSDCFUSCHEDAPPT_GEN_ALL_CORE_FT
SRAVANTHI JAN H ; 01/13/2021 ; [facility] [address]  SRAVANTHI JAN H ; 01/13/2021 ; [facility] [address]  SRAVANTHI JAN H ; 01/21/2021 ; [facility] [address]

## 2021-01-08 NOTE — DISCHARGE NOTE PROVIDER - HOSPITAL COURSE
Hospital Course  75M hx of COPD, HTN, on methadone for chronic back pain and compression fx, metastatic small cell lung cancer MIRELLA with mediastinal LAD, liver, adrenal mets s/p chemo last over 3 months ago with improvement in recent CT now on immunotherapy q3 weeks (next due 1/12) followed at [facility]. He presents with increasing SOB and cough for over a week and had a CT chest angio 1/4/2020 ordered by oncologist with noted RML, RLL and LLL pneumonia and negative PE (pt has copy of report on his phone) and was treated with Levaquin and  maintained on Pred 15mg qd. Was also treated with doxy and prednisone in mid December for similar sxs. Initially had some yellow phlegm with occ blood streaks but this am had about 2 episodes of hemoptysis with about a tsp of blood clot each time and was seen in ED earlier today and D/C. Upon return home, this evening had a coughing fit and had about 6-7 'chunks' of blood clots about a tsp each assoc with worsening SOB and called EMS. +subjective fevers, chills, +R sided rib pain if severe cough, +wheezing. Denied CP, NVD, dysuria, loss of taste or smell. EMS noted sat 80% on RA. In ED, febrile 100.6 P:90 sat 100% on NRB now 97% on 4LNC. wbc:15.8 lact:2.2 INR:1.39 s/p sepsis fluids and IV Levaquin in ED. Admitted to hosputalist.   CT chest with multifocal PNA.  Started on IV CTX and AZithro.  Taken off NRB as soon as patient arrived on floor and started on NC.  Initially required 4 L NC and Oxygen was tapered down to ___ L during hospital course.       Patient already has home oxygen at home and is stable for discharge home     Source of Infection:  Antibiotic / Last Day: Ceftin __ days and Azithro __ days     Palliative Care / Advanced Care Planning  Code Status:  Patient/Family agreeable to Hospice/Palliative (Y/N)?  Summary of Goals of Care Conversation:    Discharging Provider:  DAVID Head  Contact Info: Cell [phone] - Please call with any questions or concerns.    Outpatient Provider: DR Zavala     Signout given to SNF Provider: N/A      **RESULTS**  < from: CT Chest w/wo IV Cont (01.07.21 @ 01:11) >    IMPRESSION:    Negative for pulmonary emboli though detailed evaluation the distal segmental and subsegmental branches limited secondary tosuboptimal contrast bolus within these vessels.    Previously visualized left upper lobe mass is no longer visualized in the current study, query interval resection or treatment. Scattered bilateral patchy opacities, most pronounced in right lower lobe with confluency. Findings raising possibility of multifocal pneumonia. Short-term pulmonary imaging in 6 weeks following treatment is advised to demonstrate resolution. This limits detection of subtle underlying lung nodules.    Left upper lobe bronchus appears attenuated with adjacent soft tissue thickening. Consider nonemergent bronchoscopy if there is concern for endobronchial extension of malignancy or metastasis.    Mediastinal lymphadenopathy improved. Interval progression of right hilar lymph nodes.    Multilevel degenerative changes of the spine noted. Interval mild compression deformity of T8 of indeterminate age.    < end of copied text >           Hospital Course  75M hx of COPD, HTN, on methadone for chronic back pain and compression fx, metastatic small cell lung cancer MIRELLA with mediastinal LAD, liver, adrenal mets s/p chemo last over 3 months ago with improvement in recent CT now on immunotherapy q3 weeks (next due 1/12) followed at [facility]. He presents with increasing SOB and cough for over a week and had a CT chest angio 1/4/2020 ordered by oncologist with noted RML, RLL and LLL pneumonia and negative PE (pt has copy of report on his phone) and was treated with Levaquin and  maintained on Pred 15mg qd.  Was also treated with doxy and prednisone in mid December for similar sxs. Initially had some yellow phlegm and then had 2 episodes of hemoptysis and worsening SOB.  CT chest with multifocal PNA.  Started on IV CTX and AZithro.  Taken off NRB as soon as patient arrived on floor and started on NC.  Initially required 4 L NC and Oxygen was tapered down to 2L, sats were 93%.    He was evaluated by Pulmonology and Hematology/Oncology.     He was stable for discharge on oral steroids and oral antibx.  Patient already has home oxygen -- will continue on 2 liters via nc.    Source of Infection:  PNA  Antibiotic / Last Day: Ceftin    Palliative Care / Advanced Care Planning  Code Status:  FULL CODE  Patient/Family agreeable to Hospice/Palliative- NO    Discharging Provider:  DAVID Coates    Contact Info: Cell [phone]  Please call with any questions or concerns.    Outpatient Provider: DR Zavala     **RESULTS**  < from: CT Chest w/wo IV Cont (01.07.21 @ 01:11) >    IMPRESSION:    Negative for pulmonary emboli though detailed evaluation the distal segmental and subsegmental branches limited secondary tosuboptimal contrast bolus within these vessels.    Previously visualized left upper lobe mass is no longer visualized in the current study, query interval resection or treatment. Scattered bilateral patchy opacities, most pronounced in right lower lobe with confluency. Findings raising possibility of multifocal pneumonia. Short-term pulmonary imaging in 6 weeks following treatment is advised to demonstrate resolution. This limits detection of subtle underlying lung nodules.    Left upper lobe bronchus appears attenuated with adjacent soft tissue thickening. Consider nonemergent bronchoscopy if there is concern for endobronchial extension of malignancy or metastasis.    Mediastinal lymphadenopathy improved. Interval progression of right hilar lymph nodes.    Multilevel degenerative changes of the spine noted. Interval mild compression deformity of T8 of indeterminate age.    < end of copied text >           Hospital Course  75M hx of COPD, HTN, on methadone for chronic back pain and compression fx, metastatic small cell lung cancer MIRELLA with mediastinal LAD, liver, adrenal mets s/p chemo last over 3 months ago with improvement in recent CT now on immunotherapy q3 weeks (next due 1/12) followed at [facility]. He presents with increasing SOB and cough for over a week and had a CT chest angio 1/4/2020 ordered by oncologist with noted RML, RLL and LLL pneumonia and negative PE (pt has copy of report on his phone) and was treated with Levaquin and  maintained on Pred 15mg qd.  Was also treated with doxy and prednisone in mid December for similar sxs. Initially had some yellow phlegm and then had 2 episodes of hemoptysis and worsening SOB.  CT chest with multifocal PNA.  Started on IV CTX and AZithro.  Taken off NRB as soon as patient arrived on floor and started on NC.  Initially required 4 L NC and Oxygen was tapered down to 2L, sats were 93%.    He was evaluated by Pulmonology and Hematology/Oncology.       He was stable for discharge on oral steroids and oral antibx.  Patient already has home oxygen -- will continue on 2 liters via nc.    Source of Infection:  PNA  Antibiotic / Last Day: Ceftin    Palliative Care / Advanced Care Planning  Code Status:  FULL CODE  Patient/Family agreeable to Hospice/Palliative- NO    Discharging Provider:  DAVID Coates    Contact Info: Cell [phone]  Please call with any questions or concerns.    Outpatient Provider: DR Zavala     **RESULTS**  < from: CT Chest w/wo IV Cont (01.07.21 @ 01:11) >    IMPRESSION:    Negative for pulmonary emboli though detailed evaluation the distal segmental and subsegmental branches limited secondary tosuboptimal contrast bolus within these vessels.    Previously visualized left upper lobe mass is no longer visualized in the current study, query interval resection or treatment. Scattered bilateral patchy opacities, most pronounced in right lower lobe with confluency. Findings raising possibility of multifocal pneumonia. Short-term pulmonary imaging in 6 weeks following treatment is advised to demonstrate resolution. This limits detection of subtle underlying lung nodules.    Left upper lobe bronchus appears attenuated with adjacent soft tissue thickening. Consider nonemergent bronchoscopy if there is concern for endobronchial extension of malignancy or metastasis.    Mediastinal lymphadenopathy improved. Interval progression of right hilar lymph nodes.    Multilevel degenerative changes of the spine noted. Interval mild compression deformity of T8 of indeterminate age.    < end of copied text >

## 2021-01-08 NOTE — PROGRESS NOTE ADULT - ASSESSMENT
75M hx of COPD, HTN, on methadone for chronic back pain and compression fx, metastatic small cell lung cancer MIRELLA with mediastinal LAD, liver, adrenal mets s/p chemo last over 3 months ago with improvement in recent CT now on immunotherapy q3 weeks pw cough, SOB and acute hypoxic respiratory failure with hemoptysis and multilobar pna.     *Acute Hypoxic Respiratory Failure:  *Multilobar PNA:  Cont CTX and Azithro-day 3  Monitor O2 sat--on 4 L--downtitrate (patient newly has O2 at home--can discharge home when O2 requirement improves, ie 2 L )  Pulm consult appreciated  CT chest with multifocal PNA  Fever curve, trend WBC  RVP negative  Pulm consult appreciated  Encourage incentive jared      *Leukocytosis:  improving  due to infection  cont abx    *HTN:  Cont norvasc    *Chronic back pain:  Cont methadone    *Metastatic small cell lung cancer MIRELLA with mediastinal LAD, liver, adrenal mets:  s/p chemo last over 3 months ago  now on immunotherapy q 3 weeks    Dispo: home when oxygen requirement improves.  Already has O2 at home. Wife, Yuliya updated on care   DVT ppx: SQH 75M hx of COPD, HTN, on methadone for chronic back pain and compression fx, metastatic small cell lung cancer MIRELLA with mediastinal LAD, liver, adrenal mets s/p chemo last over 3 months ago with improvement in recent CT now on immunotherapy q3 weeks pw cough, SOB and acute hypoxic respiratory failure with hemoptysis and multilobar pna.     *Acute Hypoxic Respiratory Failure:  *Multilobar PNA:  Cont CTX and Azithro-day 3  Monitor O2 sat--on 4 L--downtitrate (patient newly has O2 at home--can discharge home when O2 requirement improves, ie 2 L )  Pulm consult appreciated  CT chest with multifocal PNA  Fever curve, trend WBC  RVP negative  Pulm consult appreciated  Encourage incentive jared      *Leukocytosis:  improving  due to infection  cont abx    *HTN:  Cont norvasc    *Chronic back pain:  Cont methadone    *Metastatic small cell lung cancer MIRELLA with mediastinal LAD, liver, adrenal mets:  s/p chemo last over 3 months ago  now on immunotherapy q 3 weeks    Dispo: home when oxygen requirement improves.  Already has O2 at home. Called wife, Yuliya-mail box is full  DVT ppx: SQH 75M hx of COPD, HTN, on methadone for chronic back pain and compression fx, metastatic small cell lung cancer MIRELLA with mediastinal LAD, liver, adrenal mets s/p chemo last over 3 months ago with improvement in recent CT now on immunotherapy q3 weeks pw cough, SOB and acute hypoxic respiratory failure with hemoptysis and multilobar pna.     *Acute Hypoxic Respiratory Failure:  *Multilobar PNA:  *Metastatic Lung Cancer  Cont CTX and Azithro-day 3  Monitor O2 sat--on 4 L--downtitrate (patient newly has O2 at home--can discharge home when O2 requirement improves, ie 2 L )  Pulm consult appreciated  CT chest with multifocal PNA  Fever curve, trend WBC  RVP negative  Pulm consult appreciated  Encourage incentive jared      *Leukocytosis:  improving  due to infection  cont abx    *HTN:  Cont norvasc    *Chronic back pain:  Cont methadone    *Metastatic small cell lung cancer MIRELLA with mediastinal LAD, liver, adrenal mets:  s/p chemo last over 3 months ago  now on immunotherapy q 3 weeks    Dispo: home when oxygen requirement improves.  Already has O2 at home. Called wife, Yuliya-mail box is full  DVT ppx: SQH 75M hx of COPD, HTN, on methadone for chronic back pain and compression fx, metastatic small cell lung cancer MIRELLA with mediastinal LAD, liver, adrenal mets s/p chemo last over 3 months ago with improvement in recent CT now on immunotherapy q3 weeks pw cough, SOB and acute hypoxic respiratory failure with hemoptysis and multilobar pna.     *Acute Hypoxic Respiratory Failure:  *Multilobar PNA:  *Metastatic Lung Cancer  Cont CTX and Azithro-day 3  Monitor O2 sat--on 4 L--downtitrate (patient newly has O2 at home--can discharge home when O2 requirement improves, ie 2 L )  Pulm consult appreciated  CT chest with multifocal PNA  Fever curve, trend WBC  RVP negative  Pulm consult appreciated  Encourage incentive jared  Hematology eval for ? immune response to immunotherapy--Dr Reynolds will see patient       *Leukocytosis:  improving  due to infection  cont abx    *HTN:  Cont norvasc    *Chronic back pain:  Cont methadone    *Metastatic small cell lung cancer MIRELLA with mediastinal LAD, liver, adrenal mets:  s/p chemo last over 3 months ago  now on immunotherapy q 3 weeks    Dispo: home when oxygen requirement improves.  Already has O2 at home. Called wife, Yuliya-mail box is full  DVT ppx: SQH

## 2021-01-08 NOTE — DISCHARGE NOTE PROVIDER - NSDCQMSTROKE_NEU_ALL_CORE
Dr Huerta, this patient is being followed by the High Risk in Care Transition program and it was noted in our call today that the patient said he was not on a fluid restriction.  Patient does have a follow up appointment with you tomorrow at 12 noon.  Can a fluid restriction be discussed with the patient tomorrow?    Thank you,    Hoa SALASN, RN  Transition Nurse  
Patient had his office visit with Dr Huerta, but the issue about having a fluid restriction was not addressed in the notes and no answer was obtained with this note.  Will have to speak with the patient tomorrow and see what he says and if need be call Dr Huerta's office to ask about a fluid restriction.  
No

## 2021-01-08 NOTE — DISCHARGE NOTE PROVIDER - CARE PROVIDER_API CALL
Casey Zavala  INTERNAL MEDICINE  [address], [address]  [place] [address]  Phone: [phone]  Fax: [phone]  Follow Up Time:

## 2021-01-08 NOTE — CONSULT NOTE ADULT - ASSESSMENT
Assessment:  75M hx of COPD, HTN, on methadone for chronic back pain and compression fx, metastatic small cell lung cancer MIRELLA with mediastinal LAD, liver, adrenal mets admitted with multifocal pneumonia with hemoptysis.     1. Acute on chronic hypoxia   2. Multifocal pneumonia  3. COPD   4. Hemoptysis   5. Small cell lung cancer  6. Hypertension    Plan  - Cont. IV antibiotics  - Short course of steroids  - Cont. home inhalers  - Oxygen support   - monitor for further hemoptysis  - obtain RVP panel  - BP control  - Discussed with Dr. Espinoza
This is a 75 year old man with a history of COPD, HTN, chronic back pain, metastatic small cell lung cancer in the left upper lobe, metastasis to the liver, adrenal glands. Patient s/p chemotherapy which finished 3 months ago, now on maintainable immunotherapy Q 3 weeks.  Unclear which one, patient nor family could remember, could not find record on his [facility].  He only recalls it was recently approved in March 2020.  Given this information it was most likely Imfinzi (durvalumab).  Recent history of shortness of breath, CT angio ordered 1/4/2020 noted a RML, RLL and LLL pneumonia, negative PE. Treated with levaquin and placed on prednisone 15mg.  Patient presents for hemoptysis today, along with fevers, chills right sided rib pain POX 80%.  Repeat CT scan demonstrated no pulmonary emboli, left upper lobe mass no longer visualized, There are scattered bilateral patchy opacities with right lower lobe confluence.  Covid 19 negative.      Unclear what this opacity is, could represent progression of disease, versus immune pneumonitis secondary to the biologic therapy (probable Imfinzi), versus pneumonia.    Given the left upper lobe mass is resolved, doubt progression of disease.  Given history of administration of antibiotics, Ceftriaxone and azithromycin with some clinical improvement today, suspect the CT scan findings represent a pneumonia.   If does not respond to antibiotics, or respiratory symptoms worsen tomorrow, would recommend an increase in dose of prednisone.  Typical dose for autoimmune pneumonitis would be 1mg/kg daily or 80mg of prednisone daily.

## 2021-01-08 NOTE — DISCHARGE NOTE PROVIDER - NSDCMRMEDTOKEN_GEN_ALL_CORE_FT
Albuterol (Eqv-ProAir HFA) 90 mcg/inh inhalation aerosol: 2 puff(s) inhaled every 6 hours  amLODIPine 10 mg oral tablet: 1 tab(s) orally once a day  Levaquin 500 mg oral tablet: 1 tab(s) orally every 24 hours  methadone 10 mg oral tablet: 1.5 tab(s) orally once a day  predniSONE 5 mg oral tablet: 15 milligram(s) orally once a day  Symbicort 160 mcg-4.5 mcg/inh inhalation aerosol: 2 puff(s) inhaled 2 times a day   Albuterol (Eqv-ProAir HFA) 90 mcg/inh inhalation aerosol: 2 puff(s) inhaled every 6 hours  amLODIPine 10 mg oral tablet: 1 tab(s) orally once a day  cefuroxime 500 mg oral tablet: 1 tab(s) orally every 12 hours   methadone 10 mg oral tablet: 1.5 tab(s) orally once a day  predniSONE 10 mg oral tablet: 4 tab(s) orally once a day x 7 days  3 tab(s) orally once a day x 7 days  2 tab(s) orally once a day x 7 days  1 tab(s) orally once a day x 7 days  Symbicort 160 mcg-4.5 mcg/inh inhalation aerosol: 2 puff(s) inhaled 2 times a day

## 2021-01-08 NOTE — CONSULT NOTE ADULT - SKIN
A Catheter Mini Trek Slim Seal 2mm 15mm Rx Ultra was inflated in the diagonal artery.     The balloon was inflated at 8 justin for 23 seconds at 6/7/2019 7:36 AM.   No lesions; no rash

## 2021-01-08 NOTE — PROGRESS NOTE ADULT - ATTENDING COMMENTS
I have personally seen and examined patient on the above date.  I discussed the case with Tamela Smith (PA)  and I agree with findings and plan as detailed per note above, which I have amended where appropriate.    Titrating O2 down to 2Ls, continue Abx

## 2021-01-08 NOTE — PROGRESS NOTE ADULT - ASSESSMENT
Physical Examination:  GENERAL:               Alert, Oriented, No acute distress.    HEENT:                    Pupils equal, reactive to light.  Symmetric. No JVD, Moist MM, no oral bleeding noted  PULM:                     Bilateral air entry, + right sided crackles,  No Rhonchi, +Wheezing  CVS:                         S1, S2,  No Murmur  ABD:                        Soft, nondistended, nontender, normoactive bowel sounds,   EXT:                         No edema, nontender, No Cyanosis or Clubbing   NEURO:                  Alert, oriented, interactive, nonfocal, follows commands  PSYC:                      Calm, + Insight.        Assessment and Recommendation:   · Assessment	  Assessment:  75M hx of COPD, HTN, on methadone for chronic back pain and compression fx, metastatic small cell lung cancer MIRELLA with mediastinal LAD, liver, adrenal mets admitted with multifocal pneumonia with hemoptysis.     1. Acute on chronic hypoxia   2. Multifocal pneumonia vs pneumonitis from immunotherapy   3. COPD   4. Hemoptysis   5. Small cell lung cancer  6. Hypertension    Plan  - Cont. IV antibiotics   - Short course of steroids  - Cont. home inhalers  - Oxygen support , taper as tolerated  - BP control  - Heme eval  - case d/w hospitalist team.

## 2021-01-09 ENCOUNTER — TRANSCRIPTION ENCOUNTER (OUTPATIENT)
Age: 76
End: 2021-01-09

## 2021-01-09 VITALS — OXYGEN SATURATION: 95 %

## 2021-01-09 PROCEDURE — 99239 HOSP IP/OBS DSCHRG MGMT >30: CPT

## 2021-01-09 RX ORDER — CEFUROXIME AXETIL 250 MG
1 TABLET ORAL
Qty: 6 | Refills: 0
Start: 2021-01-09 | End: 2021-01-11

## 2021-01-09 RX ORDER — CIPROFLOXACIN LACTATE 400MG/40ML
1 VIAL (ML) INTRAVENOUS
Qty: 0 | Refills: 0 | DISCHARGE

## 2021-01-09 RX ADMIN — AZITHROMYCIN 255 MILLIGRAM(S): 500 TABLET, FILM COATED ORAL at 01:56

## 2021-01-09 RX ADMIN — PANTOPRAZOLE SODIUM 40 MILLIGRAM(S): 20 TABLET, DELAYED RELEASE ORAL at 05:36

## 2021-01-09 RX ADMIN — HEPARIN SODIUM 5000 UNIT(S): 5000 INJECTION INTRAVENOUS; SUBCUTANEOUS at 05:36

## 2021-01-09 RX ADMIN — ALBUTEROL 2 PUFF(S): 90 AEROSOL, METERED ORAL at 15:47

## 2021-01-09 RX ADMIN — CEFTRIAXONE 100 MILLIGRAM(S): 500 INJECTION, POWDER, FOR SOLUTION INTRAMUSCULAR; INTRAVENOUS at 01:56

## 2021-01-09 RX ADMIN — Medication 40 MILLIGRAM(S): at 05:36

## 2021-01-09 RX ADMIN — BUDESONIDE AND FORMOTEROL FUMARATE DIHYDRATE 2 PUFF(S): 160; 4.5 AEROSOL RESPIRATORY (INHALATION) at 08:43

## 2021-01-09 RX ADMIN — METHADONE HYDROCHLORIDE 15 MILLIGRAM(S): 40 TABLET ORAL at 13:48

## 2021-01-09 RX ADMIN — AMLODIPINE BESYLATE 10 MILLIGRAM(S): 2.5 TABLET ORAL at 05:36

## 2021-01-09 RX ADMIN — ALBUTEROL 2 PUFF(S): 90 AEROSOL, METERED ORAL at 08:43

## 2021-01-09 NOTE — PROGRESS NOTE ADULT - ATTENDING COMMENTS
I have personally seen and examined patient on the above date.  I discussed the case with Cheyenne Lea (PA)  and I agree with findings and plan as detailed per note above, which I have amended where appropriate.    O2 at 2L's today. PO Abx and PO steroids. D/C today

## 2021-01-09 NOTE — DISCHARGE NOTE NURSING/CASE MANAGEMENT/SOCIAL WORK - PATIENT PORTAL LINK FT
You can access the FollowMyHealth Patient Portal offered by [facility] by registering at the following website: [email or web address]. By joining [facility]’s FollowMyHealth portal, you will also be able to view your health information using other applications (apps) compatible with our system.

## 2021-01-09 NOTE — PROGRESS NOTE ADULT - ASSESSMENT
75M hx of COPD, HTN, on methadone for chronic back pain and compression fx, metastatic small cell lung cancer MIRELLA with mediastinal LAD, liver, adrenal mets s/p chemo last over 3 months ago with improvement in recent CT now on immunotherapy q3 weeks pw cough, SOB and acute hypoxic respiratory failure with hemoptysis and multilobar pna.     *Acute Hypoxic Respiratory Failure:  *Multilobar PNA:  *Metastatic Lung Cancer/hempotysis  Cont CTX and Azithro-day 3  Monitor O2 sat; currently on 2L of nc, sats 93% (patient newly has O2 at home)  Pulm consult appreciated; cont antibx, steroids, resp treatments  CT chest with multifocal PNA  Fever curve, trend WBC  RVP negative  Encourage incentive spirometry  Hematology eval noted; upper lobe lung opacity resolving and pt is responding to antibx, does not think it is progression of dz    *Leukocytosis:  improving, wbc 14; secondary to infection  cont abx    *HTN:  Cont norvasc    *Chronic back pain:  Cont methadone    *Metastatic small cell lung cancer MIRELLA with mediastinal LAD, liver, adrenal mets:  s/p chemo last over 3 months ago  now on immunotherapy q 3 weeks  oncology note appreciated    DVT ppx: SQH 75M hx of COPD, HTN, on methadone for chronic back pain and compression fx, metastatic small cell lung cancer MIRELLA with mediastinal LAD, liver, adrenal mets s/p chemo last over 3 months ago with improvement in recent CT now on immunotherapy q3 weeks pw cough, SOB and acute hypoxic respiratory failure with hemoptysis and multilobar pna.     *Acute Hypoxic Respiratory Failure:  *Multilobar PNA:  *Metastatic Lung Cancer/hempotysis  Cont CTX and Azithro-day 3  Monitor O2 sat; currently on 2L of nc, sats 93% (patient newly has O2 at home)  Pulm consult appreciated; cont antibx, steroids, resp treatments  CT chest with multifocal PNA  Fever curve, trend WBC  RVP negative  Encourage incentive spirometry  Hematology eval noted; upper lobe lung opacity resolving and pt is responding to antibx, does not think it is progression of dz    *Leukocytosis:  improving, wbc 14; secondary to infection  cont abx    *HTN:  Cont norvasc    *Chronic back pain:  Cont methadone    *Metastatic small cell lung cancer MIRELLA with mediastinal LAD, liver, adrenal mets:  s/p chemo last over 3 months ago  now on immunotherapy q 3 weeks  oncology note appreciated    DVT ppx: SQH    Medically stable for D/C  TIme spent on D/C 34 mins

## 2021-01-09 NOTE — PROGRESS NOTE ADULT - ASSESSMENT
Physical Examination:  GENERAL:               Alert, Oriented, No acute distress.    HEENT:                    Pupils equal, reactive to light.  Symmetric. No JVD, Moist MM, no oral bleeding noted  PULM:                     Bilateral air entry, No crackles,  No Rhonchi, No Wheezing  CVS:                         S1, S2,  No Murmur  ABD:                        Soft, nondistended, nontender, normoactive bowel sounds,   EXT:                         No edema, nontender, No Cyanosis or Clubbing   NEURO:                  Alert, oriented, interactive, nonfocal, follows commands  PSYC:                      Calm, + Insight.    Assessment:    75M hx of COPD, HTN, on methadone for chronic back pain and compression fx, metastatic small cell lung cancer MIRELLA with mediastinal LAD, liver, adrenal mets admitted with multifocal pneumonia with hemoptysis.     1. Acute on chronic hypoxia   2. Multifocal pneumonia vs pneumonitis from immunotherapy   3. COPD   4. Hemoptysis   5. Small cell lung cancer  6. Hypertension    Plan  - Cont. IV antibiotics can consider to change to ceftin as symptoms improved  - Continue steroids, will taper 10 mg/week till off till meed primary team  - doubt acute pneumanitis   - Cont. home inhalers  - Oxygen support , taper as tolerated  - BP control  - Heme eval  - case d/w hospitalist team.

## 2021-01-09 NOTE — PROGRESS NOTE ADULT - SUBJECTIVE AND OBJECTIVE BOX
Follow-up Pulmonary Progress Note  Chief Complaint : Pneumonia due to organism      patient seen and examined  comfortable  no cp, sob, palp, n/v  hemoptsis improved, but continues.       Allergies :amoxicillin (Hives)      PAST MEDICAL & SURGICAL HISTORY:  Multifocal pneumonia    Hypertension, unspecified type    Small cell carcinoma of lung        Medications:  MEDICATIONS  (STANDING):  ALBUTerol    90 MICROgram(s) HFA Inhaler 2 Puff(s) Inhalation every 6 hours  amLODIPine   Tablet 10 milliGRAM(s) Oral daily  azithromycin  IVPB      azithromycin  IVPB 500 milliGRAM(s) IV Intermittent every 24 hours  budesonide 160 MICROgram(s)/formoterol 4.5 MICROgram(s) Inhaler 2 Puff(s) Inhalation two times a day  cefTRIAXone   IVPB 1000 milliGRAM(s) IV Intermittent every 24 hours  heparin   Injectable 5000 Unit(s) SubCutaneous every 12 hours  methadone    Tablet 15 milliGRAM(s) Oral daily  pantoprazole    Tablet 40 milliGRAM(s) Oral before breakfast  predniSONE   Tablet 40 milliGRAM(s) Oral daily    MEDICATIONS  (PRN):  acetaminophen   Tablet .. 650 milliGRAM(s) Oral every 6 hours PRN Temp greater or equal to 38C (100.4F), Mild Pain (1 - 3)      LABS:                        11.9   14.01 )-----------( 150      ( 2021 05:45 )             36.4     01-08    134<L>  |  96  |  16  ----------------------------<  104<H>  3.6   |  29  |  0.99    Ca    8.5      2021 06:18    TPro  6.6  /  Alb  2.9<L>  /  TBili  0.4  /  DBili  x   /  AST  40  /  ALT  48<H>  /  AlkPhos  62  01-07            PT/INR - ( 2021 23:15 )   PT: 16.6 sec;   INR: 1.39 ratio         PTT - ( 2021 23:15 )  PTT:25.6 sec  Urinalysis Basic - ( 2021 01:15 )    Color: Yellow / Appearance: Clear / S.015 / pH: x  Gluc: x / Ketone: Negative  / Bili: Negative / Urobili: Negative   Blood: x / Protein: Negative / Nitrite: Negative   Leuk Esterase: Negative / RBC: 26-50 /HPF / WBC Negative /HPF   Sq Epi: x / Non Sq Epi: Neg.-Few / Bacteria: Negative /HPF      Procalcitonin, Serum: 0.09 ng/mL (21 @ 06:18)          CULTURES: (if applicable)    Culture - Urine (collected 21 @ 01:15)  Source: .Urine Clean Catch (Midstream)  Final Report (21 @ 13:32):    No growth    Culture - Blood (collected 21 @ 23:30)  Source: .Blood Blood-Peripheral  Preliminary Report (21 @ 09:01):    No growth to date.    Culture - Blood (collected 21 @ 23:15)  Source: .Blood Blood-Peripheral  Preliminary Report (21 @ 09:01):    No growth to date.      Rapid RVP Result: NotDetec (21 @ 12:00)        CAPILLARY BLOOD GLUCOSE          RADIOLOGY    < from: CT Chest w/wo IV Cont (21 @ 01:11) >    EXAM:  CT CHEST WAW IC      PROCEDURE DATE:  2021        INTERPRETATION:  CT CHEST WITHOUT AND WITH IV CONTRAST    INDICATION: Hemoptysis. Left upper lobe mass.    TECHNIQUE: Pre and post contrast enhanced CT imaging of the thorax. Timing optimized for the pulmonary arteries. Coronal and sagittal reformatted images are provided. Postprocessed MIP reformatted images were created and reviewed.    90 mL of Omnipaque 350 contrast material was injected IV.    COMPARISON: CT chest 2020.    FINDINGS:    Pulmonary arteries: No filling defects to suggest pulmonary emboli though detailed evaluation the distal segmental and subsegmental branches limited secondary to suboptimal contrast bolus within these vessels..  Aorta: No aneurysm. Atherosclerotic disease of the aorta and its branches.  Lines and tubes: None.  Airways: Patent central airways. Left upper lobe bronchus appears attenuated with adjacent soft tissue thickening.  Lungs and Pleura: Centrilobular emphysematous changes. Previously visualized left upper lobe mass is no longer visualized in the current study, query interval resection or treatment. Scattered bilateral patchy opacities, most pronounced in right lower lobe with confluency. This limits detection of subtle underlying lung nodules. No pleural effusion or pneumothorax.  Mediastinum and lymph nodes: Improvement in mediastinal lymphadenopathy with reference precarinal lymph node measuring 1.9 x 1.2 cm previously measuring 2.5 x 3 cm. Right hilar lymph node measures 2.5 x 1.7 cm, appears enlarged compared to the prior study.  Heart: Normal size. No pericardial effusion. Coronary artery calcifications.  Chest wall: Within normal limits.    Upper Abdomen: No acute abnormality.    Bones and soft tissues: Multilevel degenerative changes of the spine noted. Interval mild compression deformity of T8 of indeterminate age.    IMPRESSION:    Negative for pulmonary emboli though detailed evaluation the distal segmental and subsegmental branches limited secondary tosuboptimal contrast bolus within these vessels.    Previously visualized left upper lobe mass is no longer visualized in the current study, query interval resection or treatment. Scattered bilateral patchy opacities, most pronounced in right lower lobe with confluency. Findings raising possibility of multifocal pneumonia. Short-term pulmonary imaging in 6 weeks following treatment is advised to demonstrate resolution. This limits detection of subtle underlying lung nodules.    Left upper lobe bronchus appears attenuated with adjacent soft tissue thickening. Consider nonemergent bronchoscopy if there is concern for endobronchial extension of malignancy or metastasis.    Mediastinal lymphadenopathy improved. Interval progression of right hilar lymph nodes.    Multilevel degenerative changes of the spine noted. Interval mild compression deformity of T8 of indeterminate age.            < end of copied text >      VITALS:  T(C): 36.7 (21 @ 15:54), Max: 38.6 (21 @ 00:47)  T(F): 98 (21 @ 15:54), Max: 101.4 (21 @ 00:47)  HR: 86 (21 @ 15:54) (71 - 86)  BP: 113/67 (21 @ 15:54) (107/62 - 125/62)  BP(mean): --  ABP: --  ABP(mean): --  RR: 16 (21 @ 15:54) (15 - 18)  SpO2: 95% (21 @ 15:54) (94% - 98%)  CVP(mm Hg): --  CVP(cm H2O): --    Ins and Outs     21 @ 07:01  -  21 @ 07:00  --------------------------------------------------------  IN: 600 mL / OUT: 0 mL / NET: 600 mL    21 @ 07:01  -  21 @ 17:03  --------------------------------------------------------  IN: 440 mL / OUT: 0 mL / NET: 440 mL        Height (cm): 180.3 (21 @ 03:05), 177.8 (21 @ 16:47)  Weight (kg): 85.6 (21 @ 03:05), 83.9 (21 @ 16:47)  BMI (kg/m2): 26.3 (21 @ 03:05), 26.5 (21 @ 16:47)        I&O's Detail    2021 07:  -  2021 07:00  --------------------------------------------------------  IN:    Oral Fluid: 600 mL  Total IN: 600 mL    OUT:  Total OUT: 0 mL    Total NET: 600 mL      2021 07:  -  2021 17:03  --------------------------------------------------------  IN:    Oral Fluid: 440 mL  Total IN: 440 mL    OUT:  Total OUT: 0 mL    Total NET: 440 mL            
Patient is a 75y old  Male who presents with a chief complaint of hemoptysis, pna (2021 20:54)    Patient seen and examined at bedside.  Pt. c/o dry cough and intermittent shortness of breath.      ALLERGIES:  amoxicillin (Hives)    MEDICATIONS  (STANDING):  ALBUTerol    90 MICROgram(s) HFA Inhaler 2 Puff(s) Inhalation every 6 hours  amLODIPine   Tablet 10 milliGRAM(s) Oral daily  azithromycin  IVPB      azithromycin  IVPB 500 milliGRAM(s) IV Intermittent every 24 hours  budesonide 160 MICROgram(s)/formoterol 4.5 MICROgram(s) Inhaler 2 Puff(s) Inhalation two times a day  cefTRIAXone   IVPB 1000 milliGRAM(s) IV Intermittent every 24 hours  heparin   Injectable 5000 Unit(s) SubCutaneous every 12 hours  methadone    Tablet 15 milliGRAM(s) Oral daily  pantoprazole    Tablet 40 milliGRAM(s) Oral before breakfast  predniSONE   Tablet 40 milliGRAM(s) Oral daily    MEDICATIONS  (PRN):  acetaminophen   Tablet .. 650 milliGRAM(s) Oral every 6 hours PRN Temp greater or equal to 38C (100.4F), Mild Pain (1 - 3)    Vital Signs Last 24 Hrs  T(F): 98.2 (2021 10:37), Max: 98.4 (2021 00:09)  HR: 80 (2021 10:37) (72 - 86)  BP: 101/60 (2021 10:37) (101/60 - 137/71)  RR: 16 (2021 10:37) (16 - 16)  SpO2: 93% (2021 10:37) (93% - 98%)  I&O's Summary    2021 07:01  -  2021 07:00  --------------------------------------------------------  IN: 440 mL / OUT: 0 mL / NET: 440 mL      PHYSICAL EXAM:  General: NAD, A/O x 3, coughing.  ENT: MMM, no thrush  Neck: Supple, No JVD  Lungs: non-labored breathing, +rhonchi b/l at lower lung fields, scant wheezing   Cardio: RRR, S1/S2, No murmurs  Abdomen: Soft, Nontender, Nondistended; Bowel sounds present  Extremities: No calf tenderness, No pitting edema  Neuro:  no focal deficits    LABS:                        11.9   14.01 )-----------( 150      ( 2021 05:45 )             36.4         134  |  96  |  16  ----------------------------<  104  3.6   |  29  |  0.99    Ca    8.5      2021 06:18    TPro  6.6  /  Alb  2.9  /  TBili  0.4  /  DBili  x   /  AST  40  /  ALT  48  /  AlkPhos  62      eGFR if Non African American: 75 mL/min/1.73M2 (21 @ 06:18)  eGFR if African American: 87 mL/min/1.73M2 (21 @ 06:18)    PT/INR - ( 2021 23:15 )   PT: 16.6 sec;   INR: 1.39 ratio         PTT - ( 2021 23:15 )  PTT:25.6 sec  Lactate, Blood: 0.8 mmol/L ( @ 01:55)  Lactate, Blood: 2.2 mmol/L ( @ 23:15)                          Urinalysis Basic - ( 2021 01:15 )    Color: Yellow / Appearance: Clear / S.015 / pH: x  Gluc: x / Ketone: Negative  / Bili: Negative / Urobili: Negative   Blood: x / Protein: Negative / Nitrite: Negative   Leuk Esterase: Negative / RBC: 26-50 /HPF / WBC Negative /HPF   Sq Epi: x / Non Sq Epi: Neg.-Few / Bacteria: Negative /HPF        Culture - Urine (collected 2021 01:15)  Source: .Urine Clean Catch (Midstream)  Final Report (2021 13:32):    No growth    Culture - Blood (collected 2021 23:30)  Source: .Blood Blood-Peripheral  Preliminary Report (2021 09:01):    No growth to date.    Culture - Blood (collected 2021 23:15)  Source: .Blood Blood-Peripheral  Preliminary Report (2021 09:01):    No growth to date.        RADIOLOGY & ADDITIONAL TESTS:    Care Discussed with Consultants/Other Providers:   
Follow-up Pulmonary Progress Note  Chief Complaint : Pneumonia due to organism      patient feeling better  hemoptysis resolved  no cough, secretions, no palp      Allergies :amoxicillin (Hives)      PAST MEDICAL & SURGICAL HISTORY:  Multifocal pneumonia    Hypertension, unspecified type    Small cell carcinoma of lung        Medications:  MEDICATIONS  (STANDING):  ALBUTerol    90 MICROgram(s) HFA Inhaler 2 Puff(s) Inhalation every 6 hours  amLODIPine   Tablet 10 milliGRAM(s) Oral daily  azithromycin  IVPB      azithromycin  IVPB 500 milliGRAM(s) IV Intermittent every 24 hours  budesonide 160 MICROgram(s)/formoterol 4.5 MICROgram(s) Inhaler 2 Puff(s) Inhalation two times a day  cefTRIAXone   IVPB 1000 milliGRAM(s) IV Intermittent every 24 hours  heparin   Injectable 5000 Unit(s) SubCutaneous every 12 hours  methadone    Tablet 15 milliGRAM(s) Oral daily  pantoprazole    Tablet 40 milliGRAM(s) Oral before breakfast  predniSONE   Tablet 40 milliGRAM(s) Oral daily    MEDICATIONS  (PRN):  acetaminophen   Tablet .. 650 milliGRAM(s) Oral every 6 hours PRN Temp greater or equal to 38C (100.4F), Mild Pain (1 - 3)      LABS:                        11.9   14.01 )-----------( 150      ( 08 Jan 2021 05:45 )             36.4     01-08    134<L>  |  96  |  16  ----------------------------<  104<H>  3.6   |  29  |  0.99    Ca    8.5      08 Jan 2021 06:18        Procalcitonin, Serum: 0.09 ng/mL (01-08-21 @ 06:18)          CULTURES: (if applicable)    Culture - Urine (collected 01-07-21 @ 01:15)  Source: .Urine Clean Catch (Midstream)  Final Report (01-08-21 @ 13:32):    No growth    Culture - Blood (collected 01-06-21 @ 23:30)  Source: .Blood Blood-Peripheral  Preliminary Report (01-08-21 @ 09:01):    No growth to date.    Culture - Blood (collected 01-06-21 @ 23:15)  Source: .Blood Blood-Peripheral  Preliminary Report (01-08-21 @ 09:01):    No growth to date.      Rapid RVP Result: NotDetec (01-07-21 @ 12:00)      VITALS:  T(C): 36.8 (01-09-21 @ 10:37), Max: 36.9 (01-09-21 @ 00:09)  T(F): 98.2 (01-09-21 @ 10:37), Max: 98.4 (01-09-21 @ 00:09)  HR: 80 (01-09-21 @ 10:37) (72 - 86)  BP: 101/60 (01-09-21 @ 10:37) (101/60 - 137/71)  BP(mean): --  ABP: --  ABP(mean): --  RR: 16 (01-09-21 @ 10:37) (16 - 16)  SpO2: 93% (01-09-21 @ 10:37) (93% - 98%)  CVP(mm Hg): --  CVP(cm H2O): --    Ins and Outs     01-08-21 @ 07:01  -  01-09-21 @ 07:00  --------------------------------------------------------  IN: 440 mL / OUT: 0 mL / NET: 440 mL    01-09-21 @ 07:01  -  01-09-21 @ 13:56  --------------------------------------------------------  IN: 300 mL / OUT: 0 mL / NET: 300 mL        Height (cm): 180.3 (01-07-21 @ 03:05), 177.8 (01-06-21 @ 16:47)  Weight (kg): 85.6 (01-07-21 @ 03:05), 83.9 (01-06-21 @ 16:47)  BMI (kg/m2): 26.3 (01-07-21 @ 03:05), 26.5 (01-06-21 @ 16:47)        I&O's Detail    08 Jan 2021 07:01  -  09 Jan 2021 07:00  --------------------------------------------------------  IN:    Oral Fluid: 440 mL  Total IN: 440 mL    OUT:  Total OUT: 0 mL    Total NET: 440 mL      09 Jan 2021 07:01  -  09 Jan 2021 13:56  --------------------------------------------------------  IN:    Oral Fluid: 300 mL  Total IN: 300 mL    OUT:  Total OUT: 0 mL    Total NET: 300 mL      
Patient is a 75y old  Male who presents with a chief complaint of hemoptysis, pna (2021 13:19)  Overall feeling stronger but still with SOB when exerting himself.     Patient seen and examined at bedside.    ALLERGIES:  amoxicillin (Hives)    MEDICATIONS  (STANDING):  ALBUTerol    90 MICROgram(s) HFA Inhaler 2 Puff(s) Inhalation every 6 hours  amLODIPine   Tablet 10 milliGRAM(s) Oral daily  azithromycin  IVPB      azithromycin  IVPB 500 milliGRAM(s) IV Intermittent every 24 hours  budesonide 160 MICROgram(s)/formoterol 4.5 MICROgram(s) Inhaler 2 Puff(s) Inhalation two times a day  cefTRIAXone   IVPB 1000 milliGRAM(s) IV Intermittent every 24 hours  methadone    Tablet 15 milliGRAM(s) Oral daily  pantoprazole    Tablet 40 milliGRAM(s) Oral before breakfast  predniSONE   Tablet 40 milliGRAM(s) Oral daily    MEDICATIONS  (PRN):  acetaminophen   Tablet .. 650 milliGRAM(s) Oral every 6 hours PRN Temp greater or equal to 38C (100.4F), Mild Pain (1 - 3)    Vital Signs Last 24 Hrs  T(F): 98.5 (2021 05:22), Max: 101.4 (2021 00:47)  HR: 71 (2021 05:22) (71 - 89)  BP: 125/62 (2021 05:22) (118/69 - 129/58)  RR: 16 (2021 05:22) (15 - 18)  SpO2: 95% (2021 05:22) (91% - 98%)  I&O's Summary    2021 07:01  -  2021 07:00  --------------------------------------------------------  IN: 600 mL / OUT: 0 mL / NET: 600 mL      BMI (kg/m2): 26.3 (21 @ 03:05), 26.5 (21 @ 16:47)  PHYSICAL EXAM:  General: NAD, A/O x 3  ENT: MMM, no thrush, 4L NC   Neck: Supple, No JVD  Lungs: Non labored breathing,  Clear to auscultation bilaterally,   Cardio: RRR, S1/S2,  no pitting edema bilaterally  Abdomen: Soft, Nontender, Nondistended; Bowel sounds present  Extremities: No calf tenderness, moves all extremities    LABS:                        11.9   14.01 )-----------( 150      ( 2021 05:45 )             36.4           134  |  96  |  16  ----------------------------<  104  3.6   |  29  |  0.99    Ca    8.5      2021 06:18    TPro  6.6  /  Alb  2.9  /  TBili  0.4  /  DBili  x   /  AST  40  /  ALT  48  /  AlkPhos  62       eGFR if Non African American: 75 mL/min/1.73M2 (21 @ 06:18)  eGFR if African American: 87 mL/min/1.73M2 (21 @ 06:18)    PT/INR - ( 2021 23:15 )   PT: 16.6 sec;   INR: 1.39 ratio         PTT - ( 2021 23:15 )  PTT:25.6 sec   Lactate, Blood: 0.8 mmol/L ( @ 01:55)  Lactate, Blood: 2.2 mmol/L ( @ 23:15)                          Urinalysis Basic - ( 2021 01:15 )    Color: Yellow / Appearance: Clear / S.015 / pH: x  Gluc: x / Ketone: Negative  / Bili: Negative / Urobili: Negative   Blood: x / Protein: Negative / Nitrite: Negative   Leuk Esterase: Negative / RBC: 26-50 /HPF / WBC Negative /HPF   Sq Epi: x / Non Sq Epi: Neg.-Few / Bacteria: Negative /HPF        Culture - Blood (collected 2021 23:30)  Source: .Blood Blood-Peripheral  Preliminary Report (2021 09:01):    No growth to date.    Culture - Blood (collected 2021 23:15)  Source: .Blood Blood-Peripheral  Preliminary Report (2021 09:01):    No growth to date.        RADIOLOGY & ADDITIONAL TESTS:    Care Discussed with Consultants/Other Providers:

## 2021-01-09 NOTE — DISCHARGE NOTE NURSING/CASE MANAGEMENT/SOCIAL WORK - NSTRANSFERBELONGINGSRESP_GEN_A_NUR
Vaccine Information Statement(s) was given today. This has been reviewed, questions answered, and verbal consent given by Patient for injection(s) and administration of Tetanus/Diphtheria/Pertussis (Tdap).      Patient tolerated without incident. See immunization grid for documentation.     yes

## 2021-01-11 ENCOUNTER — NON-APPOINTMENT (OUTPATIENT)
Age: 76
End: 2021-01-11

## 2021-01-11 DIAGNOSIS — J18.9 PNEUMONIA, UNSPECIFIED ORGANISM: ICD-10-CM

## 2021-01-12 LAB
CULTURE RESULTS: SIGNIFICANT CHANGE UP
CULTURE RESULTS: SIGNIFICANT CHANGE UP
HCV RNA FLD QL NAA+PROBE: SIGNIFICANT CHANGE UP
SPECIMEN SOURCE: SIGNIFICANT CHANGE UP
SPECIMEN SOURCE: SIGNIFICANT CHANGE UP

## 2021-01-13 ENCOUNTER — APPOINTMENT (OUTPATIENT)
Dept: PULMONOLOGY | Facility: CLINIC | Age: 76
End: 2021-01-13

## 2021-01-19 ENCOUNTER — NON-APPOINTMENT (OUTPATIENT)
Age: 76
End: 2021-01-19

## 2021-01-21 ENCOUNTER — APPOINTMENT (OUTPATIENT)
Dept: PULMONOLOGY | Facility: CLINIC | Age: 76
End: 2021-01-21
Payer: MEDICARE

## 2021-01-21 VITALS
TEMPERATURE: 97.6 F | HEART RATE: 80 BPM | OXYGEN SATURATION: 94 % | SYSTOLIC BLOOD PRESSURE: 119 MMHG | DIASTOLIC BLOOD PRESSURE: 64 MMHG

## 2021-01-21 DIAGNOSIS — R04.2 HEMOPTYSIS: ICD-10-CM

## 2021-01-21 DIAGNOSIS — J18.9 PNEUMONIA, UNSPECIFIED ORGANISM: ICD-10-CM

## 2021-01-21 PROCEDURE — 71046 X-RAY EXAM CHEST 2 VIEWS: CPT

## 2021-01-21 PROCEDURE — 85730 THROMBOPLASTIN TIME PARTIAL: CPT

## 2021-01-21 PROCEDURE — 80048 BASIC METABOLIC PNL TOTAL CA: CPT

## 2021-01-21 PROCEDURE — 87086 URINE CULTURE/COLONY COUNT: CPT

## 2021-01-21 PROCEDURE — 80053 COMPREHEN METABOLIC PANEL: CPT

## 2021-01-21 PROCEDURE — 83605 ASSAY OF LACTIC ACID: CPT

## 2021-01-21 PROCEDURE — 85610 PROTHROMBIN TIME: CPT

## 2021-01-21 PROCEDURE — 93005 ELECTROCARDIOGRAM TRACING: CPT

## 2021-01-21 PROCEDURE — 99495 TRANSJ CARE MGMT MOD F2F 14D: CPT | Mod: 25

## 2021-01-21 PROCEDURE — U0003: CPT

## 2021-01-21 PROCEDURE — 0225U NFCT DS DNA&RNA 21 SARSCOV2: CPT

## 2021-01-21 PROCEDURE — 86850 RBC ANTIBODY SCREEN: CPT

## 2021-01-21 PROCEDURE — U0005: CPT

## 2021-01-21 PROCEDURE — 86769 SARS-COV-2 COVID-19 ANTIBODY: CPT

## 2021-01-21 PROCEDURE — 99285 EMERGENCY DEPT VISIT HI MDM: CPT | Mod: 25

## 2021-01-21 PROCEDURE — 94640 AIRWAY INHALATION TREATMENT: CPT

## 2021-01-21 PROCEDURE — 81001 URINALYSIS AUTO W/SCOPE: CPT

## 2021-01-21 PROCEDURE — 71045 X-RAY EXAM CHEST 1 VIEW: CPT

## 2021-01-21 PROCEDURE — 99072 ADDL SUPL MATRL&STAF TM PHE: CPT

## 2021-01-21 PROCEDURE — 85025 COMPLETE CBC W/AUTO DIFF WBC: CPT

## 2021-01-21 PROCEDURE — 85027 COMPLETE CBC AUTOMATED: CPT

## 2021-01-21 PROCEDURE — 87040 BLOOD CULTURE FOR BACTERIA: CPT

## 2021-01-21 PROCEDURE — 36415 COLL VENOUS BLD VENIPUNCTURE: CPT

## 2021-01-21 PROCEDURE — 71270 CT THORAX DX C-/C+: CPT

## 2021-01-21 PROCEDURE — 86901 BLOOD TYPING SEROLOGIC RH(D): CPT

## 2021-01-21 PROCEDURE — 96365 THER/PROPH/DIAG IV INF INIT: CPT

## 2021-01-21 PROCEDURE — 86900 BLOOD TYPING SEROLOGIC ABO: CPT

## 2021-01-21 PROCEDURE — 84145 PROCALCITONIN (PCT): CPT

## 2021-01-21 PROCEDURE — 86803 HEPATITIS C AB TEST: CPT

## 2021-01-21 PROCEDURE — 87521 HEPATITIS C PROBE&RVRS TRNSC: CPT

## 2021-01-21 RX ORDER — DOXYCYCLINE HYCLATE 100 MG/1
100 CAPSULE ORAL
Qty: 14 | Refills: 0 | Status: DISCONTINUED | COMMUNITY
Start: 2020-11-24 | End: 2021-01-21

## 2021-01-21 NOTE — PHYSICAL EXAM
[No Acute Distress] : no acute distress [No Neck Mass] : no neck mass [Normal Rate/Rhythm] : normal rate/rhythm [Normal S1, S2] : normal s1, s2 [No Murmurs] : no murmurs [Wheeze] : wheeze [No Abnormalities] : no abnormalities [Benign] : benign [Normal Gait] : normal gait [No Clubbing] : no clubbing [No Cyanosis] : no cyanosis [No Edema] : no edema [FROM] : FROM [Normal Color/ Pigmentation] : normal color/ pigmentation [Oriented x3] : oriented x3 [Normal Affect] : normal affect [General Appearance - Well Developed] : well developed [Normal Appearance] : normal appearance [Well Groomed] : well groomed [General Appearance - Well Nourished] : well nourished [No Deformities] : no deformities [General Appearance - In No Acute Distress] : no acute distress [Eyelids - No Xanthelasma] : the eyelids demonstrated no xanthelasmas [Normal Conjunctiva] : the conjunctiva exhibited no abnormalities [Normal Oropharynx] : normal oropharynx [I] : I [Neck Appearance] : the appearance of the neck was normal [Neck Cervical Mass (___cm)] : no neck mass was observed [Jugular Venous Distention Increased] : there was no jugular-venous distention [Thyroid Diffuse Enlargement] : the thyroid was not enlarged [Heart Rate And Rhythm] : heart rate and rhythm were normal [Heart Sounds] : normal S1 and S2 [Murmurs] : no murmurs present [Arterial Pulses Normal] : the arterial pulses were normal [Edema] : no peripheral edema present [Veins - Varicosity Changes] : no varicosital changes were noted in the lower extremities [Respiration, Rhythm And Depth] : normal respiratory rhythm and effort [Exaggerated Use Of Accessory Muscles For Inspiration] : no accessory muscle use [Chest Palpation] : palpation of the chest revealed no abnormalities [Lungs Percussion] : the lungs were normal to percussion [Bowel Sounds] : normal bowel sounds [Abdomen Soft] : soft [Abdomen Tenderness] : non-tender [Abdomen Mass (___ Cm)] : no abdominal mass palpated [Abnormal Walk] : normal gait [Gait - Sufficient For Exercise Testing] : the gait was sufficient for exercise testing [Nail Clubbing] : no clubbing of the fingernails [Cyanosis, Localized] : no localized cyanosis [Petechial Hemorrhages (___cm)] : no petechial hemorrhages [] : no ischemic changes [Deep Tendon Reflexes (DTR)] : deep tendon reflexes were 2+ and symmetric [Sensation] : the sensory exam was normal to light touch and pinprick [No Focal Deficits] : no focal deficits [Oriented To Time, Place, And Person] : oriented to person, place, and time [Impaired Insight] : insight and judgment were intact [Affect] : the affect was normal [FreeTextEntry1] : Bilateral prolonged expiratory phase with diffuse wheeze rhonchi with interval  improvement and no wheeze just prolonged expiratory phase

## 2021-01-21 NOTE — DISCUSSION/SUMMARY
Called pt mother to let her know that surgery arrival time has been changed to 10am. No answer left a voicemail for the pt mother to call me back.   
Pt mother called back I let her know that surgery arrival time has been changed to 10am. Pt mother verbalized understanding.   
[FreeTextEntry1] : Post discharge multifocal pneumonia\par Post hemoptysis with almost complete resolution\par  metastatic small cell lung carcinoma-completed chemotherapy-currently on immunotherapy protocol [facility]\par Pending to start immunotherapy biologic treatment protocol as per [facility]\par  COPD- Emphysema\par post COPD flare  but not back on controller therapy -Restart\par Vitamin D insufficiency\par HCV positive with RNA NEGATIVE no indication for Consult tx\par Vitamin D supplementation over-the-counter 2000 units daily\par Hypothyroidism on  synthroid\par Symbicort and Spiriva- SAMPLE BREZTRI 2 puffs BID and Rinse\par reorder steroids- completed steroids with retx \par prn ventolin ALEXANDRO\par Addressed short-term follow-up chest CT upon return to office and/or with [facility]\par Issue of bronchoscopy addressed I will check laboratory work\par Advised patient should get Covid vaccination\par

## 2021-01-21 NOTE — REVIEW OF SYSTEMS
[As Noted in HPI] : as noted in HPI [Reflux] : reflux [Nocturia] : nocturia [Fracture] : fracture [Back Pain] : ~T back pain [Negative] : Sleep Disorder [Fever] : no fever [Trauma] : no ~T physical trauma [Kyphoscoliosis] : no kyphoscoliosis [Myalgias] : no myalgias [Arthralgias] : no arthralgias [Raynaud] : no Raynaud's phenomenon was observed [Scleroderma] : no scleroderma [Rash] : no [unfilled] rash [Itch] : no itching [Ulcerations] : no ulcerations [Telangiectasias] : no telangiectasias [FreeTextEntry7] : History of viral hepatitis B\par  [de-identified] : Prior history of herpes zoster, shingles

## 2021-01-21 NOTE — PROCEDURE
[FreeTextEntry1] : Chest x-ray PA lateral January 21, 2021\par Increased markings more predominant right versus left lower lobe\par No dominant pulmonary nodules masses appreciated\par No pleural effusion\par Mild calcification aortic knob\par Compared to chest x-ray following technique changes 12/20/2020 no interval change noted\par \par NIOX  74  ppb  consistent with bronchial inflammation\par PFT November 30, 2020\par Moderate obstructive ventilatory impairment\par No response to bronchodilator at FEV1\par Lung volumes are normal\par Total lung capacity 95% predicted.\par No significant air trapping with RV/TLC ratio 124% predicted.\par Severe reduction diffusion 48% predicted with a loss of functioning alveolar capillary units\par Hemoglobin 13.4\par \par CT scan completed through [facility] of lung cancer with restaging\par Scan completed on October 20, 2020\par Severe panlobular emphysema\par Spiculated left upper lobe nodule continues to elongate and decrease in size now measuring 2.0 x 1.1 cm\par Pleural thickening left costophrenic angle\par Atelectasis bases\par Mild unchanged reticulation at the lung bases compatible with developing fibrosis\par Mild peribronchial thickening\par Tracheobronchial essentially patent\par Mediastinal hilar lymph nodes stable borderline enlarged mediastinal lymph node\par Right paratracheal lymph node 1.1 cm\par Coronary artery calcification\par Impression\par Decreased size spiculated left upper lobe nodule\par Stable borderline enlarged mediastinal adenopathy that has decreased in size compared to the prior several studies\par Stable filling defect within the proximal left superior pulmonary vein\par \par Data reviewed CT chest with IV contrast August 11, 2020\par Interval resolution of the left suprahilar and anterior mediastinal lymphadenopathy\par Pretracheal lymph node 10 mm\par Subcentimeter lymph nodes AP window and paratracheal\par No supraclavicular adenopathy\par \par Severe pulmonary emphysema\par \par Spiculated nodule in 3 left upper lobe 1.9 x 1.3 cm decreased in size since the prior study went where it measured 2.3 x 1.8\par Pleural thickening\par Atelectasis left base\par Mild reticular markings with traction bronchiectasis right lung base rule out early signs of mild interstitial fibrosis\par No dominant pulmonary nodules right lung\par Mild diffuse peribronchial thickening\par Overall impression\par Interval with marked decrease in the extent of lymphadenopathy\par Residual lymphadenopathy noted\par Decrease in the size of the left upper lobe mass lesion\par Also reported a filling defect noted in the proximal left superior pulmonary vein reported 10 mm concern whether this is thrombus versus extension of the mass into the left knee.  Pulmonary vein\par CT abdomen pelvis\par We will decrease in the size of a metastatic segment 6 liver lesion measuring 1.3 cm\par Near complete resolution of the metastatic adenopathy in the teresa hepatis and precaval lymph node stations\par Prior demonstrated left adrenal mets no longer visualized\par No new evidence of new metastatic disease in the abdomen or pelvis\par Laboratory data\par Comprehensive metabolic profile unremarkable\par CBC hematocrit 33 but otherwise normal\par \par \par PFT March 13, 2020 moderate obstructive ventilatory impairment\par No bronchodilator response at FEV1\par Air trapping with RV/TLC ratio 156% of predicted.\par Diffusion 79% of predicted.\par Stable flow rates but noted significant interval improvement dating back to December 20, 2019\par \par FENO 57  ppb chest with bronchial inflammation\par \par Chest x-ray PA lateral normal cardiac size Franki  3 2020\par Dirty lungs consistent with known COPD\par Fullness left hilum\par No clear infiltrate noted\par \par X-ray PA lateral December 20, 2019\par Follow-up\par Normal cardiac size\par Mild calcification aortic knob\par COPD changes\par Some suggestion of mild interval clearing of the left lower lobe but persistent abnormality noted\par \par \par Spirometry  December 13, 2019\par Severe obstructive ventilatory impairment\par 12% response to bronchodilator at the FEV1\par Significant decline in flow rates compared to earlier data\par Bronchodilator provided albuterol via nebulizer inhalational treatment\par \par PFT Nov 13 2019\par Moderate obstructive ventilatory impairment\par No  response to bronchodilator at FEV1\par Normal lung volumes\par  Mild Moderate reduction diffusion 63% of predicted with a loss of functioning alveolocapillary units\par Compared to April 4, 2019 there is interval improvement of flow rates diffusion and stable total lung capacity\par \par EKG 4/18/2019\par NSR \par r/o old anterior wall MI \par Noted no change compared EKG 2016\par \par Blood Draw\par Increase Synthroid to 50 mcg Saturday and Sunday and continue 25 mc data review April 19, 2019\par CBC White blood count 5.15 hemoglobin 12.5 hematocrit 39.4 .8\par Platelet count 256,000\par Hemoglobin A1c 5.2 with mean plasma glucose 103\par Vitamin D 25.3 data review April 19, 2019\par CBC\par White blood count just above normal 12.76 hemoglobin 13.9 hematocrit 41.1 platelet count 291,000\par Hemoglobin A1c 5.7% with mean plasma glucose 117\par HCV weakly reactive  and pending HCV RNA qualitative test - NOTED HCV RNA is Negative\par Lipid profile\par Cholesterol 146 HDL 31 triglycerides 99 LDL 95\par PSA 0.58\par T4 free T4 TSH normal\par TSH 0.60\par Vitamin D 19.4 \par Serum electrolytes are normal\par Renal function normal\par BUN 12 creatinine 0.83\par AST 33\par ALT 48\par Alkaline phosphatase bilirubin normal \par \par  Prevnar IM   9/18/19\par High-dose flu vaccination administered November 4 2020

## 2021-01-21 NOTE — HISTORY OF PRESENT ILLNESS
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 [facility]\par Admitted to the hospital with hemoptysis\par Angiogram protocol negative for pulmonary emboli\par Prior visualized left upper lobe mass is not present\par Post treatment\par Scattered bilateral patchy opacities more pronounced right lower lobe with confluent\par \par Multifocal pneumonia\par Recommendation short-term imaging\par Left upper lobe bronchus attenuated with adjacent soft tissue thickening\par \par Patient on IV antibiotics\par Post discharge address issue of bronchoscopy might be best managed with [facility]\par Noted mediastinal adenopathy improved but there are some interval progression of the right hilum lymph nodes\par \par Noted 8 January 6, 2020 chest CT per [facility] completed with angiogram protocol was negative for pulmonary embolism moderate centrilobular emphysema pulmonary nodules no reported multifocal pneumonia with adenopathy reported [FreeTextEntry1] : Small cell metastatic lung cancer\par August scans demonstrated interval improvement\par \par COPD with an asthmatic component with incomplete but  wax  wane sxs  with slow  interval improvement\par less cough wheeze  chest  congestion\par  sputum cleared\par still pending lung  resection at [place]\par \par Hypothyroidism on Synthroid 25 mg 5 days/week and Saturday Sunday 50 mg daily\par \par Pulmonary consultation\par 73-year-old with a chief complaint of chronic significant chest congestion associated with difficulty breathing\par He states he has wheeze that is associated with shortness of breath\par Sputum that is light green in color\par No reported hemoptysis\par No fevers chills or sweats\par No lower extremity edema\par No chest pain pleuritic chest pain exertional chest pain former smoker with a approximately 31-year tobacco history\par 's he states he had a 15-year timeframe where he was not smoking cigarettes\par He informs me he started smoking the age of 27 completed tobacco discontinuation approximate 1 month ago with a 15-year in between low and a total of 31-year pack history\par Treated at [facility] with Ventolin doxycycline and finished  5-day course of prednisone\par He still has significant symptomatology as noted above\par Occupation \par He states he had pneumonia approximately 3 years ago\par Reports childhood bronchitis\par \par \par

## 2021-01-21 NOTE — REASON FOR VISIT
[Lung Cancer] : lung cancer [Asthma] : asthma [Shortness of Breath] : shortness of breath [Follow-Up] : a follow-up visit [COPD] : COPD [Wheezing] : wheezing

## 2021-01-22 ENCOUNTER — NON-APPOINTMENT (OUTPATIENT)
Age: 76
End: 2021-01-22

## 2021-01-22 LAB
ANION GAP SERPL CALC-SCNC: 13 MMOL/L
BASOPHILS # BLD AUTO: 0.02 K/UL
BASOPHILS NFR BLD AUTO: 0.2 %
BUN SERPL-MCNC: 21 MG/DL
CALCIUM SERPL-MCNC: 8.9 MG/DL
CHLORIDE SERPL-SCNC: 100 MMOL/L
CO2 SERPL-SCNC: 24 MMOL/L
CREAT SERPL-MCNC: 1.04 MG/DL
EOSINOPHIL # BLD AUTO: 0 K/UL
EOSINOPHIL NFR BLD AUTO: 0 %
GLUCOSE SERPL-MCNC: 93 MG/DL
HCT VFR BLD CALC: 43.5 %
HGB BLD-MCNC: 14.2 G/DL
IMM GRANULOCYTES NFR BLD AUTO: 1.2 %
LYMPHOCYTES # BLD AUTO: 1.34 K/UL
LYMPHOCYTES NFR BLD AUTO: 11.1 %
MAN DIFF?: NORMAL
MCHC RBC-ENTMCNC: 30.7 PG
MCHC RBC-ENTMCNC: 32.6 GM/DL
MCV RBC AUTO: 94.2 FL
MONOCYTES # BLD AUTO: 0.54 K/UL
MONOCYTES NFR BLD AUTO: 4.5 %
NEUTROPHILS # BLD AUTO: 10.02 K/UL
NEUTROPHILS NFR BLD AUTO: 83 %
PLATELET # BLD AUTO: 224 K/UL
POTASSIUM SERPL-SCNC: 4.5 MMOL/L
RBC # BLD: 4.62 M/UL
RBC # FLD: 13 %
SARS-COV-2 IGG SERPL IA-ACNC: <0.1 INDEX
SARS-COV-2 IGG SERPL QL IA: NEGATIVE
SODIUM SERPL-SCNC: 137 MMOL/L
WBC # FLD AUTO: 12.06 K/UL

## 2021-02-06 ENCOUNTER — INPATIENT (INPATIENT)
Facility: HOSPITAL | Age: 76
LOS: 0 days | Discharge: HOME | DRG: 189 | End: 2021-02-07
Attending: STUDENT IN AN ORGANIZED HEALTH CARE EDUCATION/TRAINING PROGRAM | Admitting: HOSPITALIST
Payer: MEDICARE

## 2021-02-06 VITALS
WEIGHT: 184.97 LBS | TEMPERATURE: 98 F | HEIGHT: 71 IN | SYSTOLIC BLOOD PRESSURE: 151 MMHG | HEART RATE: 83 BPM | DIASTOLIC BLOOD PRESSURE: 89 MMHG | OXYGEN SATURATION: 96 % | RESPIRATION RATE: 17 BRPM

## 2021-02-06 DIAGNOSIS — J18.9 PNEUMONIA, UNSPECIFIED ORGANISM: ICD-10-CM

## 2021-02-06 LAB
ALBUMIN SERPL ELPH-MCNC: 3.7 G/DL — SIGNIFICANT CHANGE UP (ref 3.3–5)
ALP SERPL-CCNC: 50 U/L — SIGNIFICANT CHANGE UP (ref 40–120)
ALT FLD-CCNC: 41 U/L — SIGNIFICANT CHANGE UP (ref 10–45)
ANION GAP SERPL CALC-SCNC: 10 MMOL/L — SIGNIFICANT CHANGE UP (ref 5–17)
APTT BLD: 26.6 SEC — LOW (ref 27.5–35.5)
AST SERPL-CCNC: 24 U/L — SIGNIFICANT CHANGE UP (ref 10–40)
BASOPHILS # BLD AUTO: 0.02 K/UL — SIGNIFICANT CHANGE UP (ref 0–0.2)
BASOPHILS NFR BLD AUTO: 0.4 % — SIGNIFICANT CHANGE UP (ref 0–2)
BILIRUB SERPL-MCNC: 0.4 MG/DL — SIGNIFICANT CHANGE UP (ref 0.2–1.2)
BUN SERPL-MCNC: 22 MG/DL — SIGNIFICANT CHANGE UP (ref 7–23)
CALCIUM SERPL-MCNC: 8.3 MG/DL — LOW (ref 8.4–10.5)
CHLORIDE SERPL-SCNC: 102 MMOL/L — SIGNIFICANT CHANGE UP (ref 96–108)
CO2 SERPL-SCNC: 26 MMOL/L — SIGNIFICANT CHANGE UP (ref 22–31)
CREAT SERPL-MCNC: 1.01 MG/DL — SIGNIFICANT CHANGE UP (ref 0.5–1.3)
EOSINOPHIL # BLD AUTO: 0.01 K/UL — SIGNIFICANT CHANGE UP (ref 0–0.5)
EOSINOPHIL NFR BLD AUTO: 0.2 % — SIGNIFICANT CHANGE UP (ref 0–6)
GLUCOSE SERPL-MCNC: 147 MG/DL — HIGH (ref 70–99)
HCT VFR BLD CALC: 39 % — SIGNIFICANT CHANGE UP (ref 39–50)
HGB BLD-MCNC: 13.3 G/DL — SIGNIFICANT CHANGE UP (ref 13–17)
IMM GRANULOCYTES NFR BLD AUTO: 0.4 % — SIGNIFICANT CHANGE UP (ref 0–1.5)
INR BLD: 1.05 RATIO — SIGNIFICANT CHANGE UP (ref 0.88–1.16)
LACTATE SERPL-SCNC: 1.6 MMOL/L — SIGNIFICANT CHANGE UP (ref 0.7–2)
LYMPHOCYTES # BLD AUTO: 0.65 K/UL — LOW (ref 1–3.3)
LYMPHOCYTES # BLD AUTO: 12.6 % — LOW (ref 13–44)
MCHC RBC-ENTMCNC: 30.4 PG — SIGNIFICANT CHANGE UP (ref 27–34)
MCHC RBC-ENTMCNC: 34.1 GM/DL — SIGNIFICANT CHANGE UP (ref 32–36)
MCV RBC AUTO: 89.2 FL — SIGNIFICANT CHANGE UP (ref 80–100)
MONOCYTES # BLD AUTO: 0.25 K/UL — SIGNIFICANT CHANGE UP (ref 0–0.9)
MONOCYTES NFR BLD AUTO: 4.9 % — SIGNIFICANT CHANGE UP (ref 2–14)
NEUTROPHILS # BLD AUTO: 4.2 K/UL — SIGNIFICANT CHANGE UP (ref 1.8–7.4)
NEUTROPHILS NFR BLD AUTO: 81.5 % — HIGH (ref 43–77)
NRBC # BLD: 0 /100 WBCS — SIGNIFICANT CHANGE UP (ref 0–0)
NT-PROBNP SERPL-SCNC: 208 PG/ML — SIGNIFICANT CHANGE UP (ref 0–300)
PLATELET # BLD AUTO: 143 K/UL — LOW (ref 150–400)
POTASSIUM SERPL-MCNC: 4 MMOL/L — SIGNIFICANT CHANGE UP (ref 3.5–5.3)
POTASSIUM SERPL-SCNC: 4 MMOL/L — SIGNIFICANT CHANGE UP (ref 3.5–5.3)
PROT SERPL-MCNC: 6.8 G/DL — SIGNIFICANT CHANGE UP (ref 6–8.3)
PROTHROM AB SERPL-ACNC: 12.7 SEC — SIGNIFICANT CHANGE UP (ref 10.6–13.6)
RBC # BLD: 4.37 M/UL — SIGNIFICANT CHANGE UP (ref 4.2–5.8)
RBC # FLD: 13.2 % — SIGNIFICANT CHANGE UP (ref 10.3–14.5)
SARS-COV-2 RNA SPEC QL NAA+PROBE: SIGNIFICANT CHANGE UP
SODIUM SERPL-SCNC: 138 MMOL/L — SIGNIFICANT CHANGE UP (ref 135–145)
TROPONIN I SERPL-MCNC: <.017 NG/ML — LOW (ref 0.02–0.06)
WBC # BLD: 5.15 K/UL — SIGNIFICANT CHANGE UP (ref 3.8–10.5)
WBC # FLD AUTO: 5.15 K/UL — SIGNIFICANT CHANGE UP (ref 3.8–10.5)

## 2021-02-06 PROCEDURE — 99223 1ST HOSP IP/OBS HIGH 75: CPT

## 2021-02-06 PROCEDURE — 71275 CT ANGIOGRAPHY CHEST: CPT | Mod: 26,MA

## 2021-02-06 PROCEDURE — 71045 X-RAY EXAM CHEST 1 VIEW: CPT | Mod: 26

## 2021-02-06 PROCEDURE — 99285 EMERGENCY DEPT VISIT HI MDM: CPT

## 2021-02-06 PROCEDURE — 93010 ELECTROCARDIOGRAM REPORT: CPT

## 2021-02-06 RX ORDER — METHADONE HYDROCHLORIDE 40 MG/1
15 TABLET ORAL DAILY
Refills: 0 | Status: DISCONTINUED | OUTPATIENT
Start: 2021-02-06 | End: 2021-02-07

## 2021-02-06 RX ORDER — CEFTRIAXONE 500 MG/1
1000 INJECTION, POWDER, FOR SOLUTION INTRAMUSCULAR; INTRAVENOUS EVERY 24 HOURS
Refills: 0 | Status: DISCONTINUED | OUTPATIENT
Start: 2021-02-06 | End: 2021-02-07

## 2021-02-06 RX ORDER — BUDESONIDE AND FORMOTEROL FUMARATE DIHYDRATE 160; 4.5 UG/1; UG/1
2 AEROSOL RESPIRATORY (INHALATION)
Refills: 0 | Status: DISCONTINUED | OUTPATIENT
Start: 2021-02-06 | End: 2021-02-07

## 2021-02-06 RX ORDER — ACETAMINOPHEN 500 MG
650 TABLET ORAL EVERY 6 HOURS
Refills: 0 | Status: DISCONTINUED | OUTPATIENT
Start: 2021-02-06 | End: 2021-02-07

## 2021-02-06 RX ORDER — ALBUTEROL 90 UG/1
1 AEROSOL, METERED ORAL EVERY 6 HOURS
Refills: 0 | Status: DISCONTINUED | OUTPATIENT
Start: 2021-02-06 | End: 2021-02-07

## 2021-02-06 RX ORDER — CIPROFLOXACIN LACTATE 400MG/40ML
1 VIAL (ML) INTRAVENOUS
Qty: 7 | Refills: 0
Start: 2021-02-06 | End: 2021-02-12

## 2021-02-06 RX ORDER — CALCIUM CARBONATE 500(1250)
1 TABLET ORAL ONCE
Refills: 0 | Status: COMPLETED | OUTPATIENT
Start: 2021-02-06 | End: 2021-02-07

## 2021-02-06 RX ORDER — AMLODIPINE BESYLATE 2.5 MG/1
10 TABLET ORAL DAILY
Refills: 0 | Status: DISCONTINUED | OUTPATIENT
Start: 2021-02-06 | End: 2021-02-07

## 2021-02-06 RX ORDER — OXYCODONE AND ACETAMINOPHEN 5; 325 MG/1; MG/1
1 TABLET ORAL ONCE
Refills: 0 | Status: DISCONTINUED | OUTPATIENT
Start: 2021-02-06 | End: 2021-02-06

## 2021-02-06 RX ORDER — ENOXAPARIN SODIUM 100 MG/ML
40 INJECTION SUBCUTANEOUS DAILY
Refills: 0 | Status: DISCONTINUED | OUTPATIENT
Start: 2021-02-06 | End: 2021-02-07

## 2021-02-06 RX ORDER — OXYCODONE AND ACETAMINOPHEN 5; 325 MG/1; MG/1
1 TABLET ORAL EVERY 6 HOURS
Refills: 0 | Status: DISCONTINUED | OUTPATIENT
Start: 2021-02-06 | End: 2021-02-07

## 2021-02-06 RX ADMIN — Medication 20 MILLIGRAM(S): at 21:42

## 2021-02-06 RX ADMIN — CEFTRIAXONE 100 MILLIGRAM(S): 500 INJECTION, POWDER, FOR SOLUTION INTRAMUSCULAR; INTRAVENOUS at 21:42

## 2021-02-06 RX ADMIN — BUDESONIDE AND FORMOTEROL FUMARATE DIHYDRATE 2 PUFF(S): 160; 4.5 AEROSOL RESPIRATORY (INHALATION) at 21:47

## 2021-02-06 NOTE — ED PROVIDER NOTE - ATTENDING CONTRIBUTION TO CARE
Dr. Wilson: I performed a face to face bedside interview with patient regarding history of present illness, review of symptoms and past medical history. I completed an independent physical exam.  I have discussed patient's plan of care with PA.   I agree with note as stated above, having amended the EMR as needed to reflect my findings.   This includes HISTORY OF PRESENT ILLNESS, HIV, PAST MEDICAL/SURGICAL/FAMILY/SOCIAL HISTORY, ALLERGIES AND HOME MEDICATIONS, REVIEW OF SYSTEMS, PHYSICAL EXAM, and any PROGRESS NOTES during the time I functioned as the attending physician for this patient.    see mdm

## 2021-02-06 NOTE — H&P ADULT - HISTORY OF PRESENT ILLNESS
75M PMH Lung cancer, COPD not on home O2 presents from home for shortness of breath.  Patient was recently admitted for pneumonia and discharged on 1/9/2021.  Patient states recently discharged on a steroid taper.  After he stopped taking steroids he began having worsening symptoms.      Denies fever, chills, nausea, vomiting, diarrhea.  No covid contacts.    In ER patient with hypoxia to 86% on ambulation

## 2021-02-06 NOTE — ED ADULT NURSE REASSESSMENT NOTE - NS ED NURSE REASSESS COMMENT FT1
Called lab for Quantiferon Tube, spoke to Dana who stated that phlebotomist has to get special tubing. Dr. Wilson made aware.

## 2021-02-06 NOTE — ED PROVIDER NOTE - PROGRESS NOTE DETAILS
DAVID Ruffin: labs reviewed. CTA chest results reviewed. no PE. +RUL PNA. will also send quantiferon test for r/o TB.  will dc with levaquin. pt stable for dc. return precautions given DAVID Ruffin: upon dc, pt does not want levaquin, he wants Ceftin. He reported feeling SOB. DAVID Ruffin: upon dc, pt does not want levaquin, he wants Ceftin. He reported feeling SOB, his walking sat was 87% on RA. will admit DAVID Ruffin: upon dc, pt does not want levaquin, he wants Ceftin. He reported feeling SOB, his walking sat was 87% on RA. He now reports his resting O2 Sat last night was 84% on RA with his home pulse ox monitor. Re-admission was d/w hospitalist Dr. Fishman

## 2021-02-06 NOTE — ED PROVIDER NOTE - CLINICAL SUMMARY MEDICAL DECISION MAKING FREE TEXT BOX
Dr. Wilson: 75M h/o COPD, lung ca with mets, no h/o DVT/PE, not on blood thinners, not on chemo anymore but is on immunotherapy, admitted 1 month ago for hemoptysis and found to have multifocal PNA (had CT chest with IV contrast, not a CTA), p/w 5 days of malaise, sob and hemoptysis with clots. No chest pain. Pt is concerned that he may be developing PNA again. No known covid exposure. On exam pt is well appearing, rectally afebrile, RRR, +mild diffuse wheezes bilaterally, abdo soft/nt/nd, no pedal edema or calf ttp. Will check labs, CTA r/o PNA vs PE, reassess. Pt is very well appearing. Dr. Wilson: 75M h/o COPD, lung ca with mets, no h/o DVT/PE, not on blood thinners, not on chemo anymore but is on immunotherapy, admitted 1 month ago for hemoptysis and found to have multifocal PNA (had CT chest with IV contrast, not a CTA), p/w 5 days of malaise, sob and hemoptysis with clots. No chest pain. Pt is concerned that he may be developing PNA again. No known covid exposure. On exam pt is well appearing, rectally afebrile, RRR, +mild diffuse wheezes bilaterally, abdo soft/nt/nd, no pedal edema or calf ttp. Will check labs, CTA r/o PNA vs PE, reassess. Pt is very well appearing.    On reassessment, pt desatting to 87% with walking. Will admit for hypoxia/PNA.   At 16:38 Pt does not meet bacterial sepsis criteria, afebrile, normal lactate, no leukcytosis, does not appear dehydrated. IVF held as concern for covid.

## 2021-02-06 NOTE — ED PROVIDER NOTE - PATIENT PORTAL LINK FT
You can access the FollowMyHealth Patient Portal offered by [facility] by registering at the following website: [email or web address]. By joining [employer]’s FollowMyHealth portal, you will also be able to view your health information using other applications (apps) compatible with our system.

## 2021-02-06 NOTE — ED PROVIDER NOTE - PRINCIPAL DIAGNOSIS
Hemoptysis Pneumonia of right upper lobe due to infectious organism HCAP (healthcare-associated pneumonia)

## 2021-02-06 NOTE — ED ADULT NURSE NOTE - NSIMPLEMENTINTERV_GEN_ALL_ED
Implemented All Universal Safety Interventions:  [place] to call system. Call bell, personal items and telephone within reach. Instruct patient to call for assistance. Room bathroom lighting operational. Non-slip footwear when patient is off stretcher. Physically safe environment: no spills, clutter or unnecessary equipment. Stretcher in lowest position, wheels locked, appropriate side rails in place.

## 2021-02-06 NOTE — ED PROVIDER NOTE - OBJECTIVE STATEMENT
74 y/o M with PMH of COPD, lung cancer, no longer on chemo, but on immunotherapy now, HTN, admitted 1 month ago for multifocal pneumonia presents to the ED with c/o SOB, hemoptysis and upper back pain x 5 days. He reports it feels similar to his multifocal pneumonia. he denies f/c, CP, n/v, abd pain, sick contacts, COVID contacts or all other complaints

## 2021-02-06 NOTE — CHART NOTE - NSCHARTNOTEFT_GEN_A_CORE
Event:  Reported by RN that pt requests to have antibiotic.  Pt was discharged home with ceftin on 1/9 for PNA. He reported after he finished steroid and ceftin, he started to have worsening cough and SOB on exertion. CTA showed new RUL patchy opacity compared to prior study. He stated he had greenish sputum and reported all the symptoms are similar to his prior PNA. His physical exam revealed diffusely wheezing.       PAST MEDICAL & SURGICAL HISTORY:  COPD, mild    Multifocal pneumonia    Hypertension, unspecified type    Small cell carcinoma of lung    No significant past surgical history      Allergies    amoxicillin (Hives)    Intolerances      FAMILY HISTORY:  No pertinent family history in first degree relatives        Review of Systems:  CONSTITUTIONAL: No fever, chills, or fatigue  EYES: No eye pain, visual disturbances, or discharge  ENMT:  No difficulty hearing, tinnitus, vertigo; No sinus or throat pain  NECK: No pain or stiffness  RESPIRATORY: No cough, wheezing, chills or hemoptysis; No shortness of breath  CARDIOVASCULAR: No chest pain, palpitations, dizziness, or leg swelling  GASTROINTESTINAL: No abdominal or epigastric pain. No nausea, vomiting, or hematemesis; No diarrhea or constipation. No melena or hematochezia.  GENITOURINARY: No dysuria, frequency, hematuria, or incontinence  NEUROLOGICAL: No headaches, memory loss, loss of strength, numbness, or tremors  SKIN: No itching, burning, rashes, or lesions   MUSCULOSKELETAL: No joint pain or swelling; No muscle, back, or extremity pain  PSYCHIATRIC: No depression, anxiety, mood swings, or difficulty sleeping      Medications:  cefTRIAXone   IVPB 1000 milliGRAM(s) IV Intermittent every 24 hours    amLODIPine   Tablet 10 milliGRAM(s) Oral daily    ALBUTerol    90 MICROgram(s) HFA Inhaler 1 Puff(s) Inhalation every 6 hours  budesonide 160 MICROgram(s)/formoterol 4.5 MICROgram(s) Inhaler 2 Puff(s) Inhalation two times a day    acetaminophen   Tablet .. 650 milliGRAM(s) Oral every 6 hours PRN  methadone    Tablet 15 milliGRAM(s) Oral daily  oxycodone    5 mG/acetaminophen 325 mG 1 Tablet(s) Oral every 6 hours PRN      enoxaparin Injectable 40 milliGRAM(s) SubCutaneous daily        predniSONE   Tablet 20 milliGRAM(s) Oral once              Vital Signs Last 24 Hrs  T(C): 36.4 (06 Feb 2021 20:05), Max: 36.8 (06 Feb 2021 18:27)  T(F): 97.6 (06 Feb 2021 20:05), Max: 98.2 (06 Feb 2021 18:27)  HR: 73 (06 Feb 2021 20:05) (69 - 83)  BP: 149/86 (06 Feb 2021 20:05) (148/74 - 161/78)  BP(mean): --  RR: 16 (06 Feb 2021 20:05) (16 - 21)  SpO2: 94% (06 Feb 2021 20:05) (87% - 96%)      I&O's Detail        LABS:                        13.3   5.15  )-----------( 143      ( 06 Feb 2021 14:46 )             39.0     02-06    138  |  102  |  22  ----------------------------<  147<H>  4.0   |  26  |  1.01    Ca    8.3<L>      06 Feb 2021 14:46    TPro  6.8  /  Alb  3.7  /  TBili  0.4  /  DBili  x   /  AST  24  /  ALT  41  /  AlkPhos  50  02-06      CARDIAC MARKERS ( 06 Feb 2021 14:46 )  <.017 ng/mL / x     / x     / x     / x          CAPILLARY BLOOD GLUCOSE        PT/INR - ( 06 Feb 2021 14:46 )   PT: 12.7 sec;   INR: 1.05 ratio         PTT - ( 06 Feb 2021 14:46 )  PTT:26.6 sec    CULTURES:      Physical Examination:    General: No acute distress.  Alert, oriented, interactive, nonfocal    HEENT: Pupils equal, reactive to light.  Symmetric.    PULM: Clear to auscultation bilaterally, diffusely wheezing    CVS: Regular rate and rhythm, no murmurs, rubs, or gallops    ABD: Soft, nondistended, nontender, normoactive bowel sounds, no masses    EXT: No edema, nontender    SKIN: Warm and well perfused, no rashes noted.    Neuro: Alert and awake, follows command, move all four extremities    A/P:  75M with PMH of COPD not on home O2, Lung cancer , PNA p/w SOB and a/w possible COPD exacerbation.    #Acute on chronic resp failure 2/2 PNA vs. COPD exacerbation  -CTA showed new RUL opacity, will start CTX for PNA  -Will send sputum culture  -Albuterol q6 for wheezing  -Prednisone 40mg daily for COPD exacerbation  -Keep O2 sat between 88 to 92%, avoid over oxygenation  -Monitor O2 sat  -Continue rest of care per primary team

## 2021-02-06 NOTE — H&P ADULT - NSHPPHYSICALEXAM_GEN_ALL_CORE
T(C): 36.7 (02-06-21 @ 14:05), Max: 36.7 (02-06-21 @ 14:05)  HR: 73 (02-06-21 @ 16:37) (69 - 83)  BP: 153/75 (02-06-21 @ 16:37) (151/89 - 161/78)  RR: 20 (02-06-21 @ 16:37) (17 - 21)  SpO2: 89% (02-06-21 @ 16:37) (87% - 96%)  Wt(kg): --Vital Signs Last 24 Hrs  T(C): 36.7 (06 Feb 2021 14:05), Max: 36.7 (06 Feb 2021 14:05)  T(F): 98.1 (06 Feb 2021 14:05), Max: 98.1 (06 Feb 2021 14:05)  HR: 73 (06 Feb 2021 16:37) (69 - 83)  BP: 153/75 (06 Feb 2021 16:37) (151/89 - 161/78)  BP(mean): --  RR: 20 (06 Feb 2021 16:37) (17 - 21)  SpO2: 89% (06 Feb 2021 16:37) (87% - 96%)    PHYSICAL EXAM:  GENERAL: NAD, well-groomed, well-developed  HEAD:  Atraumatic, Normocephalic  EYES: EOMI, PERRLA, conjunctiva and sclera clear  ENMT: No tonsillar erythema, exudates, or enlargement; Moist mucous membranes, Good dentition, No lesions  NECK: Supple, No JVD, Normal thyroid  NERVOUS SYSTEM:  Alert & Oriented X3, Good concentration; Motor Strength 5/5 B/L upper and lower extremities; DTRs 2+ intact and symmetric  CHEST/LUNG: bilateral diffuse wheezes, no crackles   HEART: Regular rate and rhythm; No murmurs, rubs, or gallops  ABDOMEN: Soft, Nontender, Nondistended; Bowel sounds present  EXTREMITIES:  2+ Peripheral Pulses, No clubbing, cyanosis, or edema  LYMPH: No lymphadenopathy noted  SKIN: No rashes or lesions

## 2021-02-06 NOTE — ED ADULT NURSE NOTE - OBJECTIVE STATEMENT
75 yr old male c/o shortness of breath and hemoptysis. Pt states he has been having difficulty breathing and bloody sputum with back pain for 5 days. PMHX: COPD, Lung CA, HTN. Pt denies chest pain, fever, chills, sick contact.

## 2021-02-06 NOTE — H&P ADULT - NSICDXPASTMEDICALHX_GEN_ALL_CORE_FT
PAST MEDICAL HISTORY:  COPD, mild     Hypertension, unspecified type     Multifocal pneumonia     Small cell carcinoma of lung

## 2021-02-06 NOTE — ED PROVIDER NOTE - CARE PLAN
Principal Discharge DX:	Hemoptysis  Secondary Diagnosis:	Shortness of breath   Principal Discharge DX:	Pneumonia of right upper lobe due to infectious organism  Secondary Diagnosis:	Shortness of breath  Secondary Diagnosis:	Hemoptysis   Principal Discharge DX:	HCAP (healthcare-associated pneumonia)  Secondary Diagnosis:	Shortness of breath  Secondary Diagnosis:	Hemoptysis  Secondary Diagnosis:	Hypoxia

## 2021-02-06 NOTE — H&P ADULT - NSHPLABSRESULTS_GEN_ALL_CORE
LABS:             13.3   5.15  )-----------( 143      ( 06 Feb 2021 14:46 )             39.0     02-06  138  |  102  |  22  ----------------------------<  147<H>  4.0   |  26  |  1.01    Ca    8.3<L>      06 Feb 2021 14:46    TPro  6.8  /  Alb  3.7  /  TBili  0.4  /  DBili  x   /  AST  24  /  ALT  41  /  AlkPhos  50  02-06    PT/INR - ( 06 Feb 2021 14:46 )   PT: 12.7 sec;   INR: 1.05 ratio      PTT - ( 06 Feb 2021 14:46 )  PTT:26.6 sec    CAPILLARY BLOOD GLUCOSE    RADIOLOGY & ADDITIONAL TESTS:    Consultant(s) Notes Reviewed:  [x ] YES  [ ] NO  Care Discussed with Consultants/Other Providers [ x] YES  [ ] NO  Imaging Personally Reviewed:  [ ] YES  [ ] NO

## 2021-02-06 NOTE — ED PROVIDER NOTE - NSFOLLOWUPINSTRUCTIONS_ED_ALL_ED_FT
Follow up with your primary care physician within 2-3 days. Take the copy of your test results you were given in the emergency room for your doctor to review.     Please fill the prescription for the antibiotics and take as directed.  Please finish the entire course of medication as prescribed.  If you have any belly pain after the antibiotics, yogurt has been shown to help with this.  Do not use any alcohol or grapefruit juice with any antibiotics.    Stay hydrated    Return to the ER if your symptoms worsen or for any other medical emergencies  ************    Pneumonia    Pneumonia is an infection of the lungs. Pneumonia may be caused by bacteria, viruses, or funguses. Symptoms include coughing, fever, chest pain when breathing deeply or coughing, shortness of breath, fatigue, or muscle aches. Pneumonia can be diagnosed with a medical history and physical exam, as well as other tests which may include a chest X-ray. If you were prescribed an antibiotic medicine, take it as told by your health care provider and do not stop taking the antibiotic even if you start to feel better. Do not use tobacco products, including cigarettes, chewing tobacco, and e-cigarettes.    SEEK IMMEDIATE MEDICAL CARE IF YOU HAVE ANY OF THE FOLLOWING SYMPTOMS: worsening shortness of breath, worsening chest pain, coughing up blood, change in mental status, lightheadedness/dizziness.

## 2021-02-06 NOTE — H&P ADULT - ASSESSMENT
75M PMH Lung cancer, COPD not on home O2 presents from home for shortness of breath found to have suspected COPD exacerbation.    COPD exacerbation  Acute hypoxic respiratory failure (86% on ambulation)  - prednisone 40 mg daily  - albuterol HFA  - monitor O2 saturation  - patient has schedule MR on Monday AM so would like to go home if possible tomorrow    Lung cancer  - follow up outpatient with [facility]  - on immunotherapy    Hypertension  - cont amlodipine    DVT PPx  - lovenox    CAPRINI SCORE [CLOT]    AGE RELATED RISK FACTORS                                                       MOBILITY RELATED FACTORS  [ ] Age 41-60 years                                            (1 Point)                  [ ] Bed rest                                                        (1 Point)  [X ] Age: 61-74 years                                           (2 Points)                 [ ] Plaster cast                                                   (2 Points)  [ ] Age= 75 years                                              (3 Points)                 [ ] Bed bound for more than 72 hours                 (2 Points)    DISEASE RELATED RISK FACTORS                                               GENDER SPECIFIC FACTORS  [ ] Edema in the lower extremities                       (1 Point)                  [ ] Pregnancy                                                     (1 Point)  [ ] Varicose veins                                               (1 Point)                  [ ] Post-partum < 6 weeks                                   (1 Point)             [ ] BMI > 25 Kg/m2                                            (1 Point)                  [ ] Hormonal therapy  or oral contraception          (1 Point)                 [ ] Sepsis (in the previous month)                        (1 Point)                  [ ] History of pregnancy complications                 (1 point)  [ ] Pneumonia or serious lung disease                                               [ ] Unexplained or recurrent                     (1 Point)           (in the previous month)                               (1 Point)  [X ] Abnormal pulmonary function test                     (1 Point)                 SURGERY RELATED RISK FACTORS  [ ] Acute myocardial infarction                              (1 Point)                 [ ]  Section                                             (1 Point)  [ ] Congestive heart failure (in the previous month)  (1 Point)               [ ] Minor surgery                                                  (1 Point)   [ ] Inflammatory bowel disease                             (1 Point)                 [ ] Arthroscopic surgery                                        (2 Points)  [ ] Central venous access                                      (2 Points)                [ ] General surgery lasting more than 45 minutes   (2 Points)       [ ] Stroke (in the previous month)                          (5 Points)               [ ] Elective arthroplasty                                         (5 Points)                                                                                                                                               HEMATOLOGY RELATED FACTORS                                                 TRAUMA RELATED RISK FACTORS  [ ] Prior episodes of VTE                                     (3 Points)                 [ ] Fracture of the hip, pelvis, or leg                       (5 Points)  [ ] Positive family history for VTE                         (3 Points)                 [ ] Acute spinal cord injury (in the previous month)  (5 Points)  [ ] Prothrombin 24738 A                                     (3 Points)                 [ ] Paralysis  (less than 1 month)                             (5 Points)  [ ] Factor V Leiden                                             (3 Points)                  [ ] Multiple Trauma within 1 month                        (5 Points)  [ ] Lupus anticoagulants                                     (3 Points)                                                           [ ] Anticardiolipin antibodies                               (3 Points)                                                       [ ] High homocysteine in the blood                      (3 Points)                                             [ ] Other congenital or acquired thrombophilia      (3 Points)                                                [ ] Heparin induced thrombocytopenia                  (3 Points)                                          Total Score [      3    ]

## 2021-02-07 ENCOUNTER — TRANSCRIPTION ENCOUNTER (OUTPATIENT)
Age: 76
End: 2021-02-07

## 2021-02-07 VITALS — OXYGEN SATURATION: 94 %

## 2021-02-07 LAB
ANION GAP SERPL CALC-SCNC: 12 MMOL/L — SIGNIFICANT CHANGE UP (ref 5–17)
BASOPHILS # BLD AUTO: 0.02 K/UL — SIGNIFICANT CHANGE UP (ref 0–0.2)
BASOPHILS NFR BLD AUTO: 0.3 % — SIGNIFICANT CHANGE UP (ref 0–2)
BUN SERPL-MCNC: 19 MG/DL — SIGNIFICANT CHANGE UP (ref 7–23)
CALCIUM SERPL-MCNC: 8.8 MG/DL — SIGNIFICANT CHANGE UP (ref 8.4–10.5)
CHLORIDE SERPL-SCNC: 103 MMOL/L — SIGNIFICANT CHANGE UP (ref 96–108)
CO2 SERPL-SCNC: 25 MMOL/L — SIGNIFICANT CHANGE UP (ref 22–31)
CREAT SERPL-MCNC: 0.73 MG/DL — SIGNIFICANT CHANGE UP (ref 0.5–1.3)
EOSINOPHIL # BLD AUTO: 0.01 K/UL — SIGNIFICANT CHANGE UP (ref 0–0.5)
EOSINOPHIL NFR BLD AUTO: 0.2 % — SIGNIFICANT CHANGE UP (ref 0–6)
GLUCOSE SERPL-MCNC: 119 MG/DL — HIGH (ref 70–99)
HCT VFR BLD CALC: 40.5 % — SIGNIFICANT CHANGE UP (ref 39–50)
HGB BLD-MCNC: 13.8 G/DL — SIGNIFICANT CHANGE UP (ref 13–17)
IMM GRANULOCYTES NFR BLD AUTO: 0.7 % — SIGNIFICANT CHANGE UP (ref 0–1.5)
LYMPHOCYTES # BLD AUTO: 1.01 K/UL — SIGNIFICANT CHANGE UP (ref 1–3.3)
LYMPHOCYTES # BLD AUTO: 16.9 % — SIGNIFICANT CHANGE UP (ref 13–44)
MCHC RBC-ENTMCNC: 30.8 PG — SIGNIFICANT CHANGE UP (ref 27–34)
MCHC RBC-ENTMCNC: 34.1 GM/DL — SIGNIFICANT CHANGE UP (ref 32–36)
MCV RBC AUTO: 90.4 FL — SIGNIFICANT CHANGE UP (ref 80–100)
MONOCYTES # BLD AUTO: 0.38 K/UL — SIGNIFICANT CHANGE UP (ref 0–0.9)
MONOCYTES NFR BLD AUTO: 6.4 % — SIGNIFICANT CHANGE UP (ref 2–14)
NEUTROPHILS # BLD AUTO: 4.51 K/UL — SIGNIFICANT CHANGE UP (ref 1.8–7.4)
NEUTROPHILS NFR BLD AUTO: 75.5 % — SIGNIFICANT CHANGE UP (ref 43–77)
NRBC # BLD: 0 /100 WBCS — SIGNIFICANT CHANGE UP (ref 0–0)
PLATELET # BLD AUTO: 147 K/UL — LOW (ref 150–400)
POTASSIUM SERPL-MCNC: 3.8 MMOL/L — SIGNIFICANT CHANGE UP (ref 3.5–5.3)
POTASSIUM SERPL-SCNC: 3.8 MMOL/L — SIGNIFICANT CHANGE UP (ref 3.5–5.3)
PROCALCITONIN SERPL-MCNC: 0.03 NG/ML — SIGNIFICANT CHANGE UP
RBC # BLD: 4.48 M/UL — SIGNIFICANT CHANGE UP (ref 4.2–5.8)
RBC # FLD: 13 % — SIGNIFICANT CHANGE UP (ref 10.3–14.5)
SARS-COV-2 IGG SERPL QL IA: NEGATIVE — SIGNIFICANT CHANGE UP
SARS-COV-2 IGM SERPL IA-ACNC: 0.07 INDEX — SIGNIFICANT CHANGE UP
SODIUM SERPL-SCNC: 140 MMOL/L — SIGNIFICANT CHANGE UP (ref 135–145)
WBC # BLD: 5.97 K/UL — SIGNIFICANT CHANGE UP (ref 3.8–10.5)
WBC # FLD AUTO: 5.97 K/UL — SIGNIFICANT CHANGE UP (ref 3.8–10.5)

## 2021-02-07 PROCEDURE — 36000 PLACE NEEDLE IN VEIN: CPT

## 2021-02-07 PROCEDURE — 94640 AIRWAY INHALATION TREATMENT: CPT

## 2021-02-07 PROCEDURE — 80053 COMPREHEN METABOLIC PANEL: CPT

## 2021-02-07 PROCEDURE — 85730 THROMBOPLASTIN TIME PARTIAL: CPT

## 2021-02-07 PROCEDURE — 83605 ASSAY OF LACTIC ACID: CPT

## 2021-02-07 PROCEDURE — 84484 ASSAY OF TROPONIN QUANT: CPT

## 2021-02-07 PROCEDURE — 86769 SARS-COV-2 COVID-19 ANTIBODY: CPT

## 2021-02-07 PROCEDURE — 87040 BLOOD CULTURE FOR BACTERIA: CPT

## 2021-02-07 PROCEDURE — 85610 PROTHROMBIN TIME: CPT

## 2021-02-07 PROCEDURE — 83880 ASSAY OF NATRIURETIC PEPTIDE: CPT

## 2021-02-07 PROCEDURE — 71045 X-RAY EXAM CHEST 1 VIEW: CPT

## 2021-02-07 PROCEDURE — 36415 COLL VENOUS BLD VENIPUNCTURE: CPT

## 2021-02-07 PROCEDURE — G0378: CPT

## 2021-02-07 PROCEDURE — 71275 CT ANGIOGRAPHY CHEST: CPT

## 2021-02-07 PROCEDURE — 99239 HOSP IP/OBS DSCHRG MGMT >30: CPT

## 2021-02-07 PROCEDURE — 99285 EMERGENCY DEPT VISIT HI MDM: CPT | Mod: 25

## 2021-02-07 PROCEDURE — 85025 COMPLETE CBC W/AUTO DIFF WBC: CPT

## 2021-02-07 PROCEDURE — U0005: CPT

## 2021-02-07 PROCEDURE — U0003: CPT

## 2021-02-07 PROCEDURE — 86480 TB TEST CELL IMMUN MEASURE: CPT

## 2021-02-07 PROCEDURE — 80048 BASIC METABOLIC PNL TOTAL CA: CPT

## 2021-02-07 PROCEDURE — 84145 PROCALCITONIN (PCT): CPT

## 2021-02-07 PROCEDURE — 93005 ELECTROCARDIOGRAM TRACING: CPT

## 2021-02-07 RX ORDER — CEFPODOXIME PROXETIL 100 MG
1 TABLET ORAL
Qty: 10 | Refills: 0
Start: 2021-02-07 | End: 2021-02-11

## 2021-02-07 RX ADMIN — AMLODIPINE BESYLATE 10 MILLIGRAM(S): 2.5 TABLET ORAL at 05:46

## 2021-02-07 RX ADMIN — Medication 40 MILLIGRAM(S): at 05:46

## 2021-02-07 RX ADMIN — BUDESONIDE AND FORMOTEROL FUMARATE DIHYDRATE 2 PUFF(S): 160; 4.5 AEROSOL RESPIRATORY (INHALATION) at 09:28

## 2021-02-07 RX ADMIN — Medication 1 TABLET(S): at 00:25

## 2021-02-07 NOTE — DISCHARGE NOTE PROVIDER - CARE PROVIDER_API CALL
Casey Zavala  INTERNAL MEDICINE  [address] [facility] [address]  [place] [address]  Phone: [phone]  Fax: [phone]  Follow Up Time:

## 2021-02-07 NOTE — PROGRESS NOTE ADULT - SUBJECTIVE AND OBJECTIVE BOX
Patient is a 75y old  Male who presents with a chief complaint of shortness of breath (06 Feb 2021 18:09)      Patient seen and examined at bedside. overnight requested to start antibiotics.     ALLERGIES:  amoxicillin (Hives)    MEDICATIONS  (STANDING):  ALBUTerol    90 MICROgram(s) HFA Inhaler 1 Puff(s) Inhalation every 6 hours  amLODIPine   Tablet 10 milliGRAM(s) Oral daily  budesonide 160 MICROgram(s)/formoterol 4.5 MICROgram(s) Inhaler 2 Puff(s) Inhalation two times a day  cefTRIAXone   IVPB 1000 milliGRAM(s) IV Intermittent every 24 hours  enoxaparin Injectable 40 milliGRAM(s) SubCutaneous daily  methadone    Tablet 15 milliGRAM(s) Oral daily  predniSONE   Tablet 40 milliGRAM(s) Oral daily    MEDICATIONS  (PRN):  acetaminophen   Tablet .. 650 milliGRAM(s) Oral every 6 hours PRN Mild Pain (1 - 3)  oxycodone    5 mG/acetaminophen 325 mG 1 Tablet(s) Oral every 6 hours PRN Moderate Pain (4 - 6)    Vital Signs Last 24 Hrs  T(F): 97.5 (07 Feb 2021 05:40), Max: 98.2 (06 Feb 2021 18:27)  HR: 70 (07 Feb 2021 05:40) (69 - 83)  BP: 140/88 (07 Feb 2021 05:40) (140/88 - 161/78)  RR: 15 (07 Feb 2021 05:40) (15 - 21)  SpO2: 94% (07 Feb 2021 05:40) (87% - 96%)  I&O's Summary    BMI (kg/m2): 27.7 (02-06-21 @ 18:27)  PHYSICAL EXAM:  General: NAD, A/O x 3  ENT: MMM, no scleral icterus  Neck: Supple, No JVD  Lungs: Clear to auscultation bilaterally, no wheezes, rales, rhonchi  Cardio: RRR, S1/S2, No murmurs  Abdomen: Soft, Nontender, Nondistended; Bowel sounds present  Extremities: No calf tenderness, No pitting edema    LABS:                        13.3   5.15  )-----------( 143      ( 06 Feb 2021 14:46 )             39.0       02-06    138  |  102  |  22  ----------------------------<  147  4.0   |  26  |  1.01    Ca    8.3      06 Feb 2021 14:46    TPro  6.8  /  Alb  3.7  /  TBili  0.4  /  DBili  x   /  AST  24  /  ALT  41  /  AlkPhos  50  02-06     eGFR if Non African American: 73 mL/min/1.73M2 (02-06-21 @ 14:46)  eGFR if : 85 mL/min/1.73M2 (02-06-21 @ 14:46)    PT/INR - ( 06 Feb 2021 14:46 )   PT: 12.7 sec;   INR: 1.05 ratio         PTT - ( 06 Feb 2021 14:46 )  PTT:26.6 sec   Lactate, Blood: 1.6 mmol/L (02-06 @ 14:46)    CARDIAC MARKERS ( 06 Feb 2021 14:46 )  <.017 ng/mL / x     / x     / x     / x                                  RADIOLOGY & ADDITIONAL TESTS:    Care Discussed with Consultants/Other Providers:    Patient is a 75y old  Male who presents with a chief complaint of shortness of breath (06 Feb 2021 18:09)    Patient seen and examined at bedside. overnight requested to start antibiotics. This AM very comfortable on RA, O2 sat 92-94%, cough improved. denies headache, fever, chills, cp, sob, n/v, abd pain      ALLERGIES:  amoxicillin (Hives)    MEDICATIONS  (STANDING):  ALBUTerol    90 MICROgram(s) HFA Inhaler 1 Puff(s) Inhalation every 6 hours  amLODIPine   Tablet 10 milliGRAM(s) Oral daily  budesonide 160 MICROgram(s)/formoterol 4.5 MICROgram(s) Inhaler 2 Puff(s) Inhalation two times a day  cefTRIAXone   IVPB 1000 milliGRAM(s) IV Intermittent every 24 hours  enoxaparin Injectable 40 milliGRAM(s) SubCutaneous daily  methadone    Tablet 15 milliGRAM(s) Oral daily  predniSONE   Tablet 40 milliGRAM(s) Oral daily    MEDICATIONS  (PRN):  acetaminophen   Tablet .. 650 milliGRAM(s) Oral every 6 hours PRN Mild Pain (1 - 3)  oxycodone    5 mG/acetaminophen 325 mG 1 Tablet(s) Oral every 6 hours PRN Moderate Pain (4 - 6)    Vital Signs Last 24 Hrs  T(F): 97.5 (07 Feb 2021 05:40), Max: 98.2 (06 Feb 2021 18:27)  HR: 70 (07 Feb 2021 05:40) (69 - 83)  BP: 140/88 (07 Feb 2021 05:40) (140/88 - 161/78)  RR: 15 (07 Feb 2021 05:40) (15 - 21)  SpO2: 94% (07 Feb 2021 05:40) (87% - 96%)  I&O's Summary    BMI (kg/m2): 27.7 (02-06-21 @ 18:27)  PHYSICAL EXAM:  General: NAD, A/O x 3  ENT: MMM, no scleral icterus  Neck: Supple, No JVD  Lungs: Respirations unlabored. Clear to auscultation bilaterally, no wheezes, rales, rhonchi  Cardio: RRR, S1/S2, No murmurs  Abdomen: Soft, Nontender, Nondistended; Bowel sounds present  Extremities: No calf tenderness, No pitting edema b/l    LABS:                        13.8   5.97  )-----------( 147      ( 07 Feb 2021 07:30 )             40.5     02-07    140  |  103  |  19  ----------------------------<  119<H>  3.8   |  25  |  0.73    Ca    8.8      07 Feb 2021 07:30    TPro  6.8  /  Alb  3.7  /  TBili  0.4  /  DBili  x   /  AST  24  /  ALT  41  /  AlkPhos  50  02-06    PT/INR - ( 06 Feb 2021 14:46 )   PT: 12.7 sec;   INR: 1.05 ratio         PTT - ( 06 Feb 2021 14:46 )  PTT:26.6 sec    CAPILLARY BLOOD GLUCOSE    RADIOLOGY & ADDITIONAL TESTS:    < from: CT Angio Chest w/ IV Cont (02.06.21 @ 15:36) >  IMPRESSION:  Limited evaluation for segmental and subsegmental pulmonary embolism. No main, left, right, or lobar pulmonary embolism.    Subtle posterior right upper lobe patchy opacity is new from the prior study. Reduced but not resolved other bilateral mid to lower lung opacities.    2.1 cm prevascular lymph node previously measured 1.2 cm. and is indeterminate    < end of copied text >  < from: Xray Chest 1 View AP/PA (02.06.21 @ 14:37) >  COMMENT:  There are no vascular or support catheters within the image.    Increased reticular markings are seen throughout the chest consistent with chronic interstitial lung disease and/or COPD.    Previously seen right basilar airspace consolidation has improved on this exam.  There is no pulmonary vascular congestion.  There are no pleural effusions.    The heart and mediastinal contours are within normal limits.  The trachea is midline.    The osseous structures are intact.    IMPRESSION:  No acute pulmonary pathology.        < end of copied text >    Care Discussed with Consultants/Other Providers: yes

## 2021-02-07 NOTE — DISCHARGE NOTE PROVIDER - HOSPITAL COURSE
Hospital Course  HPI:  74 y/o M PMH Lung cancer, COPD with 2L NC home O2, takes intermittently for shortness of breath.  Patient was recently admitted for pneumonia and discharged on 1/9/2021.  Patient states recently discharged on a steroid taper.  After he stopped taking steroids he began having worsening symptoms of productive cough with green/dark sputum. Denies fever, chills, nausea, vomiting, diarrhea.  No covid contacts. In ER patient with hypoxia to 86% on ambulation (06 Feb 2021 18:09)    Patient admitted to medical floors in stable condition. Received levaquin x2 doses. Symptoms consistent with COPD exacerbation, though patient requesting antibiotics. Received CFTX on 2/7 with improvement. Started on prednisone 40mg qd. Hypoxia improved, patient comfortable on room air and with ambulation, confirms he has 2L NC at home prn. Requesting discharge home as he has upcoming follow up appointment with oncology for repeat imaging. Remained afebrile and hemodynamically stable for safe discharge home with plan to complete steroid course. Patient educated about COPD exacerbation and requests antibiotics at time of discharge, and was given vantin x 4 days with recommendation to start if s/s acutely worsen.     Antibiotic / Last Day: vantin 200mg x 4 days, prednisone 40mg x 4 days    Discharging Provider: Shameka Burris DO  Contact Info: Cell [phone] - Please call with any questions or concerns.    Outpatient Provider: Dr. Zavala    Time spent: 35 minutes

## 2021-02-07 NOTE — PROGRESS NOTE ADULT - ASSESSMENT
75M PMH Lung cancer, COPD not on home O2 presents from home for shortness of breath found to have suspected COPD exacerbation.    #COPD exacerbation  #Acute hypoxic respiratory failure (86% on ambulation)  -Afebrile, hemodynamically stable  -On 2L NC at home, continue NC, maintain O2 sat >92%  -Prednisone 40 mg daily  -Albuterol HFA  -Patient has schedule MR on Monday AM so would like to go home if possible tomorrow    #Lung cancer  -Follow up outpatient with [place]  -On immunotherapy    #Hypertension  -Cont amlodipine    #DVT PPx - lovenox   76 y/o M PMH Lung cancer, COPD not on home O2 - but has 2L NC - presents from home for shortness of breath found to have suspected COPD exacerbation.    #COPD exacerbation  #Acute hypoxic respiratory failure (86% on ambulation)  -Afebrile, hemodynamically stable, clinically improved  -On 2L NC at home intermittently, continue NC prn, maintain O2 sat >92%  -Started on CFTX 2/6, received levaquin x2 doses. pt requesting abx upon discharge. procal negative, though sent after initiation of abx. pt informed s/s c/w COPD exacerbation and not bacterial infection. will send abx as prophylactic measure to take if s/s worsen upon discharge.    -Prednisone 40 mg daily  -Albuterol HFA  -Patient has schedule MR on Monday AM so would like to go home today    #Lung cancer  -Follow up outpatient with [place]  -On immunotherapy    #Hypertension  -Cont amlodipine    #DVT PPx - lovenox    Dispo - Anticipate  74 y/o M PMH Lung cancer, COPD not on home O2 - but has 2L NC - presents from home for shortness of breath found to have suspected COPD exacerbation.    #COPD exacerbation  #Acute hypoxic respiratory failure (86% on ambulation)  -Afebrile, hemodynamically stable, clinically improved  -On 2L NC at home intermittently, continue NC prn, maintain O2 sat >92%  -Started on CFTX 2/6, received levaquin x2 doses. pt requesting abx upon discharge. procal negative, though sent after initiation of abx. pt informed s/s c/w COPD exacerbation and not bacterial infection. will send abx as prophylactic measure to take if s/s worsen upon discharge.    -Prednisone 40 mg daily  -Albuterol HFA  -Patient has schedule MR on Monday AM so would like to go home today    #Lung cancer  -Follow up outpatient with [place]  -On immunotherapy    #Hypertension  -Cont amlodipine    #DVT PPx - lovenox    Dispo - Anticipate discharge home today

## 2021-02-07 NOTE — DISCHARGE NOTE PROVIDER - NSDCCPCAREPLAN_GEN_ALL_CORE_FT
PRINCIPAL DISCHARGE DIAGNOSIS  Diagnosis: COPD exacerbation  Assessment and Plan of Treatment: You were diagnosed with COPD exacerbation.   Complete steroid course for 5 days and follow up with your primary care provider and pulmonologist      SECONDARY DISCHARGE DIAGNOSES  Diagnosis: Hypoxia  Assessment and Plan of Treatment:     Diagnosis: Hemoptysis  Assessment and Plan of Treatment:     Diagnosis: Shortness of breath  Assessment and Plan of Treatment:

## 2021-02-07 NOTE — DISCHARGE NOTE PROVIDER - NSDCMRMEDTOKEN_GEN_ALL_CORE_FT
Albuterol (Eqv-ProAir HFA) 90 mcg/inh inhalation aerosol: 2 puff(s) inhaled every 6 hours  amLODIPine 10 mg oral tablet: 1 tab(s) orally once a day  cefpodoxime 200 mg oral tablet: 1 tab(s) orally 2 times a day   methadone 10 mg oral tablet: 1.5 tab(s) orally once a day  predniSONE 20 mg oral tablet: 2 tab(s) orally once a day  Symbicort 160 mcg-4.5 mcg/inh inhalation aerosol: 2 puff(s) inhaled 2 times a day

## 2021-02-07 NOTE — DISCHARGE NOTE PROVIDER - NSDCFUSCHEDAPPT_GEN_ALL_CORE_FT
SRAVANTHI, JAN H ; 02/08/2021 ; [facility]  SRAVANTHI, JAN H ; 02/08/2021 ; [facility]  SRAVANTHI, JAN H ; 02/18/2021 ; [facility] [address]  SRAVANTHI, JAN H ; 02/18/2021 ; \[facility]\"" [facility] [address]

## 2021-02-07 NOTE — DISCHARGE NOTE NURSING/CASE MANAGEMENT/SOCIAL WORK - PATIENT PORTAL LINK FT
You can access the FollowMyHealth Patient Portal offered by [facility] by registering at the following website: [email or web address]. By joining [employer]’s FollowMyHealth portal, you will also be able to view your health information using other applications (apps) compatible with our system.

## 2021-02-08 ENCOUNTER — NON-APPOINTMENT (OUTPATIENT)
Age: 76
End: 2021-02-08

## 2021-02-08 ENCOUNTER — APPOINTMENT (OUTPATIENT)
Dept: MRI IMAGING | Facility: CLINIC | Age: 76
End: 2021-02-08

## 2021-02-09 LAB
GAMMA INTERFERON BACKGROUND BLD IA-ACNC: 0.03 IU/ML — SIGNIFICANT CHANGE UP
M TB IFN-G BLD-IMP: NEGATIVE — SIGNIFICANT CHANGE UP
M TB IFN-G CD4+ BCKGRND COR BLD-ACNC: -0.01 IU/ML — SIGNIFICANT CHANGE UP
M TB IFN-G CD4+CD8+ BCKGRND COR BLD-ACNC: -0.01 IU/ML — SIGNIFICANT CHANGE UP
QUANT TB PLUS MITOGEN MINUS NIL: 9.61 IU/ML — SIGNIFICANT CHANGE UP

## 2021-02-11 LAB
CULTURE RESULTS: SIGNIFICANT CHANGE UP
CULTURE RESULTS: SIGNIFICANT CHANGE UP
SPECIMEN SOURCE: SIGNIFICANT CHANGE UP
SPECIMEN SOURCE: SIGNIFICANT CHANGE UP

## 2021-02-16 RX ORDER — PREDNISONE 10 MG/1
10 TABLET ORAL
Qty: 30 | Refills: 1 | Status: ACTIVE | COMMUNITY
Start: 2020-11-24 | End: 1900-01-01

## 2021-02-18 ENCOUNTER — APPOINTMENT (OUTPATIENT)
Dept: PULMONOLOGY | Facility: CLINIC | Age: 76
End: 2021-02-18

## 2021-03-12 PROBLEM — J44.9 CHRONIC OBSTRUCTIVE PULMONARY DISEASE, UNSPECIFIED: Chronic | Status: ACTIVE | Noted: 2021-02-06

## 2021-03-25 RX ORDER — TIOTROPIUM BROMIDE INHALATION SPRAY 3.12 UG/1
2.5 SPRAY, METERED RESPIRATORY (INHALATION)
Qty: 1 | Refills: 2 | Status: ACTIVE | COMMUNITY
Start: 2020-02-03 | End: 1900-01-01

## 2021-03-26 ENCOUNTER — TRANSCRIPTION ENCOUNTER (OUTPATIENT)
Age: 76
End: 2021-03-26

## 2021-03-31 ENCOUNTER — EMERGENCY (EMERGENCY)
Facility: HOSPITAL | Age: 76
LOS: 1 days | Discharge: HOME | End: 2021-03-31
Attending: EMERGENCY MEDICINE | Admitting: EMERGENCY MEDICINE
Payer: MEDICARE

## 2021-03-31 ENCOUNTER — NON-APPOINTMENT (OUTPATIENT)
Age: 76
End: 2021-03-31

## 2021-03-31 VITALS
OXYGEN SATURATION: 96 % | RESPIRATION RATE: 15 BRPM | SYSTOLIC BLOOD PRESSURE: 141 MMHG | HEART RATE: 84 BPM | DIASTOLIC BLOOD PRESSURE: 65 MMHG

## 2021-03-31 VITALS
WEIGHT: 184.97 LBS | TEMPERATURE: 98 F | HEART RATE: 82 BPM | OXYGEN SATURATION: 94 % | RESPIRATION RATE: 20 BRPM | DIASTOLIC BLOOD PRESSURE: 108 MMHG | HEIGHT: 71 IN | SYSTOLIC BLOOD PRESSURE: 141 MMHG

## 2021-03-31 LAB
ALBUMIN SERPL ELPH-MCNC: 3.7 G/DL — SIGNIFICANT CHANGE UP (ref 3.3–5)
ALP SERPL-CCNC: 41 U/L — SIGNIFICANT CHANGE UP (ref 40–120)
ALT FLD-CCNC: 34 U/L — SIGNIFICANT CHANGE UP (ref 10–45)
ANION GAP SERPL CALC-SCNC: 11 MMOL/L — SIGNIFICANT CHANGE UP (ref 5–17)
AST SERPL-CCNC: 23 U/L — SIGNIFICANT CHANGE UP (ref 10–40)
BASOPHILS # BLD AUTO: 0.03 K/UL — SIGNIFICANT CHANGE UP (ref 0–0.2)
BASOPHILS NFR BLD AUTO: 0.4 % — SIGNIFICANT CHANGE UP (ref 0–2)
BILIRUB SERPL-MCNC: 0.6 MG/DL — SIGNIFICANT CHANGE UP (ref 0.2–1.2)
BUN SERPL-MCNC: 24 MG/DL — HIGH (ref 7–23)
CALCIUM SERPL-MCNC: 8.6 MG/DL — SIGNIFICANT CHANGE UP (ref 8.4–10.5)
CHLORIDE SERPL-SCNC: 102 MMOL/L — SIGNIFICANT CHANGE UP (ref 96–108)
CO2 SERPL-SCNC: 26 MMOL/L — SIGNIFICANT CHANGE UP (ref 22–31)
CREAT SERPL-MCNC: 1.1 MG/DL — SIGNIFICANT CHANGE UP (ref 0.5–1.3)
EOSINOPHIL # BLD AUTO: 0.01 K/UL — SIGNIFICANT CHANGE UP (ref 0–0.5)
EOSINOPHIL NFR BLD AUTO: 0.1 % — SIGNIFICANT CHANGE UP (ref 0–6)
GLUCOSE SERPL-MCNC: 162 MG/DL — HIGH (ref 70–99)
HCT VFR BLD CALC: 41 % — SIGNIFICANT CHANGE UP (ref 39–50)
HGB BLD-MCNC: 14 G/DL — SIGNIFICANT CHANGE UP (ref 13–17)
IMM GRANULOCYTES NFR BLD AUTO: 1 % — SIGNIFICANT CHANGE UP (ref 0–1.5)
LYMPHOCYTES # BLD AUTO: 0.42 K/UL — LOW (ref 1–3.3)
LYMPHOCYTES # BLD AUTO: 6.3 % — LOW (ref 13–44)
MCHC RBC-ENTMCNC: 30.7 PG — SIGNIFICANT CHANGE UP (ref 27–34)
MCHC RBC-ENTMCNC: 34.1 GM/DL — SIGNIFICANT CHANGE UP (ref 32–36)
MCV RBC AUTO: 89.9 FL — SIGNIFICANT CHANGE UP (ref 80–100)
MONOCYTES # BLD AUTO: 0.11 K/UL — SIGNIFICANT CHANGE UP (ref 0–0.9)
MONOCYTES NFR BLD AUTO: 1.6 % — LOW (ref 2–14)
NEUTROPHILS # BLD AUTO: 6.04 K/UL — SIGNIFICANT CHANGE UP (ref 1.8–7.4)
NEUTROPHILS NFR BLD AUTO: 90.6 % — HIGH (ref 43–77)
NRBC # BLD: 0 /100 WBCS — SIGNIFICANT CHANGE UP (ref 0–0)
NT-PROBNP SERPL-SCNC: 51 PG/ML — SIGNIFICANT CHANGE UP (ref 0–300)
PLATELET # BLD AUTO: 152 K/UL — SIGNIFICANT CHANGE UP (ref 150–400)
POTASSIUM SERPL-MCNC: 4.4 MMOL/L — SIGNIFICANT CHANGE UP (ref 3.5–5.3)
POTASSIUM SERPL-SCNC: 4.4 MMOL/L — SIGNIFICANT CHANGE UP (ref 3.5–5.3)
PROT SERPL-MCNC: 6.9 G/DL — SIGNIFICANT CHANGE UP (ref 6–8.3)
RBC # BLD: 4.56 M/UL — SIGNIFICANT CHANGE UP (ref 4.2–5.8)
RBC # FLD: 13.7 % — SIGNIFICANT CHANGE UP (ref 10.3–14.5)
SODIUM SERPL-SCNC: 139 MMOL/L — SIGNIFICANT CHANGE UP (ref 135–145)
TROPONIN I SERPL-MCNC: <.017 NG/ML — LOW (ref 0.02–0.06)
WBC # BLD: 6.68 K/UL — SIGNIFICANT CHANGE UP (ref 3.8–10.5)
WBC # FLD AUTO: 6.68 K/UL — SIGNIFICANT CHANGE UP (ref 3.8–10.5)

## 2021-03-31 PROCEDURE — 96374 THER/PROPH/DIAG INJ IV PUSH: CPT

## 2021-03-31 PROCEDURE — 71250 CT THORAX DX C-: CPT | Mod: 26,MG

## 2021-03-31 PROCEDURE — 94640 AIRWAY INHALATION TREATMENT: CPT

## 2021-03-31 PROCEDURE — G1004: CPT

## 2021-03-31 PROCEDURE — 80053 COMPREHEN METABOLIC PANEL: CPT

## 2021-03-31 PROCEDURE — 71250 CT THORAX DX C-: CPT

## 2021-03-31 PROCEDURE — 99285 EMERGENCY DEPT VISIT HI MDM: CPT

## 2021-03-31 PROCEDURE — 84484 ASSAY OF TROPONIN QUANT: CPT

## 2021-03-31 PROCEDURE — 93005 ELECTROCARDIOGRAM TRACING: CPT

## 2021-03-31 PROCEDURE — 36415 COLL VENOUS BLD VENIPUNCTURE: CPT

## 2021-03-31 PROCEDURE — 99284 EMERGENCY DEPT VISIT MOD MDM: CPT | Mod: 25

## 2021-03-31 PROCEDURE — 85025 COMPLETE CBC W/AUTO DIFF WBC: CPT

## 2021-03-31 PROCEDURE — 83880 ASSAY OF NATRIURETIC PEPTIDE: CPT

## 2021-03-31 PROCEDURE — 93010 ELECTROCARDIOGRAM REPORT: CPT

## 2021-03-31 RX ORDER — AZITHROMYCIN 500 MG/1
500 TABLET, FILM COATED ORAL ONCE
Refills: 0 | Status: COMPLETED | OUTPATIENT
Start: 2021-03-31 | End: 2021-03-31

## 2021-03-31 RX ORDER — ALBUTEROL 90 UG/1
8 AEROSOL, METERED ORAL ONCE
Refills: 0 | Status: COMPLETED | OUTPATIENT
Start: 2021-03-31 | End: 2021-03-31

## 2021-03-31 RX ORDER — AZITHROMYCIN 500 MG/1
1 TABLET, FILM COATED ORAL
Qty: 4 | Refills: 0
Start: 2021-03-31 | End: 2021-04-03

## 2021-03-31 RX ADMIN — ALBUTEROL 8 PUFF(S): 90 AEROSOL, METERED ORAL at 14:52

## 2021-03-31 RX ADMIN — AZITHROMYCIN 500 MILLIGRAM(S): 500 TABLET, FILM COATED ORAL at 16:11

## 2021-03-31 RX ADMIN — Medication 125 MILLIGRAM(S): at 14:00

## 2021-03-31 NOTE — ED PROVIDER NOTE - OBJECTIVE STATEMENT
75 yr old male with hx of COPD, HTN, lung small cell ca, with mets to brain presents with worsening shortness of breath for the last week, worse this morning. Pt was given Levaquin for the last 5 days, with no improvement. Pt on intermittent 2L of 02 at home. Pt denies any fever, chills, n/v/d, chest pain, abdominal pain or any other symptoms. Pt has been on prednisone 10 mg per day, which has been tapered down.

## 2021-03-31 NOTE — ED PROVIDER NOTE - OTHER FREE TEXT FOR MDM DISCUSSED CASE WITH QUESTION
Spoke to pt pulmonologist- Dr. Dodge at , reviewed ct results, recommends pt to be discharged with zpack ( less likely infectious and likely pneumonitis), and prednisone 40 mg for 5 days. Pt stable for dc and fu in his office this week.

## 2021-03-31 NOTE — ED PROVIDER NOTE - NSFOLLOWUPINSTRUCTIONS_ED_ALL_ED_FT
Take prednisone 40 mg once daily for the next 5 days   Take zpack as prescribed   Follow up with your private pulmonologist- Dr. Ddoge    Return to the ED If any worsening or persistent symptoms.         Shortness of Breath    WHAT YOU NEED TO KNOW:    Shortness of breath is a feeling that you cannot get enough air when you breathe in. You may have this feeling only during activity, or all the time. Your symptoms can range from mild to severe. Shortness of breath may be a sign of a serious health condition that needs immediate care.    DISCHARGE INSTRUCTIONS:    Seek care immediately if:   •Your signs and symptoms are the same or worse within 24 hours of treatment.       •The skin over your ribs or on your neck sinks in when you breathe.       •You feel confused or dizzy.      Contact your healthcare provider if:   •You have new or worsening symptoms.      •You have questions or concerns about your condition or care.      Medicines:    •Medicines may be used to treat the cause of your symptoms. You may need medicine to treat a bacterial infection or reduce anxiety. Other medicines may be used to open your airway, reduce swelling, or remove extra fluid. If you have a heart condition, you may need medicine to help your heart beat more strongly or regularly.      •Take your medicine as directed. Contact your healthcare provider if you think your medicine is not helping or if you have side effects. Tell him or her if you are allergic to any medicine. Keep a list of the medicines, vitamins, and herbs you take. Include the amounts, and when and why you take them. Bring the list or the pill bottles to follow-up visits. Carry your medicine list with you in case of an emergency.      Manage shortness of breath:   •Create an action plan. You and your healthcare provider can work together to create a plan for how to handle shortness of breath. The plan can include daily activities, treatment changes, and what to do if you have severe breathing problems.      •Lean forward on your elbows when you sit. This helps your lungs expand and may make it easier to breathe.      •Use pursed-lip breathing any time you feel short of breath. Breathe in through your nose and then slowly breathe out through your mouth with your lips slightly puckered. It should take you twice as long to breathe out as it did to breathe in.  Breathe in Breathe out           •Do not smoke. Nicotine and other chemicals in cigarettes and cigars can cause lung damage and make shortness of breath worse. Ask your healthcare provider for information if you currently smoke and need help to quit. E-cigarettes or smokeless tobacco still contain nicotine. Talk to your healthcare provider before you use these products.      •Reach or maintain a healthy weight. Your healthcare provider can help you create a safe weight loss plan if you are overweight.      •Exercise as directed. Exercise can help your lungs work more easily. Exercise can also help you lose weight if needed. Try to get at least 30 minutes of exercise most days of the week.      Follow up with your healthcare provider or specialist as directed: Write down your questions so you remember to ask them during your visits.

## 2021-03-31 NOTE — ED PROVIDER NOTE - CARE PLAN
Principal Discharge DX:	Shortness of breath   Principal Discharge DX:	Shortness of breath  Secondary Diagnosis:	Lung cancer

## 2021-03-31 NOTE — ED PROVIDER NOTE - CLINICAL SUMMARY MEDICAL DECISION MAKING FREE TEXT BOX
75 yr old male with hx of COPD, HTN, lung small cell ca, with mets to brain presents with worsening shortness of breath for the last week, worse this morning. Pt was given Levaquin for the last 5 days, with no improvement. Pt on intermittent 2L of 02 at home. Pt denies any fever, chills, n/v/d, chest pain, abdominal pain or any other symptoms. Pt has been on prednisone 10 mg per day, which has been tapered down.   diffuse wheeze with long expiratory wheeze. 75 yr old male with hx of COPD, HTN, lung small cell ca, with mets to brain presents with worsening shortness of breath for the last week, worse this morning. Pt was given Levaquin for the last 5 days, with no improvement. Pt on intermittent 2L of 02 at home. Pt denies any fever, chills, n/v/d, chest pain, abdominal pain or any other symptoms. Pt has been on prednisone 10 mg per day, which has been tapered down.   diffuse wheeze with long expiratory wheeze. labs and ct reviewed  Spoke to pt pulmonologist- Dr. Dodge at , reviewed ct results, recommends pt to be discharged with zpack ( less likely infectious and likely pneumonitis), and prednisone 40 mg for 5 days. Pt stable for dc and fu in his office this week.

## 2021-03-31 NOTE — ED ADULT NURSE NOTE - OBJECTIVE STATEMENT
pt came in with wife from home with c/o SOB x2 weeks, PMD prescribed levaquin x5 days but reports he is not improving. Pt with PMG of small cell lung CA mets to the brain, HTN, COPD/asthma. pt came in on 2LNC, reports he is not on O2 always at home, just uses as needed. pt now with O2 sat 97% on 2LNC on arrival to ED. pt came in with wife from home with c/o SOB x2 weeks, but worsening this morning. pt reports his PMD prescribed levaquin x5 days but reports he is not improving. Pt with PMG of small cell lung CA mets to the brain, HTN, COPD/asthma. pt came in on 2LNC, reports he is not on O2 always at home, just uses intermittently as needed. Reports he has needed it since waking up this morning. pt now with O2 sat 97% on 2LNC on arrival to ED. Pt denies any fever, chills, n/v/d, chest pain, abdominal pain or any other symptoms. Pt has been on prednisone 10 mg per day, which has been tapered down

## 2021-03-31 NOTE — ED PROVIDER NOTE - ATTENDING CONTRIBUTION TO CARE
Kelin with DAVID Montgomery. 75 yr old male with hx of COPD, HTN, lung small cell ca, with mets to brain presents with worsening shortness of breath for the last week, worse this morning. Pt was given Levaquin for the last 5 days, with no improvement. Pt on intermittent 2L of 02 at home. Pt denies any fever, chills, n/v/d, chest pain, abdominal pain or any other symptoms. Pt has been on prednisone 10 mg per day, which has been tapered down.   diffuse wheeze with long expiratory wheeze. labs and ct reviewed  Spoke to pt pulmonologist- Dr. Dodge at , reviewed ct results, recommends pt to be discharged with zpack ( less likely infectious and likely pneumonitis), and prednisone 40 mg for 5 days. Pt stable for dc and fu in his office this week.  I performed a face to face bedside interview with patient regarding history of present illness, review of symptoms and past medical history. I completed an independent physical exam.  I have discussed the patient's plan of care with Physician Assistant (PA). I agree with note as stated above, having amended the EMR as needed to reflect my findings.   This includes History of Present Illness, HIV, Past Medical/Surgical/Family/Social History, Allergies and Home Medications, Review of Systems, Physical Exam, and any Progress Notes during the time I functioned as the attending physician for this patient.

## 2021-03-31 NOTE — ED ADULT NURSE NOTE - PMH
COPD, mild    Hypertension, unspecified type    Multifocal pneumonia    Small cell carcinoma of lung

## 2021-03-31 NOTE — ED PROVIDER NOTE - CARE PROVIDER_API CALL
Casey Zavala  INTERNAL MEDICINE  [address], [address]  [place] [address]  Phone: [phone]  Fax: [phone]  Follow Up Time:

## 2021-03-31 NOTE — ED PROVIDER NOTE - PATIENT PORTAL LINK FT
You can access the FollowMyHealth Patient Portal offered by [facility] by registering at the following website: [email or web address]. By joining [facility]’s FollowMyHealth portal, you will also be able to view your health information using other applications (apps) compatible with our system.

## 2021-04-05 ENCOUNTER — APPOINTMENT (OUTPATIENT)
Dept: PULMONOLOGY | Facility: CLINIC | Age: 76
End: 2021-04-05
Payer: MEDICARE

## 2021-04-05 VITALS
OXYGEN SATURATION: 94 % | TEMPERATURE: 98.7 F | HEIGHT: 71 IN | HEART RATE: 89 BPM | DIASTOLIC BLOOD PRESSURE: 82 MMHG | SYSTOLIC BLOOD PRESSURE: 139 MMHG | BODY MASS INDEX: 25.9 KG/M2 | WEIGHT: 185 LBS

## 2021-04-05 DIAGNOSIS — R93.89 ABNORMAL FINDINGS ON DIAGNOSTIC IMAGING OF OTHER SPECIFIED BODY STRUCTURES: ICD-10-CM

## 2021-04-05 DIAGNOSIS — C34.90 MALIGNANT NEOPLASM OF UNSPECIFIED PART OF UNSPECIFIED BRONCHUS OR LUNG: ICD-10-CM

## 2021-04-05 DIAGNOSIS — C78.00 SECONDARY MALIGNANT NEOPLASM OF UNSPECIFIED LUNG: ICD-10-CM

## 2021-04-05 DIAGNOSIS — C79.31 SECONDARY MALIGNANT NEOPLASM OF BRAIN: ICD-10-CM

## 2021-04-05 DIAGNOSIS — J43.9 EMPHYSEMA, UNSPECIFIED: ICD-10-CM

## 2021-04-05 DIAGNOSIS — J45.901 CHRONIC OBSTRUCTIVE PULMONARY DISEASE WITH (ACUTE) EXACERBATION: ICD-10-CM

## 2021-04-05 DIAGNOSIS — J44.1 CHRONIC OBSTRUCTIVE PULMONARY DISEASE WITH (ACUTE) EXACERBATION: ICD-10-CM

## 2021-04-05 DIAGNOSIS — R74.8 ABNORMAL LEVELS OF OTHER SERUM ENZYMES: ICD-10-CM

## 2021-04-05 PROCEDURE — 99214 OFFICE O/P EST MOD 30 MIN: CPT

## 2021-04-05 PROCEDURE — 99072 ADDL SUPL MATRL&STAF TM PHE: CPT

## 2021-04-05 RX ORDER — LEVOFLOXACIN 500 MG/1
500 TABLET, FILM COATED ORAL DAILY
Qty: 7 | Refills: 0 | Status: DISCONTINUED | COMMUNITY
Start: 2021-01-05 | End: 2021-04-05

## 2021-04-05 RX ORDER — IPRATROPIUM BROMIDE AND ALBUTEROL SULFATE 2.5; .5 MG/3ML; MG/3ML
0.5-2.5 (3) SOLUTION RESPIRATORY (INHALATION)
Qty: 180 | Refills: 0 | Status: ACTIVE | COMMUNITY
Start: 2021-03-23

## 2021-04-05 RX ORDER — CEFUROXIME AXETIL 500 MG/1
500 TABLET ORAL
Qty: 14 | Refills: 0 | Status: DISCONTINUED | COMMUNITY
Start: 2021-01-11 | End: 2021-04-05

## 2021-04-05 RX ORDER — AZITHROMYCIN 250 MG/1
250 TABLET, FILM COATED ORAL
Qty: 4 | Refills: 0 | Status: DISCONTINUED | COMMUNITY
Start: 2021-03-31

## 2021-04-05 RX ORDER — ALBUTEROL SULFATE 90 UG/1
108 (90 BASE) INHALANT RESPIRATORY (INHALATION)
Qty: 18 | Refills: 2 | Status: ACTIVE | COMMUNITY
Start: 2021-04-05 | End: 1900-01-01

## 2021-04-05 NOTE — HISTORY OF PRESENT ILLNESS
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\par COPD severe\par Hypertension\par Admitted with increased shortness of breath for the past week\par Patient outpatient treatment with Levaquin with no real improvement\par Patient is on intermittent 2 L of O2 oxygen at home\par Then noted wheezing with a long expiratory phase\par \par Plan was to discharge patient with a Z-Emigdio\par CT chest 3/31/2021\par Severe emphysema\par Stable prevascular adenopathy\par Progressive metastatic parenchymal nodularity left upper lobe\par New masslike pleural-based parenchymal consolidation left upper lobe infectious inflammatory versus neoplastic\par COVID-19 PCR negative serum BNP 51 normal range\par Creatinine 1.10\par CBC White blood count 6.68 hemoglobin 14 hematocrit 41.0\par Platelet count 152,000 \par  \par \par Brain MRI - tx cyberknife gamma RT told pos result\par Ordered study per [facility]\par Sister with cerebral cerebellar metastases\par Dominant 1.7 x 1.3 cm ring-enhancing mass left parietal lobe\par 5 mm enhancing lesion right parietal lobe\par 9 mm enhancing lesion left cerebellum- \par \par Plan: Hospital discharge Feb 8 noted\par February 6, 2021 CT angiogram protocol patient with known metastatic lung cancer\par No reported pulmonary embolism\par Subtle posterior right upper lobe patchy opacity new from a prior study reduced but not resolved regarding of the bilateral mid to lower lung zone opacities\par 2.1 cm prevascular lymph node prior measured 1.2 cm\par Management for metastatic lung cancer is [facility] he was admitted with a COPD exacerbation\par Received 2 doses of Levaquin\par Was placed on prednisone 40 mg discharge 20 mg\par Reported in the hospitalization note hypoxemia improved including on room air and with ambulation\par As noted he is on 2 L nasal cannula at home as needed\par \par \par Posthospitalization [facility]\par Patient was admitted with significant hemoptysis\par Diagnosis with multifocal pneumonia\par \par Patient with COPD significant reactive component with airway disease\par Small cell lung cancer on treatment protocol [facility]\par Admitted to the hospital with hemoptysis\par Angiogram protocol negative for pulmonary emboli\par Prior visualized left upper lobe mass is not present\par Post treatment\par Scattered bilateral patchy opacities more pronounced right lower lobe with confluent\par \par Multifocal pneumonia\par Recommendation short-term imaging\par Left upper lobe bronchus attenuated with adjacent soft tissue thickening\par \par Patient on IV antibiotics\par Post discharge address issue of bronchoscopy might be best managed with [facility]\par Noted mediastinal adenopathy improved but there are some interval progression of the right hilum lymph nodes\par \par Noted 8 January 6, 2020 chest CT per [facility] completed with angiogram protocol was negative for pulmonary embolism moderate centrilobular emphysema pulmonary nodules no reported multifocal pneumonia with adenopathy reported [FreeTextEntry1] : Small cell metastatic lung cancer\par August scans demonstrated interval improvement\par \par COPD with an asthmatic component with incomplete but  wax  wane sxs  with slow  interval improvement\par less cough wheeze  chest  congestion\par  sputum cleared\par still pending lung  resection at [facility]\par \par Hypothyroidism on Synthroid 25 mg 5 days/week and Saturday Sunday 50 mg daily\par \par Pulmonary consultation\par 73-year-old with a chief complaint of chronic significant chest congestion associated with difficulty breathing\par He states he has wheeze that is associated with shortness of breath\par Sputum that is light green in color\par No reported hemoptysis\par No fevers chills or sweats\par No lower extremity edema\par No chest pain pleuritic chest pain exertional chest pain former smoker with a approximately 31-year tobacco history\par 's he states he had a 15-year timeframe where he was not smoking cigarettes\par He informs me he started smoking the age of 27 completed tobacco discontinuation approximate 1 month ago with a 15-year in between low and a total of 31-year pack history\par Treated at [facility] with Ventolin doxycycline and finished  5-day course of prednisone\par He still has significant symptomatology as noted above\par Occupation \par He states he had pneumonia approximately 3 years ago\par Reports childhood bronchitis\par \par \par

## 2021-04-05 NOTE — REVIEW OF SYSTEMS
[As Noted in HPI] : as noted in HPI [Reflux] : reflux [Nocturia] : nocturia [Fracture] : fracture [Back Pain] : ~T back pain [Negative] : Sleep Disorder [Fever] : no fever [Trauma] : no ~T physical trauma [Kyphoscoliosis] : no kyphoscoliosis [Myalgias] : no myalgias [Arthralgias] : no arthralgias [Raynaud] : no Raynaud's phenomenon was observed [Scleroderma] : no scleroderma [Rash] : no [unfilled] rash [Itch] : no itching [Ulcerations] : no ulcerations [Telangiectasias] : no telangiectasias [FreeTextEntry7] : History of viral hepatitis B\par  [de-identified] : Prior history of herpes zoster, shingles

## 2021-04-05 NOTE — DISCUSSION/SUMMARY
[FreeTextEntry1] : Met small cell  lung Ca\par post brain met cyber knife\par r/o chest  recurrence\par notes f/u CT CHEST [facility] Friday\par \par Post discharge multifocal pneumonia\par Post hemoptysis with almost complete resolution\par  metastatic small cell lung carcinoma-completed chemotherapy-currently on immunotherapy protocol [facility]\par Pending to start immunotherapy biologic treatment protocol as per [facility]\par  COPD- Emphysema\par post COPD flare  but not back on controller therapy -Restart\par Vitamin D insufficiency\par HCV positive with RNA NEGATIVE no indication for Consult tx\par Vitamin D supplementation over-the-counter 2000 units daily\par Hypothyroidism on  synthroid\par Symbicort and Spiriva- SAMPLE BREZTRI 2 puffs BID and Rinse\par reorder steroids- completed steroids with retx \par prn ventolin ALEXANDRO\par Addressed short-term follow-up chest CT upon return to office and/or with [facility]\par Issue of bronchoscopy addressed I will check laboratory work\par Advised patient should get Covid vaccination\par

## 2021-04-05 NOTE — PHYSICAL EXAM
[No Acute Distress] : no acute distress [No Neck Mass] : no neck mass [Normal Rate/Rhythm] : normal rate/rhythm [Normal S1, S2] : normal s1, s2 [No Murmurs] : no murmurs [Wheeze] : wheeze [No Abnormalities] : no abnormalities [Benign] : benign [Normal Gait] : normal gait [No Clubbing] : no clubbing [No Cyanosis] : no cyanosis [No Edema] : no edema [FROM] : FROM [Normal Color/ Pigmentation] : normal color/ pigmentation [Oriented x3] : oriented x3 [Normal Affect] : normal affect [General Appearance - Well Developed] : well developed [Normal Appearance] : normal appearance [Well Groomed] : well groomed [General Appearance - Well Nourished] : well nourished [No Deformities] : no deformities [General Appearance - In No Acute Distress] : no acute distress [Normal Conjunctiva] : the conjunctiva exhibited no abnormalities [Eyelids - No Xanthelasma] : the eyelids demonstrated no xanthelasmas [Normal Oropharynx] : normal oropharynx [I] : I [Neck Appearance] : the appearance of the neck was normal [Neck Cervical Mass (___cm)] : no neck mass was observed [Jugular Venous Distention Increased] : there was no jugular-venous distention [Thyroid Diffuse Enlargement] : the thyroid was not enlarged [Heart Rate And Rhythm] : heart rate and rhythm were normal [Heart Sounds] : normal S1 and S2 [Murmurs] : no murmurs present [Arterial Pulses Normal] : the arterial pulses were normal [Edema] : no peripheral edema present [Veins - Varicosity Changes] : no varicosital changes were noted in the lower extremities [Respiration, Rhythm And Depth] : normal respiratory rhythm and effort [Exaggerated Use Of Accessory Muscles For Inspiration] : no accessory muscle use [Chest Palpation] : palpation of the chest revealed no abnormalities [Lungs Percussion] : the lungs were normal to percussion [Bowel Sounds] : normal bowel sounds [Abdomen Soft] : soft [Abdomen Tenderness] : non-tender [Abdomen Mass (___ Cm)] : no abdominal mass palpated [Abnormal Walk] : normal gait [Gait - Sufficient For Exercise Testing] : the gait was sufficient for exercise testing [Nail Clubbing] : no clubbing of the fingernails [Cyanosis, Localized] : no localized cyanosis [Petechial Hemorrhages (___cm)] : no petechial hemorrhages [] : no ischemic changes [Deep Tendon Reflexes (DTR)] : deep tendon reflexes were 2+ and symmetric [Sensation] : the sensory exam was normal to light touch and pinprick [No Focal Deficits] : no focal deficits [Oriented To Time, Place, And Person] : oriented to person, place, and time [Impaired Insight] : insight and judgment were intact [Affect] : the affect was normal [FreeTextEntry1] : Bilateral prolonged expiratory phase with diffuse wheeze rhonchi with interval  improvement and no wheeze just prolonged expiratory phase

## 2021-04-05 NOTE — PROCEDURE
[FreeTextEntry1] : Chest x-ray PA lateral January 21, 2021\par Increased markings more predominant right versus left lower lobe\par No dominant pulmonary nodules masses appreciated\par No pleural effusion\par Mild calcification aortic knob\par Compared to chest x-ray following technique changes 12/20/2020 no interval change noted\par \par NIOX  74  ppb  consistent with bronchial inflammation\par PFT November 30, 2020\par Moderate obstructive ventilatory impairment\par No response to bronchodilator at FEV1\par Lung volumes are normal\par Total lung capacity 95% predicted.\par No significant air trapping with RV/TLC ratio 124% predicted.\par Severe reduction diffusion 48% predicted with a loss of functioning alveolar capillary units\par Hemoglobin 13.4\par \par CT scan completed through [facility] of lung cancer with restaging\par Scan completed on October 20, 2020\par Severe panlobular emphysema\par Spiculated left upper lobe nodule continues to elongate and decrease in size now measuring 2.0 x 1.1 cm\par Pleural thickening left costophrenic angle\par Atelectasis bases\par Mild unchanged reticulation at the lung bases compatible with developing fibrosis\par Mild peribronchial thickening\par Tracheobronchial essentially patent\par Mediastinal hilar lymph nodes stable borderline enlarged mediastinal lymph node\par Right paratracheal lymph node 1.1 cm\par Coronary artery calcification\par Impression\par Decreased size spiculated left upper lobe nodule\par Stable borderline enlarged mediastinal adenopathy that has decreased in size compared to the prior several studies\par Stable filling defect within the proximal left superior pulmonary vein\par \par Data reviewed CT chest with IV contrast August 11, 2020\par Interval resolution of the left suprahilar and anterior mediastinal lymphadenopathy\par Pretracheal lymph node 10 mm\par Subcentimeter lymph nodes AP window and paratracheal\par No supraclavicular adenopathy\par \par Severe pulmonary emphysema\par \par Spiculated nodule in 3 left upper lobe 1.9 x 1.3 cm decreased in size since the prior study went where it measured 2.3 x 1.8\par Pleural thickening\par Atelectasis left base\par Mild reticular markings with traction bronchiectasis right lung base rule out early signs of mild interstitial fibrosis\par No dominant pulmonary nodules right lung\par Mild diffuse peribronchial thickening\par Overall impression\par Interval with marked decrease in the extent of lymphadenopathy\par Residual lymphadenopathy noted\par Decrease in the size of the left upper lobe mass lesion\par Also reported a filling defect noted in the proximal left superior pulmonary vein reported 10 mm concern whether this is thrombus versus extension of the mass into the left knee.  Pulmonary vein\par CT abdomen pelvis\par We will decrease in the size of a metastatic segment 6 liver lesion measuring 1.3 cm\par Near complete resolution of the metastatic adenopathy in the teresa hepatis and precaval lymph node stations\par Prior demonstrated left adrenal mets no longer visualized\par No new evidence of new metastatic disease in the abdomen or pelvis\par Laboratory data\par Comprehensive metabolic profile unremarkable\par CBC hematocrit 33 but otherwise normal\par \par \par PFT March 13, 2020 moderate obstructive ventilatory impairment\par No bronchodilator response at FEV1\par Air trapping with RV/TLC ratio 156% of predicted.\par Diffusion 79% of predicted.\par Stable flow rates but noted significant interval improvement dating back to December 20, 2019\par \par FENO 57  ppb chest with bronchial inflammation\par \par Chest x-ray PA lateral normal cardiac size Frank[facility]  3 2020\par Dirty lungs consistent with known COPD\par Fullness left hilum\par No clear infiltrate noted\par \par X-ray PA lateral December 20, 2019\par Follow-up\par Normal cardiac size\par Mild calcification aortic knob\par COPD changes\par Some suggestion of mild interval clearing of the left lower lobe but persistent abnormality noted\par \par \par Spirometry  December 13, 2019\par Severe obstructive ventilatory impairment\par 12% response to bronchodilator at the FEV1\par Significant decline in flow rates compared to earlier data\par Bronchodilator provided albuterol via nebulizer inhalational treatment\par \par PFT Nov 13 2019\par Moderate obstructive ventilatory impairment\par No  response to bronchodilator at FEV1\par Normal lung volumes\par  Mild Moderate reduction diffusion 63% of predicted with a loss of functioning alveolocapillary units\par Compared to April 4, 2019 there is interval improvement of flow rates diffusion and stable total lung capacity\par \par EKG 4/18/2019\par NSR \par r/o old anterior wall MI \par Noted no change compared EKG 2016\par \par Blood Draw\par Increase Synthroid to 50 mcg Saturday and Sunday and continue 25 mc data review April 19, 2019\par CBC White blood count 5.15 hemoglobin 12.5 hematocrit 39.4 .8\par Platelet count 256,000\par Hemoglobin A1c 5.2 with mean plasma glucose 103\par Vitamin D 25.3 data review April 19, 2019\par CBC\par White blood count just above normal 12.76 hemoglobin 13.9 hematocrit 41.1 platelet count 291,000\par Hemoglobin A1c 5.7% with mean plasma glucose 117\par HCV weakly reactive  and pending HCV RNA qualitative test - NOTED HCV RNA is Negative\par Lipid profile\par Cholesterol 146 HDL 31 triglycerides 99 LDL 95\par PSA 0.58\par T4 free T4 TSH normal\par TSH 0.60\par Vitamin D 19.4 \par Serum electrolytes are normal\par Renal function normal\par BUN 12 creatinine 0.83\par AST 33\par ALT 48\par Alkaline phosphatase bilirubin normal \par \par  Prevnar IM   9/18/19\par High-dose flu vaccination administered November 4 2020

## 2021-04-12 RX ORDER — PREDNISONE 20 MG/1
20 TABLET ORAL DAILY
Qty: 90 | Refills: 2 | Status: ACTIVE | COMMUNITY
Start: 2021-03-31

## 2021-04-12 RX ORDER — AZITHROMYCIN 250 MG/1
250 TABLET, FILM COATED ORAL
Qty: 1 | Refills: 0 | Status: ACTIVE | COMMUNITY
Start: 2021-04-12 | End: 1900-01-01

## 2021-04-17 ENCOUNTER — EMERGENCY (EMERGENCY)
Facility: HOSPITAL | Age: 76
LOS: 1 days | Discharge: HOME | End: 2021-04-17
Attending: EMERGENCY MEDICINE | Admitting: INTERNAL MEDICINE
Payer: MEDICARE

## 2021-04-17 VITALS
TEMPERATURE: 98 F | RESPIRATION RATE: 20 BRPM | HEART RATE: 81 BPM | WEIGHT: 184.97 LBS | DIASTOLIC BLOOD PRESSURE: 71 MMHG | OXYGEN SATURATION: 99 % | SYSTOLIC BLOOD PRESSURE: 120 MMHG | HEIGHT: 71 IN

## 2021-04-17 LAB
ALBUMIN SERPL ELPH-MCNC: 3.4 G/DL — SIGNIFICANT CHANGE UP (ref 3.3–5)
ALP SERPL-CCNC: 39 U/L — LOW (ref 40–120)
ALT FLD-CCNC: 42 U/L — SIGNIFICANT CHANGE UP (ref 10–45)
ANION GAP SERPL CALC-SCNC: 6 MMOL/L — SIGNIFICANT CHANGE UP (ref 5–17)
APTT BLD: 24.4 SEC — LOW (ref 27.5–35.5)
AST SERPL-CCNC: 34 U/L — SIGNIFICANT CHANGE UP (ref 10–40)
BASOPHILS # BLD AUTO: 0.01 K/UL — SIGNIFICANT CHANGE UP (ref 0–0.2)
BASOPHILS NFR BLD AUTO: 0.1 % — SIGNIFICANT CHANGE UP (ref 0–2)
BILIRUB SERPL-MCNC: 0.7 MG/DL — SIGNIFICANT CHANGE UP (ref 0.2–1.2)
BUN SERPL-MCNC: 36 MG/DL — HIGH (ref 7–23)
CALCIUM SERPL-MCNC: 8.5 MG/DL — SIGNIFICANT CHANGE UP (ref 8.4–10.5)
CHLORIDE SERPL-SCNC: 100 MMOL/L — SIGNIFICANT CHANGE UP (ref 96–108)
CO2 BLDA-SCNC: 25 MMOL/L — SIGNIFICANT CHANGE UP (ref 22–30)
CO2 SERPL-SCNC: 27 MMOL/L — SIGNIFICANT CHANGE UP (ref 22–31)
CREAT SERPL-MCNC: 1.2 MG/DL — SIGNIFICANT CHANGE UP (ref 0.5–1.3)
EOSINOPHIL # BLD AUTO: 0.05 K/UL — SIGNIFICANT CHANGE UP (ref 0–0.5)
EOSINOPHIL NFR BLD AUTO: 0.5 % — SIGNIFICANT CHANGE UP (ref 0–6)
GAS PNL BLDA: SIGNIFICANT CHANGE UP
GLUCOSE SERPL-MCNC: 105 MG/DL — HIGH (ref 70–99)
HCT VFR BLD CALC: 39.7 % — SIGNIFICANT CHANGE UP (ref 39–50)
HGB BLD-MCNC: 13.3 G/DL — SIGNIFICANT CHANGE UP (ref 13–17)
HOROWITZ INDEX BLDA+IHG-RTO: SIGNIFICANT CHANGE UP
IMM GRANULOCYTES NFR BLD AUTO: 0.4 % — SIGNIFICANT CHANGE UP (ref 0–1.5)
INR BLD: 0.94 RATIO — SIGNIFICANT CHANGE UP (ref 0.88–1.16)
LIDOCAIN IGE QN: 151 U/L — SIGNIFICANT CHANGE UP (ref 73–393)
LYMPHOCYTES # BLD AUTO: 1.6 K/UL — SIGNIFICANT CHANGE UP (ref 1–3.3)
LYMPHOCYTES # BLD AUTO: 16.8 % — SIGNIFICANT CHANGE UP (ref 13–44)
MCHC RBC-ENTMCNC: 30.7 PG — SIGNIFICANT CHANGE UP (ref 27–34)
MCHC RBC-ENTMCNC: 33.5 GM/DL — SIGNIFICANT CHANGE UP (ref 32–36)
MCV RBC AUTO: 91.7 FL — SIGNIFICANT CHANGE UP (ref 80–100)
MONOCYTES # BLD AUTO: 0.09 K/UL — SIGNIFICANT CHANGE UP (ref 0–0.9)
MONOCYTES NFR BLD AUTO: 0.9 % — LOW (ref 2–14)
NEUTROPHILS # BLD AUTO: 7.72 K/UL — HIGH (ref 1.8–7.4)
NEUTROPHILS NFR BLD AUTO: 81.3 % — HIGH (ref 43–77)
NRBC # BLD: 0 /100 WBCS — SIGNIFICANT CHANGE UP (ref 0–0)
PCO2 BLDA: 42 MMHG — SIGNIFICANT CHANGE UP (ref 32–46)
PH BLDA: 7.37 — SIGNIFICANT CHANGE UP (ref 7.35–7.45)
PLATELET # BLD AUTO: 134 K/UL — LOW (ref 150–400)
PO2 BLDA: 95 MMHG — SIGNIFICANT CHANGE UP (ref 74–108)
POTASSIUM SERPL-MCNC: 4.4 MMOL/L — SIGNIFICANT CHANGE UP (ref 3.5–5.3)
POTASSIUM SERPL-SCNC: 4.4 MMOL/L — SIGNIFICANT CHANGE UP (ref 3.5–5.3)
PROT SERPL-MCNC: 6.5 G/DL — SIGNIFICANT CHANGE UP (ref 6–8.3)
PROTHROM AB SERPL-ACNC: 11.4 SEC — SIGNIFICANT CHANGE UP (ref 10.6–13.6)
RBC # BLD: 4.33 M/UL — SIGNIFICANT CHANGE UP (ref 4.2–5.8)
RBC # FLD: 13.3 % — SIGNIFICANT CHANGE UP (ref 10.3–14.5)
SAO2 % BLDA: 96 % — SIGNIFICANT CHANGE UP (ref 92–96)
SARS-COV-2 RNA SPEC QL NAA+PROBE: SIGNIFICANT CHANGE UP
SODIUM SERPL-SCNC: 133 MMOL/L — LOW (ref 135–145)
TROPONIN I SERPL-MCNC: <.017 NG/ML — LOW (ref 0.02–0.06)
WBC # BLD: 9.51 K/UL — SIGNIFICANT CHANGE UP (ref 3.8–10.5)
WBC # FLD AUTO: 9.51 K/UL — SIGNIFICANT CHANGE UP (ref 3.8–10.5)

## 2021-04-17 PROCEDURE — 71045 X-RAY EXAM CHEST 1 VIEW: CPT | Mod: 26

## 2021-04-17 PROCEDURE — 99220: CPT

## 2021-04-17 PROCEDURE — 99497 ADVNCD CARE PLAN 30 MIN: CPT | Mod: 25

## 2021-04-17 PROCEDURE — 71275 CT ANGIOGRAPHY CHEST: CPT | Mod: 26,MA

## 2021-04-17 PROCEDURE — 93010 ELECTROCARDIOGRAM REPORT: CPT

## 2021-04-17 PROCEDURE — 99285 EMERGENCY DEPT VISIT HI MDM: CPT

## 2021-04-17 RX ORDER — MORPHINE SULFATE 50 MG/1
4 CAPSULE, EXTENDED RELEASE ORAL ONCE
Refills: 0 | Status: DISCONTINUED | OUTPATIENT
Start: 2021-04-17 | End: 2021-04-17

## 2021-04-17 RX ORDER — TIOTROPIUM BROMIDE 18 UG/1
1 CAPSULE ORAL; RESPIRATORY (INHALATION) DAILY
Refills: 0 | Status: DISCONTINUED | OUTPATIENT
Start: 2021-04-17 | End: 2021-04-20

## 2021-04-17 RX ORDER — OXYCODONE HYDROCHLORIDE 5 MG/1
5 TABLET ORAL EVERY 6 HOURS
Refills: 0 | Status: DISCONTINUED | OUTPATIENT
Start: 2021-04-17 | End: 2021-04-18

## 2021-04-17 RX ORDER — ONDANSETRON 8 MG/1
1 TABLET, FILM COATED ORAL
Qty: 0 | Refills: 0 | DISCHARGE

## 2021-04-17 RX ORDER — IPRATROPIUM/ALBUTEROL SULFATE 18-103MCG
3 AEROSOL WITH ADAPTER (GRAM) INHALATION ONCE
Refills: 0 | Status: DISCONTINUED | OUTPATIENT
Start: 2021-04-17 | End: 2021-04-17

## 2021-04-17 RX ORDER — METHADONE HYDROCHLORIDE 40 MG/1
1.5 TABLET ORAL
Qty: 0 | Refills: 0 | DISCHARGE

## 2021-04-17 RX ORDER — TIOTROPIUM BROMIDE 18 UG/1
2 CAPSULE ORAL; RESPIRATORY (INHALATION)
Qty: 0 | Refills: 0 | DISCHARGE

## 2021-04-17 RX ORDER — IPRATROPIUM/ALBUTEROL SULFATE 18-103MCG
3 AEROSOL WITH ADAPTER (GRAM) INHALATION ONCE
Refills: 0 | Status: COMPLETED | OUTPATIENT
Start: 2021-04-17 | End: 2021-04-17

## 2021-04-17 RX ORDER — ENOXAPARIN SODIUM 100 MG/ML
40 INJECTION SUBCUTANEOUS AT BEDTIME
Refills: 0 | Status: DISCONTINUED | OUTPATIENT
Start: 2021-04-17 | End: 2021-04-20

## 2021-04-17 RX ORDER — ACETAMINOPHEN 500 MG
650 TABLET ORAL EVERY 4 HOURS
Refills: 0 | Status: DISCONTINUED | OUTPATIENT
Start: 2021-04-17 | End: 2021-04-20

## 2021-04-17 RX ORDER — AMLODIPINE BESYLATE 2.5 MG/1
1 TABLET ORAL
Qty: 0 | Refills: 0 | DISCHARGE

## 2021-04-17 RX ORDER — BUDESONIDE AND FORMOTEROL FUMARATE DIHYDRATE 160; 4.5 UG/1; UG/1
2 AEROSOL RESPIRATORY (INHALATION)
Refills: 0 | Status: DISCONTINUED | OUTPATIENT
Start: 2021-04-17 | End: 2021-04-20

## 2021-04-17 RX ORDER — OXYCODONE HYDROCHLORIDE 5 MG/1
10 TABLET ORAL ONCE
Refills: 0 | Status: DISCONTINUED | OUTPATIENT
Start: 2021-04-17 | End: 2021-04-17

## 2021-04-17 RX ORDER — IPRATROPIUM/ALBUTEROL SULFATE 18-103MCG
3 AEROSOL WITH ADAPTER (GRAM) INHALATION
Refills: 0 | Status: COMPLETED | OUTPATIENT
Start: 2021-04-17 | End: 2021-04-17

## 2021-04-17 RX ORDER — FLUTICASONE PROPIONATE 50 MCG
1 SPRAY, SUSPENSION NASAL
Refills: 0 | Status: DISCONTINUED | OUTPATIENT
Start: 2021-04-17 | End: 2021-04-20

## 2021-04-17 RX ORDER — BUDESONIDE AND FORMOTEROL FUMARATE DIHYDRATE 160; 4.5 UG/1; UG/1
2 AEROSOL RESPIRATORY (INHALATION)
Qty: 0 | Refills: 0 | DISCHARGE

## 2021-04-17 RX ORDER — ONDANSETRON 8 MG/1
4 TABLET, FILM COATED ORAL EVERY 6 HOURS
Refills: 0 | Status: DISCONTINUED | OUTPATIENT
Start: 2021-04-17 | End: 2021-04-20

## 2021-04-17 RX ORDER — SODIUM CHLORIDE 0.65 %
1 AEROSOL, SPRAY (ML) NASAL EVERY 6 HOURS
Refills: 0 | Status: DISCONTINUED | OUTPATIENT
Start: 2021-04-17 | End: 2021-04-20

## 2021-04-17 RX ORDER — AMLODIPINE BESYLATE 2.5 MG/1
10 TABLET ORAL DAILY
Refills: 0 | Status: DISCONTINUED | OUTPATIENT
Start: 2021-04-17 | End: 2021-04-20

## 2021-04-17 RX ORDER — ACETAMINOPHEN 500 MG
975 TABLET ORAL EVERY 6 HOURS
Refills: 0 | Status: DISCONTINUED | OUTPATIENT
Start: 2021-04-17 | End: 2021-04-20

## 2021-04-17 RX ORDER — ALBUTEROL 90 UG/1
2 AEROSOL, METERED ORAL
Qty: 0 | Refills: 0 | DISCHARGE

## 2021-04-17 RX ORDER — ALBUTEROL 90 UG/1
2 AEROSOL, METERED ORAL EVERY 6 HOURS
Refills: 0 | Status: DISCONTINUED | OUTPATIENT
Start: 2021-04-17 | End: 2021-04-18

## 2021-04-17 RX ADMIN — Medication 3 MILLILITER(S): at 07:13

## 2021-04-17 RX ADMIN — Medication 1 SPRAY(S): at 16:45

## 2021-04-17 RX ADMIN — Medication 3 MILLILITER(S): at 06:54

## 2021-04-17 RX ADMIN — OXYCODONE HYDROCHLORIDE 10 MILLIGRAM(S): 5 TABLET ORAL at 21:38

## 2021-04-17 RX ADMIN — Medication 3 MILLILITER(S): at 14:45

## 2021-04-17 RX ADMIN — Medication 125 MILLIGRAM(S): at 07:14

## 2021-04-17 RX ADMIN — MORPHINE SULFATE 4 MILLIGRAM(S): 50 CAPSULE, EXTENDED RELEASE ORAL at 07:08

## 2021-04-17 RX ADMIN — AMLODIPINE BESYLATE 10 MILLIGRAM(S): 2.5 TABLET ORAL at 16:46

## 2021-04-17 RX ADMIN — OXYCODONE HYDROCHLORIDE 10 MILLIGRAM(S): 5 TABLET ORAL at 22:00

## 2021-04-17 RX ADMIN — ALBUTEROL 2 PUFF(S): 90 AEROSOL, METERED ORAL at 17:37

## 2021-04-17 RX ADMIN — ENOXAPARIN SODIUM 40 MILLIGRAM(S): 100 INJECTION SUBCUTANEOUS at 21:40

## 2021-04-17 RX ADMIN — MORPHINE SULFATE 4 MILLIGRAM(S): 50 CAPSULE, EXTENDED RELEASE ORAL at 08:50

## 2021-04-17 RX ADMIN — Medication 1 SPRAY(S): at 16:44

## 2021-04-17 RX ADMIN — Medication 3 MILLILITER(S): at 07:57

## 2021-04-17 RX ADMIN — BUDESONIDE AND FORMOTEROL FUMARATE DIHYDRATE 2 PUFF(S): 160; 4.5 AEROSOL RESPIRATORY (INHALATION) at 20:44

## 2021-04-17 RX ADMIN — MORPHINE SULFATE 4 MILLIGRAM(S): 50 CAPSULE, EXTENDED RELEASE ORAL at 06:53

## 2021-04-17 RX ADMIN — MORPHINE SULFATE 4 MILLIGRAM(S): 50 CAPSULE, EXTENDED RELEASE ORAL at 08:35

## 2021-04-17 RX ADMIN — Medication 1 TABLET(S): at 17:54

## 2021-04-17 NOTE — ED PROVIDER NOTE - OBJECTIVE STATEMENT
76 yo M former smoker with COPD, lung ca, occasional home O2 use , c/o worse R anterior lower chest pain, sharp, moderate, ongoing for weeks, assoc c worse SOB. pt states he needed oxygen all day yesterday which is not typical. normal usual cough. no fever/chills. no vomiting/diarrhea. pt finishing steroid taper.

## 2021-04-17 NOTE — H&P ADULT - NSHPLABSRESULTS_GEN_ALL_CORE
LABS:                        13.3   9.51  )-----------( 134      ( 17 Apr 2021 06:50 )             39.7     04-17    133<L>  |  100  |  36<H>  ----------------------------<  105<H>  4.4   |  27  |  1.20    Ca    8.5      17 Apr 2021 06:50    TPro  6.5  /  Alb  3.4  /  TBili  0.7  /  DBili  x   /  AST  34  /  ALT  42  /  AlkPhos  39<L>  04-17    PT/INR - ( 17 Apr 2021 06:50 )   PT: 11.4 sec;   INR: 0.94 ratio         PTT - ( 17 Apr 2021 06:50 )  PTT:24.4 sec     CAPILLARY BLOOD GLUCOSE        ABG - ( 17 Apr 2021 09:58 )  pH, Arterial: 7.37  pH, Blood: x     /  pCO2: 42    /  pO2: 95    / HCO3: x     / Base Excess: x     /  SaO2: 96        RADIOLOGY & ADDITIONAL TESTS:    < from: CT Angio Chest w/ IV Cont (04.17.21 @ 08:51) >    IMPRESSION:  1. Respiratory motion at the time of image acquisition limits assessment for pulmonary embolism. No filling defects are identified within the main pulmonary arteries or lobar arterial branches. The segmental and subsegmental arterial branches are unable to be assessed.  2. There is marked narrowing and near occlusion of the left upper lobe bronchial anatomy, progressive from prior evaluation.   There is interval increase in soft tissue masses identified within the left thorax. Interval increase in size of a soft tissue mass within the left upper lobe now measuring 8.7 x 5.3 cm with mediastinal invasion and encasement of the left upper lobe arterial anatomy. Additional masses within the left upper lobe demonstrate interval increase in size. There is interval increase in size of a right lower lobe lung mass currently measuring 6.7 x 3.6 cm.  3. Progressive mediastinal lymphadenopathy.  4. Stable compression fracture deformity of T7 and T8 with new compression fracture deformity of T9.    < end of copied text >        Consultant(s) Notes Reviewed:  [x ] YES  [ ] NO  Care Discussed with Consultants/Other Providers [ x] YES  [ ] NO  Imaging Personally Reviewed:  [X ] YES  [ ] NO LABS:                        13.3   9.51  )-----------( 134      ( 17 Apr 2021 06:50 )             39.7     04-17    133<L>  |  100  |  36<H>  ----------------------------<  105<H>  4.4   |  27  |  1.20    Ca    8.5      17 Apr 2021 06:50    TPro  6.5  /  Alb  3.4  /  TBili  0.7  /  DBili  x   /  AST  34  /  ALT  42  /  AlkPhos  39<L>  04-17    PT/INR - ( 17 Apr 2021 06:50 )   PT: 11.4 sec;   INR: 0.94 ratio         PTT - ( 17 Apr 2021 06:50 )  PTT:24.4 sec     CAPILLARY BLOOD GLUCOSE        ABG - ( 17 Apr 2021 09:58 )  pH, Arterial: 7.37  pH, Blood: x     /  pCO2: 42    /  pO2: 95    / HCO3: x     / Base Excess: x     /  SaO2: 96        RADIOLOGY & ADDITIONAL TESTS:    < from: CT Angio Chest w/ IV Cont (04.17.21 @ 08:51) >    IMPRESSION:  1. Respiratory motion at the time of image acquisition limits assessment for pulmonary embolism. No filling defects are identified within the main pulmonary arteries or lobar arterial branches. The segmental and subsegmental arterial branches are unable to be assessed.  2. There is marked narrowing and near occlusion of the left upper lobe bronchial anatomy, progressive from prior evaluation.   There is interval increase in soft tissue masses identified within the left thorax. Interval increase in size of a soft tissue mass within the left upper lobe now measuring 8.7 x 5.3 cm with mediastinal invasion and encasement of the left upper lobe arterial anatomy. Additional masses within the left upper lobe demonstrate interval increase in size. There is interval increase in size of a right lower lobe lung mass currently measuring 6.7 x 3.6 cm.  3. Progressive mediastinal lymphadenopathy.  4. Stable compression fracture deformity of T7 and T8 with new compression fracture deformity of T9.    < end of copied text >      Consultant(s) Notes Reviewed:  [x ] YES  [ ] NO  Care Discussed with Consultants/Other Providers [ x] YES  [ ] NO  Imaging Personally Reviewed:  [X ] YES  [ ] NO

## 2021-04-17 NOTE — H&P ADULT - NSHPSOCIALHISTORY_GEN_ALL_CORE
Tobacco: severe tobacco abuse hx, quit 1 year ago at cancer dx, smoked 1 ppd x30 years  Alcohol: 5 cocktails / week  Illicit drug use: denies

## 2021-04-17 NOTE — CONSULT NOTE ADULT - ASSESSMENT
Assessment  1. Worsening Cough/SOB - suspect from Sinusitis / PND  2. CT Showing : There is marked narrowing and near occlusion of the left upper lobe bronchial anatomy, progressive from prior evaluation.   There is interval increase in soft tissue masses identified within the left thorax. Interval increase in size of a soft tissue mass within the left upper lobe now measuring 8.7 x 5.3 cm with mediastinal invasion and encasement of the left upper lobe arterial anatomy. Additional masses within the left upper lobe demonstrate interval increase in size. AND There is interval increase in size of a right lower lobe lung mass currently measuring 6.7 x 3.6 cm.  3. Underlying Small Cell Lung cancer on chemo  4. Severe COPD on home o2      Plan  Prednisone 40mg PO Daily, taper 10 mg every 3 days till can see Primary pulm MD  Augmentin x 10 days  Flonase BID  Nasal saline spray  Humidify o2  Continue home Spiriva / Symbicort  will need out patient f/u with oncology for PET and to discuss chemo therapy.  Consider out patient ENT f/u can be considered  d/w Dr Gamboa.      Assessment  1. Worsening Cough/SOB - suspect from Sinusitis / PND  2. CT Showing : There is marked narrowing and near occlusion of the left upper lobe bronchial anatomy, progressive from prior evaluation.   There is interval increase in soft tissue masses identified within the left thorax. Interval increase in size of a soft tissue mass within the left upper lobe now measuring 8.7 x 5.3 cm with mediastinal invasion and encasement of the left upper lobe arterial anatomy. Additional masses within the left upper lobe demonstrate interval increase in size. AND There is interval increase in size of a right lower lobe lung mass currently measuring 6.7 x 3.6 cm.  3. Underlying Small Cell Lung cancer on chemo  4. Severe COPD on home o2      Plan  Prednisone 40mg PO Daily, taper 10 mg every 3 days till can see Primary pulm MD  Augmentin x 10 days  Flonase BID  Nasal saline spray  Humidify o2  check sputum culture  Continue home Spiriva / Symbicort  will need out patient f/u with oncology for PET and to discuss chemo therapy.  Consider out patient ENT f/u can be considered  d/w Dr Gamboa.

## 2021-04-17 NOTE — H&P ADULT - HISTORY OF PRESENT ILLNESS
Mr. 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 Patient recently underwent , who presents with SOB.  Mr. Hernandez is a 76 yo M with PMH significant for metastatic small cell lung ca s/p chemotherapy currently on immunotherapy at [place], COPD on 2 L home O2, HTN, chronic back pain. Patient recently underwent , who presents with abdominal pain, but has been having progressive SOB.  Mr. Hernandez is a 76 yo M with PMH significant for metastatic small cell lung ca s/p chemotherapy currently on immunotherapy at [place], COPD on 2 L home O2, HTN, chronic back pain. Patient recently underwent , who presents with RUQ abdominal pain, but has been having progressive SOB since last chemotherapy session.     pt endorses chronic cough productive of white/yellow phleghm.   Pt denies having any fevers, chills, nausea, vomiting, diarrhea.   Mr. Hernandez is a 74 yo M with PMH significant for COPD on 2 L home O2, HTN, chronic back pain, and aggressive metastatic small cell lung ca s/p chemotherapy s/p immunotherapy at [place]. Patient recently underwent a new type of chemotherapy 5 days ago (4/13).  He has felt progressive SOB since his last chemotherapy session. He comes to the ER because of RUQ abdominal pain that seems to start in back and radiates to front. Pt endorses chronic cough productive of white/yellow phlegm. Pt denies having any fevers, chills, nausea, vomiting, diarrhea.      ER course:  - 125 mg methylprednisolone   - given IV morphine x2  Mr. Hernandez is a 76 yo M with PMH significant for COPD on 2 L home O2, HTN, chronic back pain, and aggressive metastatic small cell lung ca s/p chemotherapy s/p immunotherapy at [place]. Patient recently underwent a new type of chemotherapy 5 days ago (4/13).  He has felt progressive SOB since his last chemotherapy session. He comes to the ER because of RUQ abdominal pain that seems to start in back and radiates to front. He says that abdominal pain started last night after eating and went away. When it returned at 5 AM, he wanted to come to ER to get checked out. He says pain medications made it go away.      Pt endorses chronic cough productive of white/yellow phlegm that seems to be worse recently. Pt denies having any fevers, chills, nausea, vomiting, diarrhea. He is on chronic prednisone 10 mg qd. He was admitted in February 2021 for COPD exacerbation, placed on levaquin and steroid taper.        ER course:  - 125 mg methylprednisolone   - given IV morphine x2

## 2021-04-17 NOTE — CHART NOTE - NSCHARTNOTEFT_GEN_A_CORE
Reported by RN that pt has 5 bottles of own medication (methadone, zyprexa, zofran, prochlorperazine and prednisone). Pt reported he last filled methadone was 10/2020 and now he still has not finished the methadone yet. He also reported he was prescribed by his Oncologist to have oxycodone 5mg q6 prn. However, he was actually taking 15mg at home. He was also found to have wheezing.       PAST MEDICAL & SURGICAL HISTORY:  Small cell carcinoma of lung    Hypertension, unspecified type    Multifocal pneumonia    COPD, mild    No significant past surgical history      Allergies    No Known Allergies    Intolerances      FAMILY HISTORY:  Family history of CVA (Mother)        Review of Systems:  CONSTITUTIONAL: No fever, chills, or fatigue  EYES: No eye pain, visual disturbances, or discharge  ENMT:  No difficulty hearing, tinnitus, vertigo; No sinus or throat pain  NECK: No pain or stiffness  RESPIRATORY: No cough, wheezing, chills or hemoptysis; No shortness of breath  CARDIOVASCULAR: No chest pain, palpitations, dizziness, or leg swelling  GASTROINTESTINAL: No abdominal or epigastric pain. No nausea, vomiting, or hematemesis; No diarrhea or constipation. No melena or hematochezia.  GENITOURINARY: No dysuria, frequency, hematuria, or incontinence  NEUROLOGICAL: No headaches, memory loss, loss of strength, numbness, or tremors  SKIN: No itching, burning, rashes, or lesions   MUSCULOSKELETAL: No joint pain or swelling; No muscle, back, or extremity pain  PSYCHIATRIC: No depression, anxiety, mood swings, or difficulty sleeping      Medications:  amoxicillin  875 milliGRAM(s)/clavulanate 1 Tablet(s) Oral daily    amLODIPine   Tablet 10 milliGRAM(s) Oral daily    ALBUTerol    90 MICROgram(s) HFA Inhaler 2 Puff(s) Inhalation every 6 hours PRN  budesonide 160 MICROgram(s)/formoterol 4.5 MICROgram(s) Inhaler 2 Puff(s) Inhalation two times a day  tiotropium 18 MICROgram(s) Capsule 1 Capsule(s) Inhalation daily    acetaminophen   Tablet .. 650 milliGRAM(s) Oral every 4 hours PRN  acetaminophen   Tablet .. 975 milliGRAM(s) Oral every 6 hours PRN  ondansetron Injectable 4 milliGRAM(s) IV Push every 6 hours PRN  oxyCODONE    IR 5 milliGRAM(s) Oral every 6 hours PRN  oxyCODONE    IR 10 milliGRAM(s) Oral once      enoxaparin Injectable 40 milliGRAM(s) SubCutaneous at bedtime              fluticasone propionate 50 MICROgram(s)/spray Nasal Spray 1 Spray(s) Both Nostrils two times a day  sodium chloride 0.65% Nasal 1 Spray(s) Both Nostrils every 6 hours        Vital Signs Last 24 Hrs  T(C): 36.9 (17 Apr 2021 19:28), Max: 36.9 (17 Apr 2021 12:45)  T(F): 98.4 (17 Apr 2021 19:28), Max: 98.5 (17 Apr 2021 12:45)  HR: 67 (17 Apr 2021 19:28) (67 - 83)  BP: 124/68 (17 Apr 2021 19:28) (120/71 - 150/90)  BP(mean): 70 (17 Apr 2021 12:45) (70 - 82)  RR: 17 (17 Apr 2021 19:28) (13 - 20)  SpO2: 100% (17 Apr 2021 19:28) (95% - 100%)      I&O's Detail        LABS:                        13.3   9.51  )-----------( 134      ( 17 Apr 2021 06:50 )             39.7     04-17    133<L>  |  100  |  36<H>  ----------------------------<  105<H>  4.4   |  27  |  1.20    Ca    8.5      17 Apr 2021 06:50    TPro  6.5  /  Alb  3.4  /  TBili  0.7  /  DBili  x   /  AST  34  /  ALT  42  /  AlkPhos  39<L>  04-17      CARDIAC MARKERS ( 17 Apr 2021 06:50 )  <.017 ng/mL / x     / x     / x     / x          CAPILLARY BLOOD GLUCOSE        PT/INR - ( 17 Apr 2021 06:50 )   PT: 11.4 sec;   INR: 0.94 ratio         PTT - ( 17 Apr 2021 06:50 )  PTT:24.4 sec    CULTURES:      Physical Examination:    General: No acute distress.  Alert, oriented, interactive, nonfocal    HEENT: Pupils equal, reactive to light.  Symmetric.    PULM: Clear to auscultation bilaterally, no significant sputum production, +wheezing    CVS: Regular rate and rhythm, no murmurs, rubs, or gallops    ABD: Soft, nondistended, nontender, normoactive bowel sounds, no masses    EXT: No edema, nontender    SKIN: Warm and well perfused, no rashes noted.    Neuro: Alert and awake, follows command, move all four extremities    A/P:  76 yo M with PMH significant for severe past tobacco abuse, COPD on 2 L home O2, HTN, chronic back pain, and aggressive metastatic small cell lung ca on new chemotherapy, who presents from home because of abdominal pain, admitted because of worsening shortness of breath and acute on chronic hypoxic respiratory failure. CTA chest showing near occlusion of MIRELLA bronchus w/ inc in MIRELLA soft tissue mass.     #Acute on chronic resp failure 2/2 lung ca  -Will give albuterol treatment for wheezing  -C/w 2L oxygen via NC    #Lung pain  -Will give one dose of oxycodone 10mg now  -Will keep pt own methadone to pharmacy for now  -Consider pain management consult     -Continue rest of the care

## 2021-04-17 NOTE — H&P ADULT - ASSESSMENT
CTA chest w/ advanced emphysema, left upper bronchial marked narrowing. Increase in size of RLL lung mass.   - Pulm consult     Thoracic vertebra compression fracture  - seen on CTA  - T7-T8 stable , new fx at T9.   - f/u AM vitamin D level     # COPD exacerbation  CTA chest w/ advanced emphysema, left upper bronchial marked narrowing. Increase in size of RLL lung mass.       # Cancer related pain  - being weaned off of methadone, last filled 30 day supply in October 2020.  - on oxycodone 5 mg q6h PRN , filled by [facility]   Istop Reference#: [id number]    Thoracic vertebra compression fracture  - seen on CTA  - T7-T8 stable , new fx at T9.   - f/u AM vitamin D level    # Hx of severe tobacco abuse  Quit 1 year ago at cancer dx, smoked 1 ppd x30 years      DVT ppx: lovenox  DNR / DNI, MOLST in chart.      admitted to telemetry for progressive SOB.    # Increased shortness of breath and acute on chronic hypoxic respiratory failure secondary to Progressive small cell lung cancer  # COPD exacerbation  CTA chest w/ advanced emphysema, left upper bronchial marked narrowing. Increase in size of RLL lung mass.   - given 125 mg methylprednisolone in ER  - place on 40 mg prednisone tomorrow    # Cancer related pain  - being weaned off of methadone, last filled 30 day supply in October 2020.  - on oxycodone 5 mg q6h PRN , filled by [facility].   Istop Reference#: [id number]    Thoracic vertebra compression fracture  - seen on CTA  - T7-T8 stable , new fx at T9.   - f/u AM vitamin D level    # Hx of severe tobacco abuse  Quit 1 year ago at cancer dx, smoked 1 ppd x30 years      DVT ppx: lovenox  DNR / DNI, MOLST in chart.      admitted to telemetry for progressive SOB.    # Increased shortness of breath and acute on chronic hypoxic respiratory failure secondary to Progressive small cell lung cancer and left bronchial narrowing.   # Emphysema exacerbation in setting of advanced emphysema  CTA chest w/ advanced emphysema, left upper bronchial marked narrowing. Increase in size of RLL lung mass.   - given 125 mg methylprednisolone in ER  - place on 40 mg prednisone tomorrow. Normally on chronic prednisone 10 mg qd  - duonebs  - cont home spiriva qd  - continuous pulse ox.     # Cancer related pain  - being weaned off of methadone, last filled 30 day supply in October 2020.  - on oxycodone 5 mg q6h PRN , filled by [facility]   Istop Reference#: [id number]    #Thoracic vertebra compression fracture  - seen on CTA  - T7-T8 stable , new fx at T9.   - f/u AM vitamin D level    # Acute hyponatremia      # HTN  - cont home amlodipine 10 mg qd    # Hx of severe tobacco abuse  Quit 1 year ago at cancer dx, smoked 1 ppd x30 years      DVT ppx: lovenox  DNR / DNI, MOLST in chart.   med rec: performed with wife and , not totally clear on all meds.    admitted to telemetry for progressive SOB.    # Increased shortness of breath and acute on chronic hypoxic respiratory failure secondary to Progressive small cell lung cancer and left bronchial narrowing.   # Emphysema exacerbation in setting of advanced emphysema  CTA chest w/ advanced emphysema, left upper bronchial marked narrowing. Increase in size of RLL lung mass.   - given 125 mg methylprednisolone in ER  - place on 40 mg prednisone tomorrow. Normally on chronic prednisone 10 mg qd  - duonebs  - cont home spiriva qd  - continuous pulse ox.     # Sinusitis  - Seen by Pulm, started on flonase and ocean nasal spray.  - will additionally start augmentin    # Cancer related pain  - being weaned off of methadone, last filled 30 day supply in October 2020.  - on oxycodone 5 mg q6h PRN , filled by [facility].   Istop Reference#: [id number]    # Back/abdominal pain likely 2/2  #Thoracic vertebra compression fracture  Pain has back pain, radiating to front, complaining of RUQ abd pain.  Abdomen benign. LFTs wnl.   - CTA showing T7-T8 stable , new fx at T9.   - abdominal U/S ordered.   - f/u AM vitamin D level    # Acute hyponatremia      # HTN  - cont home amlodipine 10 mg qd    # Hx of severe tobacco abuse  Quit 1 year ago at cancer dx, smoked 1 ppd x30 years      DVT ppx: lovenox  DNR / DNI, MOLST in chart.   med rec: performed with wife and , not totally clear on all meds.    74 yo M with PMH significant for severe past tobacco abuse, COPD on 2 L home O2, HTN, chronic back pain, and aggressive metastatic small cell lung ca on new chemotherapy, who presents from home because of abdominal pain, admitted because of worsening shortness of breath and acute on chronic hypoxic respiratory failure    # acute on chronic hypoxic respiratory failure likely secondary to Progressive small cell lung cancer and left bronchial narrowing.   # Emphysema exacerbation in setting of advanced emphysema  # Increased shortness of breath  CTA chest w/ advanced emphysema, left upper bronchial marked narrowing. Increase in size of RLL lung mass.   - S/p 125 mg methylprednisolone in ER. Start 40 mg prednisone tomorrow. Taper every 3 days to 10 mg qd.   - Normally on chronic prednisone 10 mg qd  - sputum culture  - scheduled duonebs  - cont home spiriva qd. Started on symbicort.   - continuous pulse ox.   - on augmentin as below  - discussed case personally w/ patient's Oncologist, Dr. Montemayor. Discussed case with Pulmonary, Dr. Hopkins, who believes pt's sx due to sinusitis.     # Shortness of breath possibly due to Sinusitis/post nasal drip  - Seen by Pulm, who suspects cough/SOB is due to sinusitis and post nasal drip.   - started on flonase and ocean nasal spray.  - humidified air by nasal cannula   - augmentin for sinusitis.     # Cancer related pain  - being weaned off of methadone, last filled 30 day supply in October 2020.  - on oxycodone 5 mg q6h PRN , filled by [facility].   Istop Reference#: [id number]    # Back/abdominal pain likely 2/2  #Thoracic vertebra compression fracture  Pain has back pain, radiating to front, complaining of RUQ abd pain.  Abdomen benign without any guarding or tenderness on palpation. He is currently without pain, but has received narcotics. LFTs wnl.  CTA showing T7-T8 stable , new fx at T9.   - abdominal U/S ordered. If worsens overnight, recommend CT abdomen/pelvis.    - f/u AM vitamin D level    # Acute hyponatremia  - possibly due to dehydration  - f/u Na in AM.     # HTN  - cont home amlodipine 10 mg qd    # Hx of severe tobacco abuse  Quit 1 year ago at cancer dx, smoked 1 ppd x30 years    DVT ppx: lovenox  DNR / DNI, MOLST in chart.   med rec: performed with wife and , not totally clear on all meds.   Dispo: likely can d/c in AM.     IMPROVE VTE Individual Risk Assessment    RISK                                                                Points    [  ] Previous VTE                                                  3    [  ] Thrombophilia                                               2    [  ] Lower limb paralysis                                      2        (unable to hold up >15 seconds)      [  x] Current Cancer                                              2         (within 6 months)    [  ] Immobilization > 24 hrs                                1    [  ] ICU/CCU stay > 24 hours                              1    [ x ] Age > 60                                                      1    IMPROVE VTE Score _3________    IMPROVE Score 0-1: Low Risk, No VTE prophylaxis required for most patients, encourage ambulation.   IMPROVE Score 2-3: At risk, pharmacologic VTE prophylaxis is indicated for most patients (in the absence of a contraindication)  IMPROVE Score > or = 4: High Risk, pharmacologic VTE prophylaxis is indicated for most patients (in the absence of a contraindication)   76 yo M with PMH significant for severe past tobacco abuse, COPD on 2 L home O2, HTN, chronic back pain, and aggressive metastatic small cell lung ca on new chemotherapy, who presents from home because of abdominal pain, admitted because of worsening shortness of breath and acute on chronic hypoxic respiratory failure. CTA chest showing near occlusion of MIRELLA bronchus w/ inc in MIRELLA soft tissue mass.     # Acute on chronic hypoxic respiratory failure likely secondary to Progressive small cell lung cancer and left bronchial occlusion   # Emphysema exacerbation in setting of advanced emphysema  # Increased shortness of breath  CTA chest w/ advanced emphysema, left upper bronchial marked narrowing. Increase in size of RLL lung mass. Pulmonary consulted to see if any intervention required.   - Normally on chronic prednisone 10 mg qd. S/p 125 mg methylprednisolone in ER.   - Start 40 mg prednisone tomorrow. Taper every 3 days to 10 mg qd.   - sputum culture  - scheduled duonebs  - cont home spiriva qd. Started on symbicort by Pulm.   - continuous pulse ox.   - on augmentin as below  - discussed case personally w/ patient's Oncologist, Dr. Montemayor. Discussed case with Pulmonary, Dr. Hopkins, who believes pt's sx due to sinusitis.     # Shortness of breath possibly due to Sinusitis/post nasal drip  - Seen by Pulm, who suspects cough/SOB is due to sinusitis and post nasal drip.   - started on flonase and ocean nasal spray.  - humidified air by nasal cannula   - augmentin for sinusitis.     # Cancer related pain  - being weaned off of methadone, last filled 30 day supply in October 2020.  - on oxycodone 5 mg q6h PRN , [facility]   Istop Reference#: [id number]    # Back/abdominal pain likely 2/2  # Thoracic vertebra compression fracture  Pain has back pain, radiating to front, w/ chief complaint of RUQ abd pain.  Abdomen benign without any guarding or tenderness on palpation. He is currently without pain, but has received narcotics. LFTs wnl.  CTA showing T7-T8 stable , new fx at T9.   - abdominal U/S ordered. If worsens overnight, recommend CT abdomen/pelvis.    - f/u AM vitamin D level    # Acute hyponatremia  - possibly due to dehydration  - f/u Na in AM.     # HTN  - cont home amlodipine 10 mg qd    # Hx of severe tobacco abuse  Quit 1 year ago at cancer dx, smoked 1 ppd x30 years    DVT ppx: lovenox  DNR / DNI, MOLST in chart.   med rec: performed with wife and , not totally clear on all meds.   Dispo: likely can d/c in AM.     IMPROVE VTE Individual Risk Assessment    RISK                                                                Points    [  ] Previous VTE                                                  3    [  ] Thrombophilia                                               2    [  ] Lower limb paralysis                                      2        (unable to hold up >15 seconds)      [  x] Current Cancer                                              2         (within 6 months)    [  ] Immobilization > 24 hrs                                1    [  ] ICU/CCU stay > 24 hours                              1    [ x ] Age > 60                                                      1    IMPROVE VTE Score _3________    IMPROVE Score 0-1: Low Risk, No VTE prophylaxis required for most patients, encourage ambulation.   IMPROVE Score 2-3: At risk, pharmacologic VTE prophylaxis is indicated for most patients (in the absence of a contraindication)  IMPROVE Score > or = 4: High Risk, pharmacologic VTE prophylaxis is indicated for most patients (in the absence of a contraindication)   76 yo M with PMH significant for severe past tobacco abuse, COPD on 2 L home O2, HTN, chronic back pain, and aggressive metastatic small cell lung ca on new chemotherapy, who presents from home because of abdominal pain, admitted because of worsening shortness of breath and acute on chronic hypoxic respiratory failure. CTA chest showing near occlusion of MIRELLA bronchus w/ inc in MIRELLA soft tissue mass.     # Acute on chronic hypoxic respiratory failure likely secondary to Progressive small cell lung cancer and left bronchial occlusion   # Emphysema exacerbation in setting of advanced emphysema  # Increased shortness of breath  CTA chest w/ advanced emphysema, left upper bronchial marked narrowing. Increase in size of RLL lung mass. Pulmonary consulted to see if any intervention required.   - Normally on chronic prednisone 10 mg qd. S/p 125 mg methylprednisolone in ER.   - Start 40 mg prednisone tomorrow. Taper every 3 days to 10 mg qd.   - sputum culture  - scheduled duonebs  - cont home spiriva qd. Started on symbicort by Pulm.   - continuous pulse ox.   - on augmentin as below  - discussed case personally w/ patient's Oncologist, Dr. Montemayor. Discussed case with Pulmonary, Dr. Hopkins, who believes pt's sx due to sinusitis.     # Shortness of breath possibly due to Sinusitis/post nasal drip  - Seen by Pulm, who suspects cough/SOB is due to sinusitis and post nasal drip.   - started on flonase and ocean nasal spray.  - humidified air by nasal cannula   - augmentin for sinusitis.     # Cancer related pain  - being weaned off of methadone, last filled 30 day supply in October 2020.  - on oxycodone 5 mg q6h PRN , filled by [facility].   Istop Reference#: [id number]    # Back/abdominal pain likely 2/2  # Thoracic vertebra compression fracture  Pain has back pain, radiating to front, w/ chief complaint of RUQ abd pain.  Abdomen benign without any guarding or tenderness on palpation. He is currently without pain, but has received narcotics. LFTs wnl.  CTA showing T7-T8 stable , new fx at T9.   - abdominal U/S ordered. If worsens overnight, recommend CT abdomen/pelvis.    - f/u AM vitamin D level    # Acute hyponatremia  - possibly due to dehydration  - f/u Na in AM.     # HTN  - cont home amlodipine 10 mg qd    # Hx of severe tobacco abuse  Quit 1 year ago at cancer dx, smoked 1 ppd x30 years    DVT ppx: lovenox  DNR / DNI, MOLST in chart.   med rec: performed with wife and , not totally clear on all meds.   Dispo: likely can d/c in AM, pending abd u/s.     IMPROVE VTE Individual Risk Assessment    RISK                                                                Points    [  ] Previous VTE                                                  3    [  ] Thrombophilia                                               2    [  ] Lower limb paralysis                                      2        (unable to hold up >15 seconds)      [  x] Current Cancer                                              2         (within 6 months)    [  ] Immobilization > 24 hrs                                1    [  ] ICU/CCU stay > 24 hours                              1    [ x ] Age > 60                                                      1    IMPROVE VTE Score _3________    IMPROVE Score 0-1: Low Risk, No VTE prophylaxis required for most patients, encourage ambulation.   IMPROVE Score 2-3: At risk, pharmacologic VTE prophylaxis is indicated for most patients (in the absence of a contraindication)  IMPROVE Score > or = 4: High Risk, pharmacologic VTE prophylaxis is indicated for most patients (in the absence of a contraindication)

## 2021-04-17 NOTE — CONSULT NOTE ADULT - SUBJECTIVE AND OBJECTIVE BOX
PULMONARY CONSULT  Location of Patient :  [facility] ( [facility])  Attending requesting Consult:Hoda Gamboa  Chief Complaint :    SOB/Abd Pain  Reason For consult : Abnormal CT      Initial HPI on admission:  HPI:  75 year old male with h/o Smoking, with h/o metastatic Small Cell Lung Ca x 2 year s/p Chemotherapy and immunatherapy last one  6 Weeks ago an and started on chemo new regimen last week, Brain mets s/p Cyber knife, COPD on home o2 x few months, Chronic back pain with T6 compression fracture after coughing HTN, who p/w increasing abd pain RUQ and SOb x few weeks.   Patient has seen PMD/primary team multiple times and has gotten multiple courses of abx and steroids.  remains on 10mg prednisone at this time.   states cough improves transiently with abx and steroids but when abx finsish cough gets more purulent and worse.  cough worse in am when wakes up and when lays flt.  states secretions feel tickle in back of throat leading to worsening sob  denies fever or chills  + deviated septum  + dry nose  after further discussing cough/secreitons worsened after starting n/c o2       PAST MEDICAL & SURGICAL HISTORY:  Small cell carcinoma of lung    Hypertension, unspecified type    Multifocal pneumonia    COPD, mild    No significant past surgical history      Allergies    No Known Allergies    Intolerances      FAMILY HISTORY:  Family history of CVA (Mother)      Social history: Social History:  Tobacco: severe tobacco abuse hx, quit 1 year ago at cancer dx, smoked 1 ppd x30 years  Alcohol: 5 cocktails / week  Illicit drug use: denies (17 Apr 2021 11:35)  worked in [employer] + Fume exposure    Review of Systems: as stated above    CONSTITUTIONAL: No fever, No chills, +fatigue  EYES: No eye pain, No visual disturbances, No discharge  ENMT:  No difficulty hearing, No tinnitus, No vertigo; No sinus or throat pain  NECK: No pain, No stiffness  RESPIRATORY: No Cough, No SOB, No Secretions  CARDIOVASCULAR: No chest pain, No palpitations, No dizziness, or No leg swelling  GASTROINTESTINAL: +abdominal +epigastric pain. No nausea, No vomiting, No hematemesis; No diarrhea, No constipation. No melena, No hematochezia.  GENITOURINARY: No dysuria, No frequency, No hematuria, No incontinence  NEUROLOGICAL: No headaches, No memory loss, No loss of strength, No numbness, No tremors  SKIN: No itching, No burning, No rashes, No lesions   MUSCULOSKELETAL: No joint pain or swelling; No muscle, back, No extremity pain  PSYCHIATRIC: No depression, No anxiety, No mood swings, No difficulty sleeping      Medications:  MEDICATIONS  (STANDING):  albuterol/ipratropium for Nebulization. 3 milliLiter(s) Nebulizer once  amLODIPine   Tablet 10 milliGRAM(s) Oral daily  enoxaparin Injectable 40 milliGRAM(s) SubCutaneous at bedtime    MEDICATIONS  (PRN):  acetaminophen   Tablet .. 650 milliGRAM(s) Oral every 4 hours PRN Mild Pain (1 - 3)  acetaminophen   Tablet .. 975 milliGRAM(s) Oral every 6 hours PRN Moderate Pain (4 - 6)  ALBUTerol    90 MICROgram(s) HFA Inhaler 2 Puff(s) Inhalation every 6 hours PRN Shortness of Breath and/or Wheezing  ondansetron Injectable 4 milliGRAM(s) IV Push every 6 hours PRN Nausea  oxyCODONE    IR 5 milliGRAM(s) Oral every 6 hours PRN Severe Pain (7 - 10)      Home Medications:  Last Order Reconciliation Date: 04-17-21 @ 13:22 (Admission Reconciliation)  Albuterol (Eqv-ProAir HFA) 90 mcg/inh inhalation aerosol: 2 puff(s) inhaled every 6 hours (04-17-21 @ 06:48)  amLODIPine 10 mg oral tablet: 1 tab(s) orally once a day (04-17-21 @ 06:48)  methadone 10 mg oral tablet: 1.5 tab(s) orally once a day (04-17-21 @ 06:48)  predniSONE 10 mg oral tablet: 1 tab(s) orally once a day (04-17-21 @ 13:15)  Spiriva Respimat 1.25 mcg/inh inhalation aerosol: 2 puff(s) inhaled once a day (04-17-21 @ 13:18)  Symbicort 160 mcg-4.5 mcg/inh inhalation aerosol: 2 puff(s) inhaled 2 times a day (04-17-21 @ 06:48)  Zofran 8 mg oral tablet: 1 tab(s) orally 3 times a day, As Needed (04-17-21 @ 13:21)      LABS:                        13.3   9.51  )-----------( 134      ( 17 Apr 2021 06:50 )             39.7     04-17    133<L>  |  100  |  36<H>  ----------------------------<  105<H>  4.4   |  27  |  1.20    Ca    8.5      17 Apr 2021 06:50    TPro  6.5  /  Alb  3.4  /  TBili  0.7  /  DBili  x   /  AST  34  /  ALT  42  /  AlkPhos  39<L>  04-17         RADIOLOGY  CXR:      CT:  < from: CT Angio Chest w/ IV Cont (04.17.21 @ 08:51) >    EXAM:  CT ANGIO CHEST (W)AW IC      PROCEDURE DATE:  04/17/2021        INTERPRETATION:  CLINICAL INFORMATION: Lower chest and back pain. Shortness of breath. History of lung cancer/COPD. Evaluate for pulmonary emboli.    COMPARISON: CT chest 3/31/2021; CTA chest 2/6/2021.    CONTRAST/COMPLICATIONS:  IV Contrast: Omnipaque 350  75 cc administered   25 cc discarded  Oral Contrast: NONE  Complications: None reported at time of study completion    PROCEDURE:  CT Angiography of the Chest.  Sagittal and coronal reformats were performed as well as 3D (MIP) reconstructions.    FINDINGS: Respiratory motion at the time of image acquisition limits assessment for pulmonary embolism. No filling defects are identified within the main pulmonary arteries or lobar arterial branches. The segmental and subsegmental arterial branches are unable to be assessed.    LUNGS AND AIRWAYS: There is marked narrowing and near occlusion of the left upper lobe bronchial anatomy, progressive from prior evaluation.  Advanced emphysematous changes are identified within the bilateral lung parenchyma. There is interval increase in soft tissue masses identified within the left thorax. Interval increase in size of a soft tissue mass within the left upper lobe now measuring 8.7 x 5.3 cm with mediastinal invasion and encasement of the left upper lobe arterial anatomy. Additional masses within the left upper lobe demonstrate interval increase in size. There is interval increase in size of a right lower lobe lung mass currently measuring 6.7 x 3.6 cm. Interstitial opacities noted within the right lower lobe lung parenchyma are stable.  PLEURA: No pleural effusion. No pneumothorax.  MEDIASTINUM AND KIERRA: Interval increase with subcarinal lymphadenopathy now measuring 2.6 x 1.8 cm and AP window lymphadenopathy measuring 1.1 x 1.1 cm. Additional mediastinal lymphadenopathy demonstrates interval increase in size.  VESSELS: Thoracic aortic caliber appears unremarkable.  HEART: Heart size is normal. No pericardial effusion.  CHEST WALL AND LOWER NECK: Within normal limits.  VISUALIZED UPPER ABDOMEN: Right adrenal gland unremarkable. Stable left adrenal nodularity measuring up to 2.1 cm; metastatic neoplastic disease cannot be excluded.  BONES: Stable compression fracture deformity of T7 and T8 with new compression fracture deformity of T9.    IMPRESSION:  1. Respiratory motion at the time of image acquisition limits assessment for pulmonary embolism. No filling defects are identified within the main pulmonary arteries or lobar arterial branches. The segmental and subsegmental arterial branches are unable to be assessed.  2. There is marked narrowing and near occlusion of the left upper lobe bronchial anatomy, progressive from prior evaluation.   There is interval increase in soft tissue masses identified within the left thorax. Interval increase in size of a soft tissue mass within the left upper lobe now measuring 8.7 x 5.3 cm with mediastinal invasion and encasement of the left upper lobe arterial anatomy. Additional masses within the left upper lobe demonstrate interval increase in size. There is interval increase in size of a right lower lobe lung mass currently measuring 6.7 x 3.6 cm.  3. Progressive mediastinal lymphadenopathy.  4. Stable compression fracture deformity of T7 and T8 with new compression fracture deformity of T9.      < end of copied text >    ECHO:      VITALS:  T(C): 36.9 (04-17-21 @ 12:45), Max: 36.9 (04-17-21 @ 12:45)  T(F): 98.5 (04-17-21 @ 12:45), Max: 98.5 (04-17-21 @ 12:45)  HR: 79 (04-17-21 @ 12:45) (78 - 81)  BP: 150/90 (04-17-21 @ 12:45) (120/71 - 150/90)  BP(mean): 70 (04-17-21 @ 12:45) (70 - 82)  ABP: --  ABP(mean): --  RR: 13 (04-17-21 @ 12:45) (13 - 20)  SpO2: 97% (04-17-21 @ 12:45) (95% - 99%)  CVP(mm Hg): --  CVP(cm H2O): --    Ins and Outs       Height (cm): 180.3 (04-17-21 @ 06:38)  Weight (kg): 83.9 (04-17-21 @ 06:38)  BMI (kg/m2): 25.8 (04-17-21 @ 06:38)        I&O's Detail      Physical Examination:  GENERAL:               Alert, Oriented, No acute distress.    HEENT:                  Right frontal sinus tenderness  No JVD, Moist MM + Right sided PND No stridor  PULM:                     Bilateral air entry, upper airway transmited sounds to auscultation bilaterally with episodes of wheezing and rales, no significant sputum production, No gross pulmonary Rales, Rhonchi, Wheezing  CVS:                         S1, S2,  No Murmur  ABD:                        Soft, nondistended, nontender, normoactive bowel sounds,   EXT:                         mild edema, nontender, No Cyanosis or Clubbing   Vascular:                Warm Extremities,    SKIN:                       Warm and well perfused, no rashes noted.   NEURO:                  Alert, oriented, interactive, nonfocal, follows commands  PSYC:                      Calm, + Insight.

## 2021-04-17 NOTE — ED PROVIDER NOTE - CLINICAL SUMMARY MEDICAL DECISION MAKING FREE TEXT BOX
76yo M c lung ca, copd p/w worsening R chest pain c SOB.  diffuse wheeze on exam. partial ddx: COPD exacerbation, pna, acs, chf, PE. check labs, ekg, cxr, cta chest. morphine for pain. give duonebs, solumedrol.  reassess.  pt signed out to Dr Larsen [id number], f/u all labs/testing

## 2021-04-17 NOTE — PATIENT PROFILE ADULT - NS PRO AD ANY ON CHART
Discharge- Cliff Puentes 1954, 63 y o  female MRN: [id number]    Unit/Bed#: -01 Encounter: [id number]    Primary Care Provider: Luis F Olmos MD   Date and time admitted to hospital: 12/14/2019 10:32 PM        * Sepsis [facility])  Assessment & Plan  · POA, evidenced by fever, tachycardia, leukocytosis  Elevated procal  Suspect skin vs blood source  · Patient immunocompromised, recent Port-A-Cath replacement from R chest to L chest   She had a local reaction secondary to tape but had no signs concerning for localized infection  · Lactic normal   Blood cultures no growth  CXR unremarkable  Urine showing Candida but Wei not recommend any treatment  · Suspect patient's fever actually SIRS and related to chemotherapy  Patient is being discharged on oral antibiotics  · Plan of care discussed with the patient, ID, and her primary oncologist at day of discharge  Staghorn calculus  Assessment & Plan  · Seen on CT however patient asymptomatic  Not treating  Leiomyosarcoma [facility])  Assessment & Plan  · With metastatic leiomyosarcoma, follows with oncologist in [facility] and is currently on palliative chemotherapy however is likely going to stop therapy entirely as it is just making her feel too sick, and she would prefer to have quality versus quantity  · Outpatient follow-up as scheduled for next week      Hypertension  Assessment & Plan  · Blood pressure reasonable    Cigarette nicotine dependence without complication  Assessment & Plan  · Provide nicotine replacement therapy, smoking cessation counseling        Discharging Physician / Practitioner: Amee Quarles PA-C  PCP: Luis F Olmos MD  Admission Date:   Admission Orders (From admission, onward)     Ordered        12/15/19 1613  Inpatient Admission  Once         12/15/19 0052  Place in Observation (expected length of stay for this patient is less than two midnights)  Once                   Discharge Date: 12/18/19    Resolved Problems Date Reviewed: 12/20/2019    None          Consultations During Hospital Stay:  · Infectious disease     Procedures Performed:   · CXR  · CT head    Significant Findings / Test Results:   ·     Incidental Findings:   ·      Test Results Pending at Discharge (will require follow up):   ·      Outpatient Tests Requested:  · Follow-up with Oncology discuss continuation of chemotherapy    Complications:      Reason for Admission:  Fever    Hospital Course:     Jessie Patel is a 63 y o  female patient who originally presented to the hospital on 12/14/2019 due to fever  Patient is currently undergoing chemotherapy for metastatic leiomyosarcoma, having last received chemo 3 days prior to admission  Patient had a Port-A-Cath put recently as well after hospitalization October with concern for possible Port-A-Cath infection  Given active chemotherapy the patient was admitted for further workup, Infectious Disease consultation  Overall, it is felt that the patient has SIRS related to the chemotherapy versus viral infection  There is no definitive bacterial source found  Blood cultures remain no growth  Patient remains afebrile  Procalcitonin is fluctuating  Patient has been stabilized from an infectious disease standpoint cleared to discharge  She has no longer required antibiotics  Patient expressing desire to pursue hospice/palliative approach, and will follow up with her oncologist next week as planned  She will return to the ER for any concerning high fevers, or infectious symptoms  Plan of care was discussed by phone with the patient's primary oncologist from [facility]  Please see above list of diagnoses and related plan for additional information  Condition at Discharge: stable     Discharge Day Visit / Exam:     Subjective:  Patient seen examined, expresses great desire to go home  She is thinking of moving towards a palliative approach and discontinued chemotherapy altogether    She wants to be able to be home to [facility]  She has spoken to the Infectious Disease doctor this morning, who does not feel she needs any further antibiotics  Patient denies any worsening of any of her symptoms  She has had no further subjective or tactile fevers  Vitals: Blood Pressure: 118/56 (12/18/19 0726)  Pulse: 94 (12/18/19 0726)  Temperature: 98 3 °F (36 8 °C) (12/18/19 0726)  Temp Source: Oral (12/18/19 0726)  Respirations: 22 (12/18/19 0726)  Height: 5' 8" (172 7 cm) (12/16/19 1502)  Weight - Scale: 126 kg (277 lb 12 5 oz) (12/18/19 0540)  SpO2: 95 % (12/18/19 0800)  Exam:   Physical Exam   Constitutional: She is oriented to person, place, and time  She appears well-developed and well-nourished  No distress  HENT:   Mouth/Throat: Oropharynx is clear and moist    Cardiovascular: Normal rate, regular rhythm, S1 normal, S2 normal and normal heart sounds  No murmur heard  Pulmonary/Chest: Effort normal and breath sounds normal  No respiratory distress  She has no wheezes  She has no rales  Abdominal: Soft  Bowel sounds are normal  She exhibits no distension  There is no tenderness  Musculoskeletal: She exhibits no edema  Neurological: She is alert and oriented to person, place, and time  Skin:   Left chest wall port-a-cath   Psychiatric: She has a normal mood and affect  Her behavior is normal    Nursing note and vitals reviewed  Discussion with Family:  Not present    Discharge instructions/Information to patient and family:   See after visit summary for information provided to patient and family  Provisions for Follow-Up Care:  See after visit summary for information related to follow-up care and any pertinent home health orders  Disposition:     Home    Planned Readmission:  Not     Discharge Statement:  I spent approximately 45 minutes discharging the patient  This time was spent on the day of discharge  I had direct contact with the patient on the day of discharge   Greater than 50% of the total time was spent examining patient, answering all patient questions, arranging and discussing plan of care with patient as well as directly providing post-discharge instructions  Additional time then spent on discharge activities  Discharge Medications:  See after visit summary for reconciled discharge medications provided to patient and family        ** Please Note: This note has been constructed using a voice recognition system ** Yes

## 2021-04-17 NOTE — H&P ADULT - NSICDXFAMILYHX_GEN_ALL_CORE_FT
FAMILY HISTORY:  No pertinent family history in first degree relatives     FAMILY HISTORY:  Mother  Still living? Unknown  Family history of CVA, Age at diagnosis: Age Unknown

## 2021-04-17 NOTE — ED PROVIDER NOTE - CARDIAC, MLM
Normal rate, regular rhythm.  Heart sounds S1, S2.  No murmurs, rubs or gallops.  mild focal ttp R anterior lower chest wall, no lesions

## 2021-04-17 NOTE — H&P ADULT - CONVERSATION DETAILS
Patient does not wish to have shocks, chest compressions, or breathing tube if he were to have cardiopulmonary arrest. He admits he is going to be seeing a palliative care doctor.

## 2021-04-17 NOTE — H&P ADULT - NSHPPHYSICALEXAM_GEN_ALL_CORE
T(C): 36.7 (04-17-21 @ 06:38), Max: 36.7 (04-17-21 @ 06:38)  HR: 78 (04-17-21 @ 09:13) (78 - 81)  BP: 129/57 (04-17-21 @ 09:13) (120/71 - 129/57)  RR: 16 (04-17-21 @ 09:13) (16 - 20)  SpO2: 96% (04-17-21 @ 09:13) (95% - 99%)  Wt(kg): --Vital Signs Last 24 Hrs  T(C): 36.7 (17 Apr 2021 06:38), Max: 36.7 (17 Apr 2021 06:38)  T(F): 98 (17 Apr 2021 06:38), Max: 98 (17 Apr 2021 06:38)  HR: 78 (17 Apr 2021 09:13) (78 - 81)  BP: 129/57 (17 Apr 2021 09:13) (120/71 - 129/57)  BP(mean): 74 (17 Apr 2021 09:13) (74 - 82)  RR: 16 (17 Apr 2021 09:13) (16 - 20)  SpO2: 96% (17 Apr 2021 09:13) (95% - 99%)    PHYSICAL EXAM:  GENERAL: NAD, well-groomed, well-developed  HEAD:  Atraumatic, Normocephalic  EYES: EOMI, PERRLA, conjunctiva and sclera clear  ENMT: No tonsillar erythema, exudates, or enlargement; Moist mucous membranes, Good dentition, No lesions  NECK: Supple, No JVD, Normal thyroid  NERVOUS SYSTEM:  Alert & Oriented X3, Good concentration; Motor Strength 5/5 B/L upper and lower extremities; DTRs 2+ intact and symmetric  CHEST/LUNG: Clear to percussion bilaterally; No rales, rhonchi, wheezing, or rubs  HEART: Regular rate and rhythm; No murmurs, rubs, or gallops  ABDOMEN: Soft, Nontender, Nondistended; Bowel sounds present  EXTREMITIES:  2+ Peripheral Pulses, No clubbing, cyanosis, or edema  LYMPH: No lymphadenopathy noted  SKIN: No rashes or lesions T(C): 36.7 (04-17-21 @ 06:38), Max: 36.7 (04-17-21 @ 06:38)  HR: 78 (04-17-21 @ 09:13) (78 - 81)  BP: 129/57 (04-17-21 @ 09:13) (120/71 - 129/57)  RR: 16 (04-17-21 @ 09:13) (16 - 20)  SpO2: 96% (04-17-21 @ 09:13) (95% - 99%)  Wt(kg): --Vital Signs Last 24 Hrs  T(C): 36.7 (17 Apr 2021 06:38), Max: 36.7 (17 Apr 2021 06:38)  T(F): 98 (17 Apr 2021 06:38), Max: 98 (17 Apr 2021 06:38)  HR: 78 (17 Apr 2021 09:13) (78 - 81)  BP: 129/57 (17 Apr 2021 09:13) (120/71 - 129/57)  BP(mean): 74 (17 Apr 2021 09:13) (74 - 82)  RR: 16 (17 Apr 2021 09:13) (16 - 20)  SpO2: 96% (17 Apr 2021 09:13) (95% - 99%)    PHYSICAL EXAM:  GENERAL: NAD, well-groomed, well-developed  HEAD:  Atraumatic, Normocephalic  EYES: EOMI, PERRLA, conjunctiva and sclera clear  ENMT: No tonsillar erythema, exudates, or enlargement; Moist mucous membranes, Good dentition, wearing dentures, No lesions  NECK: Supple, No JVD, Normal thyroid  NERVOUS SYSTEM:  Alert & Oriented X3, mediocre concentration; Motor Strength intact bilaterally upper and lower extremities; DTRs 2+ intact and symmetric  CHEST/LUNG: pt has wheezes, rhonchi, coarse sounding BS bilaterally. not in respi distress. able to speak full sentences.   HEART: Regular rate and rhythm; No murmurs, rubs, or gallops  ABDOMEN: Soft, Nontender, Nondistended; No guarding. Reported tenderness.   EXTREMITIES:  2+ Peripheral Pulses, No clubbing, cyanosis, or edema  LYMPH: No lymphadenopathy noted  SKIN: No rashes or lesions T(C): 36.7 (04-17-21 @ 06:38), Max: 36.7 (04-17-21 @ 06:38)  HR: 78 (04-17-21 @ 09:13) (78 - 81)  BP: 129/57 (04-17-21 @ 09:13) (120/71 - 129/57)  RR: 16 (04-17-21 @ 09:13) (16 - 20)  SpO2: 96% (04-17-21 @ 09:13) (95% - 99%)  Wt(kg): --Vital Signs Last 24 Hrs  T(C): 36.7 (17 Apr 2021 06:38), Max: 36.7 (17 Apr 2021 06:38)  T(F): 98 (17 Apr 2021 06:38), Max: 98 (17 Apr 2021 06:38)  HR: 78 (17 Apr 2021 09:13) (78 - 81)  BP: 129/57 (17 Apr 2021 09:13) (120/71 - 129/57)  BP(mean): 74 (17 Apr 2021 09:13) (74 - 82)  RR: 16 (17 Apr 2021 09:13) (16 - 20)  SpO2: 96% (17 Apr 2021 09:13) (95% - 99%)    PHYSICAL EXAM:  GENERAL: NAD, well-groomed, well-developed  HEAD:  Atraumatic, Normocephalic  EYES: EOMI, conjunctiva and sclera clear  ENMT: No tonsillar erythema, exudates, or enlargement; Moist mucous membranes, Good dentition, wearing dentures, No lesions  NECK: Supple, No JVD, Normal thyroid  NERVOUS SYSTEM:  Alert & Oriented X3, mediocre concentration; Motor Strength intact bilaterally upper and lower extremities; DTRs 2+ intact and symmetric  CHEST/LUNG: pt has wheezes, rhonchi, coarse sounding BS bilaterally. not in respi distress. able to speak full sentences.   HEART: Regular rate and rhythm; No murmurs, rubs, or gallops  ABDOMEN: Soft, Nontender, Nondistended; No guarding. Reported tenderness on Deep palpation   EXTREMITIES:  2+ Peripheral Pulses, No clubbing, cyanosis, or edema  LYMPH: No lymphadenopathy noted  SKIN: No rashes or lesions on exposed skin surfaces.

## 2021-04-18 LAB
ANION GAP SERPL CALC-SCNC: 9 MMOL/L — SIGNIFICANT CHANGE UP (ref 5–17)
BASOPHILS # BLD AUTO: 0 K/UL — SIGNIFICANT CHANGE UP (ref 0–0.2)
BASOPHILS NFR BLD AUTO: 0 % — SIGNIFICANT CHANGE UP (ref 0–2)
BUN SERPL-MCNC: 40 MG/DL — HIGH (ref 7–23)
CALCIUM SERPL-MCNC: 8.9 MG/DL — SIGNIFICANT CHANGE UP (ref 8.4–10.5)
CHLORIDE SERPL-SCNC: 102 MMOL/L — SIGNIFICANT CHANGE UP (ref 96–108)
CO2 SERPL-SCNC: 26 MMOL/L — SIGNIFICANT CHANGE UP (ref 22–31)
CREAT SERPL-MCNC: 1.08 MG/DL — SIGNIFICANT CHANGE UP (ref 0.5–1.3)
EOSINOPHIL # BLD AUTO: 0 K/UL — SIGNIFICANT CHANGE UP (ref 0–0.5)
EOSINOPHIL NFR BLD AUTO: 0 % — SIGNIFICANT CHANGE UP (ref 0–6)
GLUCOSE SERPL-MCNC: 154 MG/DL — HIGH (ref 70–99)
HCT VFR BLD CALC: 37.9 % — LOW (ref 39–50)
HGB BLD-MCNC: 13 G/DL — SIGNIFICANT CHANGE UP (ref 13–17)
LYMPHOCYTES # BLD AUTO: 0.22 K/UL — LOW (ref 1–3.3)
LYMPHOCYTES # BLD AUTO: 3 % — LOW (ref 13–44)
MANUAL SMEAR VERIFICATION: SIGNIFICANT CHANGE UP
MCHC RBC-ENTMCNC: 30.3 PG — SIGNIFICANT CHANGE UP (ref 27–34)
MCHC RBC-ENTMCNC: 34.3 GM/DL — SIGNIFICANT CHANGE UP (ref 32–36)
MCV RBC AUTO: 88.3 FL — SIGNIFICANT CHANGE UP (ref 80–100)
MONOCYTES # BLD AUTO: 0 K/UL — SIGNIFICANT CHANGE UP (ref 0–0.9)
MONOCYTES NFR BLD AUTO: 0 % — LOW (ref 2–14)
NEUTROPHILS # BLD AUTO: 6.86 K/UL — SIGNIFICANT CHANGE UP (ref 1.8–7.4)
NEUTROPHILS NFR BLD AUTO: 94 % — HIGH (ref 43–77)
NRBC # BLD: 0 /100 — SIGNIFICANT CHANGE UP (ref 0–0)
PLAT MORPH BLD: NORMAL — SIGNIFICANT CHANGE UP
PLATELET # BLD AUTO: 110 K/UL — LOW (ref 150–400)
POTASSIUM SERPL-MCNC: 4.8 MMOL/L — SIGNIFICANT CHANGE UP (ref 3.5–5.3)
POTASSIUM SERPL-SCNC: 4.8 MMOL/L — SIGNIFICANT CHANGE UP (ref 3.5–5.3)
RBC # BLD: 4.29 M/UL — SIGNIFICANT CHANGE UP (ref 4.2–5.8)
RBC # FLD: 13.2 % — SIGNIFICANT CHANGE UP (ref 10.3–14.5)
RBC BLD AUTO: NORMAL — SIGNIFICANT CHANGE UP
SODIUM SERPL-SCNC: 137 MMOL/L — SIGNIFICANT CHANGE UP (ref 135–145)
VARIANT LYMPHS # BLD: 3 % — SIGNIFICANT CHANGE UP (ref 0–6)
WBC # BLD: 7.3 K/UL — SIGNIFICANT CHANGE UP (ref 3.8–10.5)
WBC # FLD AUTO: 7.3 K/UL — SIGNIFICANT CHANGE UP (ref 3.8–10.5)

## 2021-04-18 PROCEDURE — 99285 EMERGENCY DEPT VISIT HI MDM: CPT

## 2021-04-18 PROCEDURE — 76700 US EXAM ABDOM COMPLETE: CPT | Mod: 26

## 2021-04-18 RX ORDER — OXYCODONE HYDROCHLORIDE 5 MG/1
15 TABLET ORAL EVERY 6 HOURS
Refills: 0 | Status: DISCONTINUED | OUTPATIENT
Start: 2021-04-18 | End: 2021-04-19

## 2021-04-18 RX ORDER — ALBUTEROL 90 UG/1
2 AEROSOL, METERED ORAL EVERY 6 HOURS
Refills: 0 | Status: DISCONTINUED | OUTPATIENT
Start: 2021-04-18 | End: 2021-04-20

## 2021-04-18 RX ORDER — IPRATROPIUM/ALBUTEROL SULFATE 18-103MCG
3 AEROSOL WITH ADAPTER (GRAM) INHALATION ONCE
Refills: 0 | Status: DISCONTINUED | OUTPATIENT
Start: 2021-04-18 | End: 2021-04-18

## 2021-04-18 RX ORDER — ALBUTEROL 90 UG/1
2 AEROSOL, METERED ORAL EVERY 6 HOURS
Refills: 0 | Status: COMPLETED | OUTPATIENT
Start: 2021-04-18 | End: 2022-03-17

## 2021-04-18 RX ADMIN — OXYCODONE HYDROCHLORIDE 15 MILLIGRAM(S): 5 TABLET ORAL at 18:14

## 2021-04-18 RX ADMIN — ALBUTEROL 2 PUFF(S): 90 AEROSOL, METERED ORAL at 16:05

## 2021-04-18 RX ADMIN — OXYCODONE HYDROCHLORIDE 15 MILLIGRAM(S): 5 TABLET ORAL at 19:14

## 2021-04-18 RX ADMIN — TIOTROPIUM BROMIDE 1 CAPSULE(S): 18 CAPSULE ORAL; RESPIRATORY (INHALATION) at 10:00

## 2021-04-18 RX ADMIN — BUDESONIDE AND FORMOTEROL FUMARATE DIHYDRATE 2 PUFF(S): 160; 4.5 AEROSOL RESPIRATORY (INHALATION) at 21:09

## 2021-04-18 RX ADMIN — Medication 1 SPRAY(S): at 11:53

## 2021-04-18 RX ADMIN — Medication 40 MILLIGRAM(S): at 05:18

## 2021-04-18 RX ADMIN — OXYCODONE HYDROCHLORIDE 15 MILLIGRAM(S): 5 TABLET ORAL at 11:50

## 2021-04-18 RX ADMIN — Medication 1 SPRAY(S): at 18:15

## 2021-04-18 RX ADMIN — OXYCODONE HYDROCHLORIDE 5 MILLIGRAM(S): 5 TABLET ORAL at 05:45

## 2021-04-18 RX ADMIN — OXYCODONE HYDROCHLORIDE 15 MILLIGRAM(S): 5 TABLET ORAL at 12:50

## 2021-04-18 RX ADMIN — BUDESONIDE AND FORMOTEROL FUMARATE DIHYDRATE 2 PUFF(S): 160; 4.5 AEROSOL RESPIRATORY (INHALATION) at 10:01

## 2021-04-18 RX ADMIN — AMLODIPINE BESYLATE 10 MILLIGRAM(S): 2.5 TABLET ORAL at 05:18

## 2021-04-18 RX ADMIN — Medication 1 TABLET(S): at 11:52

## 2021-04-18 RX ADMIN — ENOXAPARIN SODIUM 40 MILLIGRAM(S): 100 INJECTION SUBCUTANEOUS at 21:24

## 2021-04-18 RX ADMIN — OXYCODONE HYDROCHLORIDE 5 MILLIGRAM(S): 5 TABLET ORAL at 05:17

## 2021-04-18 RX ADMIN — ALBUTEROL 2 PUFF(S): 90 AEROSOL, METERED ORAL at 21:08

## 2021-04-18 NOTE — PROGRESS NOTE ADULT - ATTENDING COMMENTS
Seen and examined. Chart reviewed.   patient is improving clinically.   Agree with above a/p  Edited where ever is necessary    main concern now is pain and pain meds. breathing getting better. 94-95% with 2L. home dose.  adjusted pain meds.  multiple compression fracture causing radicular pain to back and abd. abd sono limited but no acute cholecystitis  pulm cx noted. will continue current rx.   risk/benefit/alt of opioids discussed. advised not to drive after taking opioids like oxy/methadone. needs to follow up pain management  rest as above

## 2021-04-19 ENCOUNTER — TRANSCRIPTION ENCOUNTER (OUTPATIENT)
Age: 76
End: 2021-04-19

## 2021-04-19 ENCOUNTER — NON-APPOINTMENT (OUTPATIENT)
Age: 76
End: 2021-04-19

## 2021-04-19 VITALS
HEART RATE: 80 BPM | DIASTOLIC BLOOD PRESSURE: 100 MMHG | RESPIRATION RATE: 17 BRPM | OXYGEN SATURATION: 94 % | SYSTOLIC BLOOD PRESSURE: 137 MMHG | TEMPERATURE: 98 F

## 2021-04-19 LAB
A1C WITH ESTIMATED AVERAGE GLUCOSE RESULT: 6.2 % — HIGH (ref 4–5.6)
ANION GAP SERPL CALC-SCNC: 7 MMOL/L — SIGNIFICANT CHANGE UP (ref 5–17)
BUN SERPL-MCNC: 48 MG/DL — HIGH (ref 7–23)
CALCIUM SERPL-MCNC: 8.4 MG/DL — SIGNIFICANT CHANGE UP (ref 8.4–10.5)
CHLORIDE SERPL-SCNC: 102 MMOL/L — SIGNIFICANT CHANGE UP (ref 96–108)
CHOLEST SERPL-MCNC: 204 MG/DL — HIGH
CO2 SERPL-SCNC: 28 MMOL/L — SIGNIFICANT CHANGE UP (ref 22–31)
CREAT SERPL-MCNC: 1.13 MG/DL — SIGNIFICANT CHANGE UP (ref 0.5–1.3)
ESTIMATED AVERAGE GLUCOSE: 131 MG/DL — HIGH (ref 68–114)
GLUCOSE SERPL-MCNC: 120 MG/DL — HIGH (ref 70–99)
HDLC SERPL-MCNC: 85 MG/DL — SIGNIFICANT CHANGE UP
LIPID PNL WITH DIRECT LDL SERPL: 95 MG/DL — SIGNIFICANT CHANGE UP
MAGNESIUM SERPL-MCNC: 2.3 MG/DL — SIGNIFICANT CHANGE UP (ref 1.6–2.6)
NON HDL CHOLESTEROL: 118 MG/DL — SIGNIFICANT CHANGE UP
POTASSIUM SERPL-MCNC: 4.9 MMOL/L — SIGNIFICANT CHANGE UP (ref 3.5–5.3)
POTASSIUM SERPL-SCNC: 4.9 MMOL/L — SIGNIFICANT CHANGE UP (ref 3.5–5.3)
SODIUM SERPL-SCNC: 137 MMOL/L — SIGNIFICANT CHANGE UP (ref 135–145)
TRIGL SERPL-MCNC: 116 MG/DL — SIGNIFICANT CHANGE UP
TSH SERPL-MCNC: 1.77 UIU/ML — SIGNIFICANT CHANGE UP (ref 0.27–4.2)
VIT D25+D1,25 OH+D1,25 PNL SERPL-MCNC: 56.2 PG/ML — SIGNIFICANT CHANGE UP (ref 19.9–79.3)

## 2021-04-19 PROCEDURE — G0378: CPT

## 2021-04-19 PROCEDURE — 82803 BLOOD GASES ANY COMBINATION: CPT

## 2021-04-19 PROCEDURE — 82652 VIT D 1 25-DIHYDROXY: CPT

## 2021-04-19 PROCEDURE — 99285 EMERGENCY DEPT VISIT HI MDM: CPT | Mod: 25

## 2021-04-19 PROCEDURE — 80048 BASIC METABOLIC PNL TOTAL CA: CPT

## 2021-04-19 PROCEDURE — 96374 THER/PROPH/DIAG INJ IV PUSH: CPT | Mod: XU

## 2021-04-19 PROCEDURE — 36415 COLL VENOUS BLD VENIPUNCTURE: CPT

## 2021-04-19 PROCEDURE — 84443 ASSAY THYROID STIM HORMONE: CPT

## 2021-04-19 PROCEDURE — 71275 CT ANGIOGRAPHY CHEST: CPT

## 2021-04-19 PROCEDURE — 83036 HEMOGLOBIN GLYCOSYLATED A1C: CPT

## 2021-04-19 PROCEDURE — 71045 X-RAY EXAM CHEST 1 VIEW: CPT

## 2021-04-19 PROCEDURE — 85610 PROTHROMBIN TIME: CPT

## 2021-04-19 PROCEDURE — 85730 THROMBOPLASTIN TIME PARTIAL: CPT

## 2021-04-19 PROCEDURE — 83735 ASSAY OF MAGNESIUM: CPT

## 2021-04-19 PROCEDURE — 84484 ASSAY OF TROPONIN QUANT: CPT

## 2021-04-19 PROCEDURE — 96376 TX/PRO/DX INJ SAME DRUG ADON: CPT | Mod: XU

## 2021-04-19 PROCEDURE — 36600 WITHDRAWAL OF ARTERIAL BLOOD: CPT

## 2021-04-19 PROCEDURE — 87635 SARS-COV-2 COVID-19 AMP PRB: CPT

## 2021-04-19 PROCEDURE — 96375 TX/PRO/DX INJ NEW DRUG ADDON: CPT | Mod: XU

## 2021-04-19 PROCEDURE — 80053 COMPREHEN METABOLIC PANEL: CPT

## 2021-04-19 PROCEDURE — 93005 ELECTROCARDIOGRAM TRACING: CPT

## 2021-04-19 PROCEDURE — 83690 ASSAY OF LIPASE: CPT

## 2021-04-19 PROCEDURE — 76700 US EXAM ABDOM COMPLETE: CPT

## 2021-04-19 PROCEDURE — 85025 COMPLETE CBC W/AUTO DIFF WBC: CPT

## 2021-04-19 PROCEDURE — 80061 LIPID PANEL: CPT

## 2021-04-19 PROCEDURE — 94640 AIRWAY INHALATION TREATMENT: CPT

## 2021-04-19 RX ORDER — FLUTICASONE PROPIONATE 50 MCG
1 SPRAY, SUSPENSION NASAL
Qty: 30 | Refills: 0
Start: 2021-04-19 | End: 2021-05-03

## 2021-04-19 RX ORDER — SODIUM CHLORIDE 0.65 %
1 AEROSOL, SPRAY (ML) NASAL
Qty: 60 | Refills: 0
Start: 2021-04-19 | End: 2021-05-03

## 2021-04-19 RX ADMIN — ALBUTEROL 2 PUFF(S): 90 AEROSOL, METERED ORAL at 08:45

## 2021-04-19 RX ADMIN — OXYCODONE HYDROCHLORIDE 15 MILLIGRAM(S): 5 TABLET ORAL at 06:00

## 2021-04-19 RX ADMIN — OXYCODONE HYDROCHLORIDE 15 MILLIGRAM(S): 5 TABLET ORAL at 07:00

## 2021-04-19 RX ADMIN — OXYCODONE HYDROCHLORIDE 15 MILLIGRAM(S): 5 TABLET ORAL at 01:03

## 2021-04-19 RX ADMIN — BUDESONIDE AND FORMOTEROL FUMARATE DIHYDRATE 2 PUFF(S): 160; 4.5 AEROSOL RESPIRATORY (INHALATION) at 08:51

## 2021-04-19 RX ADMIN — Medication 1 SPRAY(S): at 06:03

## 2021-04-19 RX ADMIN — TIOTROPIUM BROMIDE 1 CAPSULE(S): 18 CAPSULE ORAL; RESPIRATORY (INHALATION) at 08:52

## 2021-04-19 RX ADMIN — Medication 1 SPRAY(S): at 00:04

## 2021-04-19 RX ADMIN — Medication 40 MILLIGRAM(S): at 06:01

## 2021-04-19 RX ADMIN — ALBUTEROL 2 PUFF(S): 90 AEROSOL, METERED ORAL at 04:48

## 2021-04-19 RX ADMIN — OXYCODONE HYDROCHLORIDE 15 MILLIGRAM(S): 5 TABLET ORAL at 00:03

## 2021-04-19 RX ADMIN — AMLODIPINE BESYLATE 10 MILLIGRAM(S): 2.5 TABLET ORAL at 06:02

## 2021-04-19 NOTE — DISCHARGE NOTE PROVIDER - HOSPITAL COURSE
76 yo M with PMH significant for severe past tobacco abuse, COPD on 2 L home O2, HTN, chronic back pain, and aggressive metastatic small cell lung ca on new chemotherapy, who presents from home because of abdominal pain, admitted because of worsening shortness of breath and acute on chronic hypoxic respiratory failure. CTA chest showing near occlusion of MIRELLA bronchus w/ inc in MIRELLA soft tissue mass. Admitted for acute on chronic hypoxic respiratory failure likely secondary to progressive small cell lung cancer and left bronchial occlusion. Seen by pulmonology given albuterol added symbicort continue home Spiriva.  Home prednisone 10 mg qd. increased to 40mg Prednisone. Will taper every 3 days to 10mg q daily. Started on Augmentin for sinusitis. Back and previous abdominal pain likely secondary to Cancer/compression fx.   # Thoracic vertebra compression fracture  Pain has back pain, radiating to front, w/ chief complaint of RUQ abd pain.  Abdomen benign without any guarding or tenderness on palpation. LFTs wnl.  CTA showing T7-T8 stable , new fx at T9.   - abdominal U/S ordered. If worsens recommend CT abdomen/pelvis.    - f/u vitamin D level    # Acute hyponatremia  - resolved  -possibly due to dehydration  - monitor BMP    # HTN  - cont home amlodipine 10 mg qd    # Hx of severe tobacco abuse  Quit 1 year ago at cancer dx, smoked 1 ppd x30 years        Source of Infection:  Antibiotic / Last Day:    Palliative Care / Advanced Care Planning  Code Status:  Patient/Family agreeable to Hospice/Palliative (Y/N)?  Summary of Goals of Care Conversation:    Discharging Provider:  Flaco Kilpatrick NP  Contact Info: [phone]. Please call with any questions or concerns.    Outpatient Provider:    Signout given to  SNF Provider:       74 yo M with PMH significant for severe past tobacco abuse, COPD on 2 L home O2, HTN, chronic back pain, and aggressive metastatic small cell lung ca on new chemotherapy, who presents from home because of abdominal pain, admitted because of worsening shortness of breath and acute on chronic hypoxic respiratory failure. CTA chest showing near occlusion of MIRELLA bronchus w/ inc in MIRELLA soft tissue mass. Admitted for acute on chronic hypoxic respiratory failure likely secondary to progressive small cell lung cancer and left bronchial occlusion. Seen by pulmonology given albuterol, added symbicort, continue home Spiriva.  Home prednisone 10 mg qd. increased to 40mg Prednisone. Will taper 10mg every 3 days. Started on Augmentin for sinusitis last dose 4/23. Back and previous abdominal pain likely secondary to Cancer/compression fx T9. Takes methadone which is trying to be weaned and oxycodone q 6 hours at home. Abdominal US negative for acute pathology. Patient will follow up with oncology and pulmonology.           Source of Infection: Sinusitis   Antibiotic / Last Day: 4/23/21    Palliative Care / Advanced Care Planning  Code Status:  Patient/Family agreeable to Hospice/Palliative (Y/N)?  Summary of Goals of Care Conversation:    Discharging Provider:  Flaco Kilpatrick NP  Contact Info: [phone]. Please call with any questions or concerns.    Outpatient Provider:    Signout given to  SNF Provider:       76 yo M with PMH significant for severe past tobacco abuse, COPD on 2 L home O2, HTN, chronic back pain, and aggressive metastatic small cell lung ca on new chemotherapy, who presents from home because of abdominal pain, admitted because of worsening shortness of breath and acute on chronic hypoxic respiratory failure. CTA chest showing near occlusion of MIRELLA bronchus w/ inc in MIRELLA soft tissue mass. Admitted for acute on chronic hypoxic respiratory failure likely secondary to progressive small cell lung cancer and left bronchial occlusion. Seen by pulmonology given albuterol, added symbicort, continue home Spiriva.  Home prednisone 10 mg qd. increased to 40mg Prednisone. Will taper 10mg every 3 days. Started on Augmentin for sinusitis last dose 4/23. Back and previous abdominal pain likely secondary to Cancer/compression fx T9. Takes methadone which is trying to be weaned and oxycodone q 6 hours at home. Abdominal US negative for acute pathology. Patient will follow up with oncology and pulmonology.           Source of Infection: Sinusitis   Antibiotic / Last Day: 4/23/21    Palliative Care / Advanced Care Planning  Code Status: DNR   Patient/Family agreeable to Hospice/Palliative (Y/N)?  Summary of Goals of Care Conversation:    Discharging Provider:  Flaco Kilpatrick NP  Contact Info: [phone]. Please call with any questions or concerns.    Outpatient Provider: Casey Zavala           76 yo M with PMH significant for severe past tobacco abuse, COPD on 2 L home O2, HTN, chronic back pain, and aggressive metastatic small cell lung ca on new chemotherapy, who presents from home because of abdominal pain, admitted because of worsening shortness of breath and acute on chronic hypoxic respiratory failure. CTA chest showing near occlusion of MIRELLA bronchus w/ inc in MIRELLA soft tissue mass. Admitted for acute on chronic hypoxic respiratory failure likely secondary to progressive small cell lung cancer and left bronchial occlusion. Seen by pulmonology given albuterol, added symbicort, continue home Spiriva.  Home prednisone 10 mg qd. increased to 40mg Prednisone. Will taper 10mg every 3 days. Started on Augmentin for sinusitis last dose 4/23. Back and previous abdominal pain likely secondary to Cancer/compression fx T9. Takes methadone which is trying to be weaned and oxycodone q 6 hours at home. Abdominal US negative for acute pathology. Patient will follow up with oncology and pulmonology.           Source of Infection: Sinusitis   Antibiotic / Last Day: 4/23/21    Palliative Care / Advanced Care Planning  Code Status: DNR   Patient/Family agreeable to Hospice/Palliative (Y/N)?  Summary of Goals of Care Conversation:    Discharging Provider:  Flaco Kilpatrick NP  Contact Info: [phone]. Please call with any questions or concerns.    Outpatient Provider: Casey Zavala    Time spent 45 min

## 2021-04-19 NOTE — PROGRESS NOTE ADULT - ASSESSMENT
76 yo M with PMH significant for severe past tobacco abuse, COPD on 2 L home O2, HTN, chronic back pain, and aggressive metastatic small cell lung ca on new chemotherapy, who presents from home because of abdominal pain, admitted because of worsening shortness of breath and acute on chronic hypoxic respiratory failure. CTA chest showing near occlusion of MIRELLA bronchus w/ inc in MIRELLA soft tissue mass.     # Acute on chronic hypoxic respiratory failure likely secondary to Progressive small cell lung cancer and left bronchial occlusion ,   # Emphysema exacerbation in setting of advanced emphysema. Uses 2L oxygen at home   # Increased shortness of breath: currently at baseline as per patient  CTA chest w/ advanced emphysema, left upper bronchial marked narrowing. Increase in size of RLL lung mass. Pulmonary consulted to see if any intervention required.   - Normally on chronic prednisone 10 mg qd. S/p 125 mg methylprednisolone in ER.   - Started 40 mg prednisone. Taper every 3 days to 10 mg qd.   - sputum culture pending  - scheduled MDI   - cont home spiriva qd. Started on symbicort by Pulm.   - continuous pulse ox.   - Case previously discussed with w/ patient's Oncologist, Dr. Montemayor. Discussed case with Pulmonary, Dr. Hopkins, who believes pt's sx due to sinusitis started on Augmentin yesterday     # Shortness of breath possibly due to Sinusitis/post nasal drip  - Seen by Pulm, who suspects cough/SOB is due to sinusitis and post nasal drip.   - started on flonase and ocean nasal spray.   - humidified air by nasal cannula   - Augmentin for sinusitis.     # Cancer related pain  - being weaned off of methadone, last filled 30 day supply in October 2020.  - Pt states he normally takes 15mg Oxy q 6 hours. Increased oxycodone from 5mg to 15 mg q6h PRN , 90mg for 23 days previously filled by [facility]   Istop Reference#: [id number]. as per clinical pharmacist, he has enough remaining for next 2-3 days when he will see his oncologist who prescribe the med. he is going to see pain management on may 20.     # Back and previous abdominal pain likely secondary to Cancer/compression fx   # Thoracic vertebra compression fracture  Pain has back pain, radiating to front, w/ chief complaint of RUQ abd pain.  Abdomen benign without any guarding or tenderness on palpation. LFTs wnl.  CTA showing T7-T8 stable , new fx at T9.   - abdominal U/S ordered. If worsens recommend CT abdomen/pelvis.    - f/u vitamin D level  - as above    # Acute hyponatremia  - resolved  -possibly due to dehydration  - monitor BMP    # HTN  - cont home amlodipine 10 mg qd    # Hx of severe tobacco abuse  Quit 1 year ago at cancer dx, smoked 1 ppd x30 years  counselled to stay away from cig    DVT ppx: Lovenox  DNR / DNI, MOLST in chart.   Dispo: check ambulatory o2 sat. patient wants to leave asap for home emergency. stable  
Physical Examination:  GENERAL:               Alert, Oriented, No acute distress.    HEENT:                  Right frontal sinus tenderness  No JVD, Moist MM   PULM:                     Bilateral air entry, upper airway transmitted sounds to auscultation bilaterally with episodes of wheezing and rales, no significant sputum production, + wheezing  No gross pulmonary Rales, Rhonchi  CVS:                         S1, S2,  No Murmur  ABD:                        Soft, nondistended, nontender, normoactive bowel sounds,   EXT:                         mild edema, nontender, No Cyanosis or Clubbing   NEURO:                  Alert, oriented, interactive, nonfocal, follows commands  PSYC:                      Calm, + Insight.        Assessment and Recommendation:   · Assessment	  Assessment  1. Worsening Cough/SOB - suspect from Sinusitis / PND  2. CT Showing : There is marked narrowing and near occlusion of the left upper lobe bronchial anatomy, progressive from prior evaluation.   There is interval increase in soft tissue masses identified within the left thorax. Interval increase in size of a soft tissue mass within the left upper lobe now measuring 8.7 x 5.3 cm with mediastinal invasion and encasement of the left upper lobe arterial anatomy. Additional masses within the left upper lobe demonstrate interval increase in size. AND There is interval increase in size of a right lower lobe lung mass currently measuring 6.7 x 3.6 cm.  3. Underlying Small Cell Lung cancer on chemo  4. Severe COPD on home o2      Plan  Prednisone 40mg PO Daily, taper 10 mg every 3 days till can see Primary pulm MD  Augmentin x 10 days  Flonase BID  Nasal saline spray  Humidify o2  f/u sputum culture  Continue home Spiriva / Symbicort  albuterol prn     will need out patient f/u with oncology for PET and to discuss chemo therapy.  Consider out patient ENT f/u can be considered   
74 yo M with PMH significant for severe past tobacco abuse, COPD on 2 L home O2, HTN, chronic back pain, and aggressive metastatic small cell lung ca on new chemotherapy, who presents from home because of abdominal pain, admitted because of worsening shortness of breath and acute on chronic hypoxic respiratory failure. CTA chest showing near occlusion of MIRELLA bronchus w/ inc in MIRELLA soft tissue mass.     spO2 94% on 4L     # Acute on chronic hypoxic respiratory failure likely secondary to Progressive small cell lung cancer and left bronchial occlusion   # Emphysema exacerbation in setting of advanced emphysema. Uses 2L oxygen at home   # Increased shortness of breath  CTA chest w/ advanced emphysema, left upper bronchial marked narrowing. Increase in size of RLL lung mass. Pulmonary consulted to see if any intervention required.   - Normally on chronic prednisone 10 mg qd. S/p 125 mg methylprednisolone in ER.   - Start 40 mg prednisone tomorrow. Taper every 3 days to 10 mg qd.   - sputum culture  - scheduled MDI   - cont home spiriva qd. Started on symbicort by Pulm.   - continuous pulse ox.   - Case previously discussed with w/ patient's Oncologist, Dr. Montemayor. Discussed case with Pulmonary, Dr. Hopkins, who believes pt's sx due to sinusitis started on Augmentin yesterday     # Shortness of breath possibly due to Sinusitis/post nasal drip  - Seen by Pulm, who suspects cough/SOB is due to sinusitis and post nasal drip.   - started on flonase and ocean nasal spray.  - humidified air by nasal cannula   - Augmentin for sinusitis.     # Cancer related pain  - being weaned off of methadone, last filled 30 day supply in October 2020.  - Pt states he normally takes 15mg Oxy q 6 hours. Increased oxycodone from 5mg to 15 mg q6h PRN , 90mg for 23 days previously filled by [facility].   Istop Reference#: [id number]    # Back and previous abdominal pain likely secondary to Cancer/compression fx   # Thoracic vertebra compression fracture  Pain has back pain, radiating to front, w/ chief complaint of RUQ abd pain.  Abdomen benign without any guarding or tenderness on palpation. LFTs wnl.  CTA showing T7-T8 stable , new fx at T9.   - abdominal U/S ordered. If worsens recommend CT abdomen/pelvis.    - f/u vitamin D level    # Acute hyponatremia  - resolved  -possibly due to dehydration  - monitor BMP    # HTN  - cont home amlodipine 10 mg qd    # Hx of severe tobacco abuse  Quit 1 year ago at cancer dx, smoked 1 ppd x30 years    DVT ppx: Lovenox  DNR / DNI, MOLST in chart.   med rec: performed with wife and , not totally clear on all meds.   Dispo: likely can d/c in AM, pending abd u/s.

## 2021-04-19 NOTE — DISCHARGE NOTE PROVIDER - CARE PROVIDER_API CALL
Casey Zavala  INTERNAL MEDICINE  [address], [address]  [place] [address]  Phone: [phone]  Fax: [phone]  Follow Up Time:     PHUONG PERES  Radiation Oncology  [address]  [place] [address]  Phone: [phone]  Fax: [phone]  Follow Up Time:

## 2021-04-19 NOTE — DISCHARGE NOTE NURSING/CASE MANAGEMENT/SOCIAL WORK - PATIENT PORTAL LINK FT
You can access the FollowMyHealth Patient Portal offered by [facility] by registering at the following website: [email or web address]. By joining [employer]’s FollowMyHealth portal, you will also be able to view your health information using other applications (apps) compatible with our system.

## 2021-04-19 NOTE — DISCHARGE NOTE PROVIDER - CARE PROVIDERS DIRECT ADDRESSES
[email or web address]

## 2021-04-19 NOTE — PROGRESS NOTE ADULT - SUBJECTIVE AND OBJECTIVE BOX
Patient is a 75y old  Male who presents with a chief complaint of shortness of breath    Patient seen and examined at bedside. States he has pain in his back and intermittent difficulty breathing. + productive cough in am. Denies chest pain, dizziness, fever, chills or other complaints.     ALLERGIES:  No Known Allergies    MEDICATIONS  (STANDING):  amLODIPine   Tablet 10 milliGRAM(s) Oral daily  amoxicillin  875 milliGRAM(s)/clavulanate 1 Tablet(s) Oral daily  budesonide 160 MICROgram(s)/formoterol 4.5 MICROgram(s) Inhaler 2 Puff(s) Inhalation two times a day  enoxaparin Injectable 40 milliGRAM(s) SubCutaneous at bedtime  fluticasone propionate 50 MICROgram(s)/spray Nasal Spray 1 Spray(s) Both Nostrils two times a day  predniSONE   Tablet 40 milliGRAM(s) Oral daily  sodium chloride 0.65% Nasal 1 Spray(s) Both Nostrils every 6 hours  tiotropium 18 MICROgram(s) Capsule 1 Capsule(s) Inhalation daily    MEDICATIONS  (PRN):  acetaminophen   Tablet .. 650 milliGRAM(s) Oral every 4 hours PRN Mild Pain (1 - 3)  acetaminophen   Tablet .. 975 milliGRAM(s) Oral every 6 hours PRN Moderate Pain (4 - 6)  ALBUTerol    90 MICROgram(s) HFA Inhaler 2 Puff(s) Inhalation every 6 hours PRN Shortness of Breath and/or Wheezing  ondansetron Injectable 4 milliGRAM(s) IV Push every 6 hours PRN Nausea  oxyCODONE    IR 15 milliGRAM(s) Oral every 6 hours PRN Severe Pain (7 - 10)    Vital Signs Last 24 Hrs  T(F): 98.2 (18 Apr 2021 05:00), Max: 98.5 (17 Apr 2021 12:45)  HR: 83 (18 Apr 2021 05:00) (67 - 83)  BP: 134/75 (18 Apr 2021 05:00) (113/74 - 150/90)  RR: 17 (18 Apr 2021 05:00) (13 - 17)  SpO2: 94% (18 Apr 2021 05:00) (94% - 100%)  I&O's Summary    PHYSICAL EXAM:  General: NAD, A/O x 3  ENT: MMM, no oral thrush   Neck: Supple, No JVD  Lungs: wheezing bilaterally, non labored breathing  Cardio: RRR, S1/S2, No murmurs  Abdomen: Soft, Nontender, Nondistended; Bowel sounds present  Extremities: No calf tenderness, No pitting edema    LABS:                        13.0   7.30  )-----------( 110      ( 18 Apr 2021 05:45 )             37.9     04-18    137  |  102  |  40  ----------------------------<  154  4.8   |  26  |  1.08    Ca    8.9      18 Apr 2021 05:45    TPro  6.5  /  Alb  3.4  /  TBili  0.7  /  DBili  x   /  AST  34  /  ALT  42  /  AlkPhos  39  04-17    eGFR if Non African American: 68 mL/min/1.73M2 (04-18-21 @ 05:45)  eGFR if African American: 78 mL/min/1.73M2 (04-18-21 @ 05:45)    PT/INR - ( 17 Apr 2021 06:50 )   PT: 11.4 sec;   INR: 0.94 ratio         PTT - ( 17 Apr 2021 06:50 )  PTT:24.4 sec    CARDIAC MARKERS ( 17 Apr 2021 06:50 )  <.017 ng/mL / x     / x     / x     / x                ABG - ( 17 Apr 2021 09:58 )  pH, Arterial: 7.37  pH, Blood: x     /  pCO2: 42    /  pO2: 95    / HCO3: x     / Base Excess: x     /  SaO2: 96                                RADIOLOGY & ADDITIONAL TESTS:    < from: CT Angio Chest w/ IV Cont (04.17.21 @ 08:51) >  IMPRESSION:  1. Respiratory motion at the time of image acquisition limits assessment for pulmonary embolism. No filling defects are identified within the main pulmonary arteries or lobar arterial branches. The segmental and subsegmental arterial branches are unable to be assessed.  2. There is marked narrowing and near occlusion of the left upper lobe bronchial anatomy, progressive from prior evaluation.   There is interval increase in soft tissue masses identified within the left thorax. Interval increase in size of a soft tissue mass within the left upper lobe now measuring 8.7 x 5.3 cm with mediastinal invasion and encasement of the left upper lobe arterial anatomy. Additional masses within the left upper lobe demonstrate interval increase in size. There is interval increase in size of a right lower lobe lung mass currently measuring 6.7 x 3.6 cm.  3. Progressive mediastinal lymphadenopathy.  4. Stable compression fracture deformity of T7 and T8 with new compression fracture deformity of T9.              XIANG JOYNER MD; Attending Radiologist  This document has been electronically signed. Apr 17 2021  9:50AM    < end of copied text >    Care Discussed with Consultants/Other Providers:   
Medicine Progress Note    Patient is a 75y old  Male who presents with a chief complaint of shortness of breath. (19 Apr 2021 09:53)      SUBJECTIVE / OVERNIGHT EVENTS:  SOB much better and at baseline  still have back pain going to the front. controlled with oxy. has enough oxy at bottle to continue for few days. His wife is trying to get appointment with his oncologist tomorrow.   wants to leave before 12. hs some emergency at home    ADDITIONAL REVIEW OF SYSTEMS:  no fever/chills/CP/palpitation/dizziness/ abd pain/nausea/vomiting/diarrhea/constipation/headaches/limb weakness. all other ROS neg    MEDICATIONS  (STANDING):  ALBUTerol    90 MICROgram(s) HFA Inhaler 2 Puff(s) Inhalation every 6 hours  amLODIPine   Tablet 10 milliGRAM(s) Oral daily  amoxicillin  875 milliGRAM(s)/clavulanate 1 Tablet(s) Oral daily  budesonide 160 MICROgram(s)/formoterol 4.5 MICROgram(s) Inhaler 2 Puff(s) Inhalation two times a day  enoxaparin Injectable 40 milliGRAM(s) SubCutaneous at bedtime  fluticasone propionate 50 MICROgram(s)/spray Nasal Spray 1 Spray(s) Both Nostrils two times a day  predniSONE   Tablet 40 milliGRAM(s) Oral daily  sodium chloride 0.65% Nasal 1 Spray(s) Both Nostrils every 6 hours  tiotropium 18 MICROgram(s) Capsule 1 Capsule(s) Inhalation daily    MEDICATIONS  (PRN):  acetaminophen   Tablet .. 650 milliGRAM(s) Oral every 4 hours PRN Mild Pain (1 - 3)  acetaminophen   Tablet .. 975 milliGRAM(s) Oral every 6 hours PRN Moderate Pain (4 - 6)  ondansetron Injectable 4 milliGRAM(s) IV Push every 6 hours PRN Nausea  oxyCODONE    IR 15 milliGRAM(s) Oral every 6 hours PRN Severe Pain (7 - 10)    CAPILLARY BLOOD GLUCOSE        I&O's Summary    18 Apr 2021 07:01  -  19 Apr 2021 07:00  --------------------------------------------------------  IN: 300 mL / OUT: 2 mL / NET: 298 mL        PHYSICAL EXAM:  Vital Signs Last 24 Hrs  T(C): 36.7 (19 Apr 2021 10:12), Max: 36.9 (19 Apr 2021 05:00)  T(F): 98.1 (19 Apr 2021 10:12), Max: 98.5 (19 Apr 2021 05:00)  HR: 80 (19 Apr 2021 10:12) (77 - 81)  BP: 137/100 (19 Apr 2021 10:12) (117/77 - 137/100)  BP(mean): --  RR: 17 (19 Apr 2021 10:12) (15 - 17)  SpO2: 94% (19 Apr 2021 10:12) (94% - 98%)      GENERAL: Not in distress. Alert    HEENT:  MMM  NECK: Supple.    CARDIOVASCULAR: RRR S1, S2. No murmur/rubs/gallop  LUNGS: BLAE reduced, no rales, + wheezing, no rhonchi. breathing not laboured   ABDOMEN: ND. Soft,  NT, no guarding / rebound / rigidity. BS normoactive. No CVA tenderness.    BACK: No spine tenderness. b/l paraspinal muscle TP  EXTREMITIES: no edema. no leg or calf TP.  SKIN: no rash.   NEUROLOGIC: AAO*3.strength is symmetric, sensation intact, speech fluent.    PSYCHIATRIC: Calm.  No agitation.    LABS:                        13.0   7.30  )-----------( 110      ( 18 Apr 2021 05:45 )             37.9     04-19    137  |  102  |  48<H>  ----------------------------<  120<H>  4.9   |  28  |  1.13    Ca    8.4      19 Apr 2021 08:08  Mg     2.3     04-19                COVID-19 PCR: NotDetec (17 Apr 2021 06:50)  COVID-19 PCR: NotDetec (06 Feb 2021 14:13)  SARS-CoV-2: NotDetec (07 Jan 2021 12:00)  COVID-19 PCR: NotDetec (06 Jan 2021 23:15)      RADIOLOGY & ADDITIONAL TESTS:  Imaging from Last 24 Hours:    Electrocardiogram/QTc Interval:    COORDINATION OF CARE:  Care Discussed with Consultants/Other Providers:  
Follow-up Pulmonary Progress Note  Chief Complaint :     patient seen and examined  comfortable  cough continues states more moist now and easier to bring up    + wheezing    Allergies :No Known Allergies      PAST MEDICAL & SURGICAL HISTORY:  Small cell carcinoma of lung    Hypertension, unspecified type    Multifocal pneumonia    COPD, mild    No significant past surgical history        Medications:  MEDICATIONS  (STANDING):  ALBUTerol    90 MICROgram(s) HFA Inhaler 2 Puff(s) Inhalation every 6 hours  amLODIPine   Tablet 10 milliGRAM(s) Oral daily  amoxicillin  875 milliGRAM(s)/clavulanate 1 Tablet(s) Oral daily  budesonide 160 MICROgram(s)/formoterol 4.5 MICROgram(s) Inhaler 2 Puff(s) Inhalation two times a day  enoxaparin Injectable 40 milliGRAM(s) SubCutaneous at bedtime  fluticasone propionate 50 MICROgram(s)/spray Nasal Spray 1 Spray(s) Both Nostrils two times a day  predniSONE   Tablet 40 milliGRAM(s) Oral daily  sodium chloride 0.65% Nasal 1 Spray(s) Both Nostrils every 6 hours  tiotropium 18 MICROgram(s) Capsule 1 Capsule(s) Inhalation daily    MEDICATIONS  (PRN):  acetaminophen   Tablet .. 650 milliGRAM(s) Oral every 4 hours PRN Mild Pain (1 - 3)  acetaminophen   Tablet .. 975 milliGRAM(s) Oral every 6 hours PRN Moderate Pain (4 - 6)  ondansetron Injectable 4 milliGRAM(s) IV Push every 6 hours PRN Nausea  oxyCODONE    IR 15 milliGRAM(s) Oral every 6 hours PRN Severe Pain (7 - 10)      LABS:                        13.0   7.30  )-----------( 110      ( 18 Apr 2021 05:45 )             37.9     04-18    137  |  102  |  40<H>  ----------------------------<  154<H>  4.8   |  26  |  1.08    Ca    8.9      18 Apr 2021 05:45    TPro  6.5  /  Alb  3.4  /  TBili  0.7  /  DBili  x   /  AST  34  /  ALT  42  /  AlkPhos  39<L>  04-17      CARDIAC MARKERS ( 17 Apr 2021 06:50 )  <.017 ng/mL / x     / x     / x     / x            PT/INR - ( 17 Apr 2021 06:50 )   PT: 11.4 sec;   INR: 0.94 ratio         PTT - ( 17 Apr 2021 06:50 )  PTT:24.4 sec            VITALS:  T(C): 36.8 (04-18-21 @ 05:00), Max: 36.9 (04-17-21 @ 19:28)  T(F): 98.2 (04-18-21 @ 05:00), Max: 98.4 (04-17-21 @ 19:28)  HR: 83 (04-18-21 @ 05:00) (67 - 83)  BP: 134/75 (04-18-21 @ 05:00) (113/74 - 135/75)  BP(mean): --  ABP: --  ABP(mean): --  RR: 17 (04-18-21 @ 05:00) (16 - 17)  SpO2: 97% (04-18-21 @ 10:00) (94% - 100%)  CVP(mm Hg): --  CVP(cm H2O): --    Ins and Outs     04-18-21 @ 07:01  -  04-18-21 @ 14:58  --------------------------------------------------------  IN: 100 mL / OUT: 2 mL / NET: 98 mL        Height (cm): 180.3 (04-17-21 @ 16:29)  Weight (kg): 97.6 (04-17-21 @ 16:29)  BMI (kg/m2): 30 (04-17-21 @ 16:29)        I&O's Detail    18 Apr 2021 07:01  -  18 Apr 2021 14:58  --------------------------------------------------------  IN:    Oral Fluid: 100 mL  Total IN: 100 mL    OUT:    Voided (mL): 2 mL  Total OUT: 2 mL    Total NET: 98 mL

## 2021-04-19 NOTE — DISCHARGE NOTE PROVIDER - NSDCMRMEDTOKEN_GEN_ALL_CORE_FT
Albuterol (Eqv-ProAir HFA) 90 mcg/inh inhalation aerosol: 2 puff(s) inhaled every 6 hours  amLODIPine 10 mg oral tablet: 1 tab(s) orally once a day  methadone 10 mg oral tablet: 1.5 tab(s) orally once a day  predniSONE 10 mg oral tablet: 1 tab(s) orally once a day  Spiriva Respimat 1.25 mcg/inh inhalation aerosol: 2 puff(s) inhaled once a day  Symbicort 160 mcg-4.5 mcg/inh inhalation aerosol: 2 puff(s) inhaled 2 times a day  Zofran 8 mg oral tablet: 1 tab(s) orally 3 times a day, As Needed   Albuterol (Eqv-ProAir HFA) 90 mcg/inh inhalation aerosol: 2 puff(s) inhaled every 6 hours  amLODIPine 10 mg oral tablet: 1 tab(s) orally once a day  Augmentin 875 mg-125 mg oral tablet: 1 tab(s) orally every 12 hours for Sinusitis   Last day 4/23  Flonase 50 mcg/inh nasal spray: 1 spray(s) nasal 2 times a day  methadone 10 mg oral tablet: 1.5 tab(s) orally once a day  Ocean 0.65% nasal spray: 1 spray(s) nasal every 6 hours   predniSONE 10 mg oral tablet: 3 tab(s) orally once a day x 3 days  2 tab(s) orally once a day x 3 days    Spiriva Respimat 1.25 mcg/inh inhalation aerosol: 2 puff(s) inhaled once a day  Symbicort 160 mcg-4.5 mcg/inh inhalation aerosol: 2 puff(s) inhaled 2 times a day  Zofran 8 mg oral tablet: 1 tab(s) orally 3 times a day, As Needed

## 2021-04-19 NOTE — DISCHARGE NOTE PROVIDER - NSDCCPCAREPLAN_GEN_ALL_CORE_FT
PRINCIPAL DISCHARGE DIAGNOSIS  Diagnosis: COPD exacerbation  Assessment and Plan of Treatment: Your CT scan showed worsening Lung Mass. You were seen by the pulmonary team for shortness of breath, given nebulizers, breathing treatments and an increased steroid dose. The steroids will taper down starting at 30mg every 3 days to your regular dose of 10mg. You were started on antibiotics (Augmentin) for Sinusitis. Your last dose is 4/23. Your abdominal ultrasound is negative. You must follow up with your oncologist and pulmonologist this week. Feel better.      SECONDARY DISCHARGE DIAGNOSES  Diagnosis: Thoracic compression fracture  Assessment and Plan of Treatment: You have a new compression fracture at the T9 area which is likely related to your cancer.    Diagnosis: Lung cancer  Assessment and Plan of Treatment: See above     PRINCIPAL DISCHARGE DIAGNOSIS  Diagnosis: COPD exacerbation  Assessment and Plan of Treatment: Your CT scan showed worsening Lung Mass. You were seen by the pulmonary team for shortness of breath, given nebulizers, breathing treatments and an increased steroid dose. The steroids will taper down starting at 30mg every 3 days to your regular dose of 10mg. You were started on antibiotics (Augmentin) for Sinusitis. Your last dose is 4/23. Your abdominal ultrasound is negative. You must follow up with your oncologist and pulmonologist this week. Feel better. please check Oxygen saturation with pulse oximeter. return to ER if saturation <88% or worsening symptoms      SECONDARY DISCHARGE DIAGNOSES  Diagnosis: Thoracic compression fracture  Assessment and Plan of Treatment: You have a new compression fracture at the T9 area which is likely related to your cancer.    Diagnosis: Lung cancer  Assessment and Plan of Treatment: See above

## 2021-04-19 NOTE — DISCHARGE NOTE PROVIDER - NSDCFUSCHEDAPPT_GEN_ALL_CORE_FT
YANN FISHMAN ; 05/03/2021 ; [facility] [id number] YANN FISHMAN ; 04/26/2021 ; [facility] [phone]

## 2021-04-20 ENCOUNTER — NON-APPOINTMENT (OUTPATIENT)
Age: 76
End: 2021-04-20

## 2021-04-26 ENCOUNTER — APPOINTMENT (OUTPATIENT)
Dept: PULMONOLOGY | Facility: CLINIC | Age: 76
End: 2021-04-26

## 2021-04-28 ENCOUNTER — NON-APPOINTMENT (OUTPATIENT)
Age: 76
End: 2021-04-28

## 2021-05-06 RX ORDER — DIAZEPAM 5 MG/1
5 TABLET ORAL
Qty: 5 | Refills: 0 | Status: ACTIVE | COMMUNITY
Start: 2020-05-29 | End: 1900-01-01

## 2021-05-21 RX ORDER — AZITHROMYCIN 250 MG/1
250 TABLET, FILM COATED ORAL
Qty: 1 | Refills: 0 | Status: ACTIVE | COMMUNITY
Start: 2021-05-21 | End: 1900-01-01

## 2021-06-01 RX ORDER — PREDNISONE 10 MG/1
10 TABLET ORAL
Qty: 30 | Refills: 3 | Status: ACTIVE | COMMUNITY
Start: 2020-12-14 | End: 1900-01-01

## 2021-06-01 RX ORDER — DOXYCYCLINE HYCLATE 100 MG/1
100 CAPSULE ORAL
Qty: 14 | Refills: 0 | Status: ACTIVE | COMMUNITY
Start: 2021-06-01 | End: 1900-01-01

## 2021-06-02 ENCOUNTER — NON-APPOINTMENT (OUTPATIENT)
Age: 76
End: 2021-06-02

## 2021-06-04 ENCOUNTER — NON-APPOINTMENT (OUTPATIENT)
Age: 76
End: 2021-06-04

## 2021-09-09 NOTE — ED PROVIDER NOTE - COVID-19 ORDERING FACILITY
Infectious Disease Progress Note         Subjective:   Assessed pt at bedside. No acute overnight events per staff. More alert and responsive, but still slow to respond. Still w left sided weakness. Power line to placed for long term IV antibiotics. Intermittently febrile w Tmax of 101.0F, hypertensive. Has had any nutrition since . Objective:   Physical Exam:     Visit Vitals  BP (!) 154/72   Pulse 65   Temp (!) 96.7 °F (35.9 °C)   Resp 24   Ht 5' 6\" (1.676 m)   Wt 176 lb 5.9 oz (80 kg)   SpO2 97%   BMI 28.47 kg/m²    O2 Flow Rate (L/min): 2 l/min O2 Device: None (Room air)    Temp (24hrs), Av.9 °F (37.2 °C), Min:96.7 °F (35.9 °C), Max:101 °F (38.3 °C)    No intake/output data recorded.  1901 -  0700  In: 1900 [I.V.:1900]  Out: 600 [Urine:600]    General: NAD, more alert, responds to name   HEENT: LAQUITA, Moist mucosa  Lungs: CTA b/l, no wheeze/rhonchi   Heart: S1S2+, RRR, no murmur  Abdo: Soft, NT, ND, +BS   Exts: left side weakness, normal strength on right   Skin: No chronic wounds or ulcers     Data Review:       Recent Days:  Recent Labs     21  0330   WBC 7.6   HGB 14.7   HCT 44.5        Recent Labs     21  0330   BUN 17   CREA 1.60*       Lab Results   Component Value Date/Time    C-Reactive protein 4.70 (H) 2021 03:30 AM        Microbiology     Results     Procedure Component Value Units Date/Time    HSV 1 AND 2 BY PCR [[id number]] Collected: 21 1315    Order Status: Canceled Specimen: Other from Miscellaneous sample     HSV 1 AND 2 BY PCR [[id number]]  (Abnormal) Collected: 21 1300    Order Status: Completed Specimen: Other from Miscellaneous sample Updated: 21 1037     Source Cerebrospinal fluid        Comment: Corrected on  AT 1037: Previously reported as Blood        HSV-1 DNA by PCR Positive        HSV-2 DNA by PCR Negative        Comment:  This test was developed and its performance characteristics  determined by [facility]. It has not been cleared or  approved by the U.S. Food and Drug Administration. The FDA has  determined that such clearance or approval is not necessary. This  test is used for clinical purposes. It should not be regarded as  investigational or research. Performed At: [address] [id number]  Phyllis Brownlee MD [id number]         Estrellita Patel [[id number]] Collected: 09/03/21 1300    Order Status: Completed Specimen: Other from Miscellaneous sample Updated: 09/08/21 1338     Source Cerebrospinal fluid        Viral Culture, General Comment        Comment: Preliminary Report:  No virus isolated at 4 days. Next report to follow after 7 days. Performed At: [address] [id number]  Phyllis Brownlee MD QM:[id number]         CULTURE, BODY Bette Dense [[id number]] Collected: 09/03/21 1300    Order Status: Canceled Specimen: Body Fluid     CULTURE, CSF Valeria Ohs [[id number]] Collected: 09/03/21 1200    Order Status: Completed Specimen: Cerebrospinal Fluid Updated: 09/05/21 1038     Special Requests: No Special Requests        GRAM STAIN Occasional WBCs seen         No organisms seen        Culture result:       No growth thus far holding 7 days          CULTURE, BLOOD, PAIRED [[id number]] Collected: 09/03/21 0700    Order Status: Completed Specimen: Blood Updated: 09/09/21 0740     Special Requests: No Special Requests        Culture result: No growth 6 days       COVID-19 RAPID TEST [[id number]] Collected: 09/03/21 0636    Order Status: Completed Specimen: Nasopharyngeal Updated: 09/03/21 0741     Specimen source Nasopharyngeal        COVID-19 rapid test Not Detected        Comment: Rapid Abbott ID Now   Rapid NAAT:  The specimen is NEGATIVE for SARS-CoV-2, the novel coronavirus associated with COVID-19.    Negative results should be treated as presumptive and, if inconsistent with clinical signs and symptoms or necessary for patient management, should be tested with an alternative molecular assay. Negative results do not preclude SARS-CoV-2 infection and should not be used as the sole basis for patient management decisions. This test has been authorized by the FDA under   an Emergency Use Authorization (EUA) for use by authorized laboratories. Fact sheet for Healthcare Providers: [employer] Fact sheet for Patients: [employer]nz   Methodology: Isothermal Nucleic Acid Amplification                  Diagnostics   CXR Results  (Last 48 hours)    None         Assessment/Plan     1. 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 [place]: Suspect Pre-renal azotemia which should improve w fluids       Will renally adjust Acyclovir dosing if worsening     3. Seizure on admission: Due to # 1. On Keppra, and Valproic, EEG ordered      4. AMS: Due to # 1, follows simple commands but very slow to respond     5. New onset left sided weakness: Due to # 1, stable right temporal lobe edema on repeat CT today (09/09)       Still w left sided weakness, no strength compromise on right side       On Decadron 4 mg q 6 hours per neurology     6. DVT of axillary vein/Thrombosis of left basilic vein: Anticoagulated on low dose Eliquis     7. Closely monitor for bacteria superinfection.  At risk for aspiration PNA given decreased responsiveness, aspiration precaution initiated     Dispo: Agree w neurology HSV 1 encephalitis carries a karime mortality/morbidity, but will continue optimal mgt per guidelines              Daughter up date over the phone on clinical status per her request, will up date in person when she gets here later in the day     Juani Zelaya MD    9/9/2021 [facility]

## 2021-11-10 NOTE — ED PROVIDER NOTE - CONSTITUTIONAL NEGATIVE STATEMENT, MLM
Detail Level: Generalized
Quality 130: Documentation Of Current Medications In The Medical Record: Current Medications Documented
no fever and no chills.

## 2022-01-11 NOTE — ED ADULT NURSE NOTE - NS PRO AD BILL OF RIGHTS
Hospitalist Discharge Summary   Admit Date:  2022 10:48 AM   DC Note date: 2022  Name:  Sarah Kendrick   Age:  82 y.o. Sex:  female  :  3/11/1921   MRN:  [id number]   Room:  [facility]  PCP:  Solange Nguyen MD    Presenting Complaint: Lethargy    Initial Admission Diagnosis: CAP (community acquired pneumonia) [J18.9]     Problem List for this Hospitalization:  Hospital Problems as of 2022 Date Reviewed: 2021          Codes Class Noted - Resolved POA    * (Principal) CAP (community acquired pneumonia) ICD-10-CM: J18.9  ICD-9-CM: 486  2022 - Present Unknown        DM2 (diabetes mellitus, type 2) ([facility].) ICD-10-CM: E11.9  ICD-9-CM: 250.00  2022 - Present Unknown        Weakness generalized ICD-10-CM: R53.1  ICD-9-CM: 780.79  2022 - Present Unknown        Sacral wound ICD-10-CM: S31.000A  ICD-9-CM: 959.19  2022 - Present Unknown        Hypertension ICD-10-CM: I10  ICD-9-CM: 401.9  Unknown - Present Yes        Atrial fibrillation with RVR ([facility].) ICD-10-CM: I48.91  ICD-9-CM: 427.31  2011 - Present Unknown        Acquired hypothyroidism ICD-10-CM: E03.9  ICD-9-CM: 244.9  2011 - Present Unknown            Did Patient have Sepsis (YES OR NO): NO    Hospital Course:  Glenys Hawley a 82 y. o. female with medical history of atrial fibrillation on Xarelto, hypothyroidism, CHF, hypertension, pacemaker who presented with confusion, generalized weaknes and shortness of breath. CT head/spine negative for any acute abnormalities. Chest x-ray showed increasing bilateral infiltrates. CT showed bilateral pulmonary infiltrates and effusion. Patient admitted with possible community-acquired pneumonia. COVID-19 negative. Patient started on empiric antibiotics for CAP coverage. Also started on diuresis. Patient noted with A. fib with RVR on admission and had been given one-time 10 mg IV dose of Cardizem in the ED.  She was started on Cardizem and continued home digoxin, metoprolol with Yes improvement in her heart rates. Echocardiogram showed ejection fraction 60 to 65% and severely dilated left atrium, diastolic function indetermined. Electrolytes repleted as needed. Patient symptoms have been significantly improved. CODE STATUS discussed during the stay and patient and family opted for DNR. All other chronic comorbidities stable and we continued essential home meds. PT/OT recommended short-term rehab. Case management consult. Patient is stable to discharge home today with close follow-up with PCP and cardiology. Disposition: Skilled Nursing Facility  Diet: ADULT DIET Regular; Low Fat/Low Chol/High Fiber/SUDHA; No Salt Added (3-4 gm); 1500 ml  Code Status: DNR    Follow Up Orders: Follow-up Appointments   Procedures    FOLLOW UP VISIT Appointment in: 3 - 5 Days PCP and cardiology     PCP and cardiology     Standing Status:   Standing     Number of Occurrences:   1     Order Specific Question:   Appointment in     Answer:   3 - 5 Days       Follow-up Information     Follow up With Specialties Details Why Silverio Rubalcava [address] [place] [address] Internal Medicine   [address] [place] [address]  [phone]            Follow up labs/diagnostics (ultimately defer to outpatient provider):  Cardiology follow up    Time spent in patient discharge and coordination 36 minutes. Plan was discussed with patient. All questions answered. Patient was stable at time of discharge. Instructions given to call a physician or return if any concerns. Discharge Info:   Current Discharge Medication List      START taking these medications    Details   dilTIAZem ER (CARDIZEM CD) 180 mg capsule Take 1 Capsule by mouth daily for 30 days.   Qty: 30 Capsule, Refills: 0  Start date: 1/11/2022, End date: 2/10/2022      doxycycline (VIBRAMYCIN) 100 mg capsule Take 1 Capsule by mouth every twelve (12) hours for 6 doses. Qty: 6 Capsule, Refills: 0  Start date: 1/11/2022, End date: 1/14/2022      cefpodoxime (VANTIN) 200 mg tablet Take 1 Tablet by mouth two (2) times a day for 3 days. Qty: 6 Tablet, Refills: 0  Start date: 1/11/2022, End date: 1/14/2022         CONTINUE these medications which have NOT CHANGED    Details   omeprazole (PRILOSEC) 40 mg capsule TAKE ONE CAPSULE BY MOUTH EVERY DAY  Qty: 30 Capsule, Refills: 2      lovastatin (MEVACOR) 20 mg tablet TAKE 1 TABLET BY MOUTH EVERY DAY IN THE EVENING  Qty: 90 Tablet, Refills: 3      digoxin (LANOXIN) 0.125 mg tablet 0.125 mg. The pt takes 1/2 of the 0.125 mg  dose      azelastine (ASTELIN) 137 mcg (0.1 %) nasal spray 1 [place] by Both Nostrils route two (2) times a day. Use in each nostril as directed  Qty: 1 Each, Refills: 1      levothyroxine (SYNTHROID) 50 mcg tablet TAKE 1 TABLET BY MOUTH DAILY  Qty: 90 Tablet, Refills: 3    Comments: Pharmacy request 90 day supply      fexofenadine (ALLEGRA) 180 mg tablet Take 180 mg by mouth daily. metoprolol tartrate (LOPRESSOR) 25 mg tablet Take 75 mg by mouth two (2) times a day. Comments: Decreased by DR Emma Camacho. furosemide (LASIX) 40 mg tablet Take 40 mg by mouth daily. rivaroxaban (XARELTO) 15 mg tab tablet Take 1 Tab by mouth daily. Qty: 1 Tab, Refills: 0    Comments: STARTED BY DR Emma Camacho      STOOL SOFTENER 100 mg capsule TAKE ONE CAPSULE BY MOUTH TWICE A DAY  Qty: 60 Cap, Refills: 11      calcium carbonate (CALTREX) 600 mg calcium (1,500 mg) tablet Take 1 Tab by mouth daily. denosumab (PROLIA) 60 mg/mL injection 60 mg by SubCUTAneous route Once a year. Twice a year      acetaminophen (TYLENOL EXTRA STRENGTH) 500 mg tablet Take 500 mg by mouth every six (6) hours as needed for Pain.      polyethylene glycol (MIRALAX) 17 gram/dose powder Take 17 g by mouth nightly.       albuterol (PROVENTIL VENTOLIN) 2.5 mg /3 mL (0.083 %) nebu 1.5 mL by Nebulization route two (2) times a day. Indications: wheezing  Qty: 60 Nebule, Refills: 1    Associated Diagnoses: Cough; Wheezing; Simple chronic bronchitis (HCC)             Procedures done this admission:  * No surgery found *    Consults this admission:  IP CONSULT TO CARDIOLOGY    Echocardiogram/EKG results:  Results from Hospital Encounter encounter on 01/07/22    ECHO ADULT COMPLETE    Interpretation Summary    Left Ventricle: Left ventricle size is normal. Mildly increased wall thickness. Normal wall motion. The EF by visual approximation is 60 - 65%.   Aortic Valve: Mild to moderate transvalvular regurgitation.   Mitral Valve: There is moderate posterior leaflet prolapse with resultant anteriorly directed mitral regurgitation which is moderate to severe in severity. The eccentric nature of the jet may underestimate the severity.   Left Atrium: Left atrium is severely dilated. EKG Results     Procedure 720 Value Units Date/Time    EKG, 12 LEAD, INITIAL [[id number]] Collected: 01/07/22 1142    Order Status: Completed Updated: 01/07/22 1352     Ventricular Rate 122 BPM      Atrial Rate 146 BPM      QRS Duration 103 ms      Q-T Interval 335 ms      QTC Calculation (Bezet) 478 ms      Calculated R Axis 63 degrees      Calculated T Axis -38 degrees      Diagnosis --     Atrial fibrillation  Premature ventricular complexes  Non specific ST/T wave changes    Confirmed by Matilde Wheat MD ()MELQUIADES ([id number]) on 1/7/2022 1:52:21 PM            Diagnostic Imaging/Tests:   CT HEAD WO CONT    Result Date: 1/7/2022  CT of the Head and Cervical Spine INDICATION:  Increasing weakness and confusion Multiple axial images obtained through the brain without intravenous contrast. High resolution axial images were then obtained through the cervical spine. Coronal and sagittal reformats were also evaluated. Radiation dose reduction techniques were used for this study:   All CT scans performed at this facility use one or all of the following: Automated exposure control, adjustment of the mA and/or kVp according to patient's size, iterative reconstruction. Head CT:  No areas of abnormal attenuation are seen in the brain. There is no CT evidence of acute hemorrhage or infarction. The ventricles are normal in size. There are no extra-axial fluid collections. No masses are seen. There are no bony lesions. Cervical Spine CT:  There is no evidence of fracture or subluxation. No bony lesions are seen. There is no prevertebral soft tissue swelling. There is mild degenerative disc disease in the lower cervical spine. There are bilateral pulmonary infiltrates and effusions. 1.  No CT evidence of acute intracranial abnormality. 2.  No evidence of cervical spine fracture. 3.  Bilateral pulmonary infiltrates and effusions. CT SPINE CERV WO CONT    Result Date: 1/7/2022  CT of the Head and Cervical Spine INDICATION:  Increasing weakness and confusion Multiple axial images obtained through the brain without intravenous contrast. High resolution axial images were then obtained through the cervical spine. Coronal and sagittal reformats were also evaluated. Radiation dose reduction techniques were used for this study: All CT scans performed at this facility use one or all of the following: Automated exposure control, adjustment of the mA and/or kVp according to patient's size, iterative reconstruction. Head CT:  No areas of abnormal attenuation are seen in the brain. There is no CT evidence of acute hemorrhage or infarction. The ventricles are normal in size. There are no extra-axial fluid collections. No masses are seen. There are no bony lesions. Cervical Spine CT:  There is no evidence of fracture or subluxation. No bony lesions are seen. There is no prevertebral soft tissue swelling. There is mild degenerative disc disease in the lower cervical spine. There are bilateral pulmonary infiltrates and effusions.      1.  No CT evidence of acute intracranial abnormality. 2.  No evidence of cervical spine fracture. 3.  Bilateral pulmonary infiltrates and effusions. XR CHEST PORT    Result Date: 1/7/2022  Chest X-ray INDICATION: Increasing weakness and shortness of breath A portable AP view of the chest was obtained. FINDINGS: There are increased bilateral pulmonary infiltrates, right slightly greater than left. There is stable cardiomegaly. Pacemaker is present. Increased bilateral pulmonary infiltrate       All Micro Results     Procedure Component Value Units Date/Time    SARS-COV-2, PCR [[id number]] Collected: 01/09/22 1710    Order Status: Completed Specimen: Nasopharyngeal Updated: 01/10/22 0941     Specimen source Nasopharyngeal        SARS-CoV-2 Not detected        Comment:      The specimen is NEGATIVE for SARS-CoV-2, the novel coronavirus associated with COVID-19. This test has been authorized by the FDA under an Emergency Use Authorization (EUA) for use by authorized laboratories. Fact sheet for Healthcare Providers: MyBuysdate.co.nz       Fact sheet for Patients: MyBuysdate.co.nz       Methodology: RT-PCR         BLOOD CULTURE [[id number]] Collected: 01/07/22 1157    Order Status: Completed Specimen: Blood Updated: 01/10/22 0716     Special Requests: --        RIGHT  Antecubital       Culture result: NO GROWTH 3 DAYS       BLOOD CULTURE [[id number]] Collected: 01/07/22 1158    Order Status: Completed Specimen: Blood Updated: 01/10/22 0716     Special Requests: --        LEFT  ARM       Culture result: NO GROWTH 3 DAYS       COVID-19 RAPID TEST [[id number]] Collected: 01/07/22 1157    Order Status: Completed Specimen: Nasopharyngeal Updated: 01/07/22 1231     Specimen source NASAL        COVID-19 rapid test Not detected        Comment:      The specimen is NEGATIVE for SARS-CoV-2, the novel coronavirus associated with COVID-19.   A negative result does not rule out COVID-19. This test has been authorized by the FDA under an Emergency Use Authorization (EUA) for use by authorized laboratories.         Fact sheet for Healthcare Providers: ConventionUpdate.co.nz  Fact sheet for Patients: ConventionUpdate.co.nz       Methodology: Isothermal Nucleic Acid Amplification               Labs: Results:       BMP, Mg, Phos Recent Labs     01/11/22  0607 01/10/22  0543 01/09/22  0958    140 137   K 3.7 3.9 3.7    106 102   CO2 32 26 28   AGAP 4* 8 7   BUN 14 16 17   CREA 1.00 0.90 1.18*   CA 8.9 8.6 8.3   * 153* 185*      CBC Recent Labs     01/11/22  0607 01/10/22  0543 01/09/22  0958   WBC 9.8 11.5* 10.9   RBC 4.32 4.36 3.84*   HGB 13.1 12.8 11.5*   HCT 41.3 40.8 36.5    403 404   GRANS 66 70 72   LYMPH 14 10* 9*   EOS 10* 10* 10*   MONOS 9 9 8   BASOS 1 1 1   IG 1 0 1   ANEU 6.5 8.0 7.8   ABL 1.4 1.2 1.0   JENNIFER 1.0* 1.2* 1.1*   ABM 0.9 1.0 0.9   ABB 0.1 0.1 0.1   AIG 0.1 0.1 0.1      LFT Recent Labs     01/11/22  0607 01/10/22  0543 01/09/22  0958   ALT 21 26 20   AP 88 86 91   TP 6.5 6.4 6.0*   ALB 2.3* 2.2* 2.2*   GLOB 4.2* 4.2* 3.8*   AGRAT 0.5* 0.5* 0.6*      Cardiac Testing Lab Results   Component Value Date/Time     12/30/2013 04:50 AM     (H) 08/23/2008 05:01 AM    CK - MB 2.5 08/23/2008 05:01 AM    CK-MB Index 0.9 08/23/2008 05:01 AM    Troponin-I, Qt. <0.04 (L) 08/23/2008 08:27 AM    Troponin-I, Qt. <0.04 (L) 08/23/2008 05:01 AM      Coagulation Tests Lab Results   Component Value Date/Time    Prothrombin time 17.1 (H) 01/08/2022 05:14 AM    Prothrombin time 9.4 08/18/2011 03:15 PM    INR 1.4 01/08/2022 05:14 AM    INR 0.9 08/18/2011 03:15 PM    aPTT 48.4 (H) 01/08/2022 05:14 AM    aPTT 29.9 08/18/2011 03:15 PM      A1c Lab Results   Component Value Date/Time    Hemoglobin A1c 7.9 (H) 11/29/2021 03:41 PM    Hemoglobin A1c 7.0 (H) 05/25/2021 03:20 PM    Hemoglobin A1c 7.9 (H) 11/23/2020 03:40 PM Hemoglobin A1c, External 7.0 07/22/2014 12:00 AM      Lipid Panel Lab Results   Component Value Date/Time    Cholesterol, total 134 05/25/2021 03:20 PM    HDL Cholesterol 44 05/25/2021 03:20 PM    LDL, calculated 60 05/25/2021 03:20 PM    LDL, calculated 63 07/05/2018 09:25 AM    VLDL, calculated 30 05/25/2021 03:20 PM    VLDL, calculated 25 07/05/2018 09:25 AM    Triglyceride 178 (H) 05/25/2021 03:20 PM      Thyroid Panel Lab Results   Component Value Date/Time    TSH 2.760 05/25/2021 03:20 PM    TSH 2.540 07/29/2020 01:22 PM    TSH 2.040 04/22/2019 01:37 PM    TSH 1.900 07/05/2018 09:25 AM    T4, Free 0.98 07/05/2016 11:03 AM    T4, Free 1.02 08/18/2015 08:49 AM        Most Recent UA Lab Results   Component Value Date/Time    Color YELLOW 01/07/2022 01:56 PM    Appearance CLEAR 01/07/2022 01:56 PM    Specific gravity 1.011 06/07/2015 04:00 PM    pH (UA) 5.5 01/07/2022 01:56 PM    Protein Negative 01/07/2022 01:56 PM    Glucose Negative 01/07/2022 01:56 PM    Ketone Negative 01/07/2022 01:56 PM    Bilirubin Negative 01/07/2022 01:56 PM    Blood TRACE (A) 01/07/2022 01:56 PM    Urobilinogen 0.2 01/07/2022 01:56 PM    Nitrites Negative 01/07/2022 01:56 PM    Leukocyte Esterase Negative 01/07/2022 01:56 PM    WBC 0-5 01/06/2022 04:08 PM    RBC None seen 01/06/2022 04:08 PM    Epithelial cells 0-10 01/06/2022 04:08 PM    Bacteria 0 01/07/2022 01:56 PM    Casts 3-5 03/16/2020 03:16 PM    Crystals, urine 0 03/16/2020 03:16 PM    Mucus 0 03/16/2020 03:16 PM    Other observations RESULTS VERIFIED MANUALLY 01/07/2022 01:56 PM          All Labs from Last 24 Hrs:  Recent Results (from the past 24 hour(s))   CBC WITH AUTOMATED DIFF    Collection Time: 01/11/22  6:07 AM   Result Value Ref Range    WBC 9.8 4.3 - 11.1 K/uL    RBC 4.32 4.05 - 5.2 M/uL    HGB 13.1 11.7 - 15.4 g/dL    HCT 41.3 35.8 - 46.3 %    MCV 95.6 79.6 - 97.8 FL    MCH 30.3 26.1 - 32.9 PG    MCHC 31.7 31.4 - 35.0 g/dL    RDW 14.8 (H) 11.9 - 14.6 % PLATELET 808 961 - 705 K/uL    MPV 9.4 9.4 - 12.3 FL    ABSOLUTE NRBC 0.00 0.0 - 0.2 K/uL    DF AUTOMATED      NEUTROPHILS 66 43 - 78 %    LYMPHOCYTES 14 13 - 44 %    MONOCYTES 9 4.0 - 12.0 %    EOSINOPHILS 10 (H) 0.5 - 7.8 %    BASOPHILS 1 0.0 - 2.0 %    IMMATURE GRANULOCYTES 1 0.0 - 5.0 %    ABS. NEUTROPHILS 6.5 1.7 - 8.2 K/UL    ABS. LYMPHOCYTES 1.4 0.5 - 4.6 K/UL    ABS. MONOCYTES 0.9 0.1 - 1.3 K/UL    ABS. EOSINOPHILS 1.0 (H) 0.0 - 0.8 K/UL    ABS. BASOPHILS 0.1 0.0 - 0.2 K/UL    ABS. IMM. GRANS. 0.1 0.0 - 0.5 K/UL   METABOLIC PANEL, COMPREHENSIVE    Collection Time: 01/11/22  6:07 AM   Result Value Ref Range    Sodium 139 136 - 145 mmol/L    Potassium 3.7 3.5 - 5.1 mmol/L    Chloride 103 98 - 107 mmol/L    CO2 32 21 - 32 mmol/L    Anion gap 4 (L) 7 - 16 mmol/L    Glucose 147 (H) 65 - 100 mg/dL    BUN 14 8 - 23 MG/DL    Creatinine 1.00 0.6 - 1.0 MG/DL    GFR est AA >60 >60 ml/min/1.73m2    GFR est non-AA 54 (L) >60 ml/min/1.73m2    Calcium 8.9 8.3 - 10.4 MG/DL    Bilirubin, total 0.7 0.2 - 1.1 MG/DL    ALT (SGPT) 21 12 - 65 U/L    AST (SGOT) 23 15 - 37 U/L    Alk.  phosphatase 88 50 - 136 U/L    Protein, total 6.5 6.3 - 8.2 g/dL    Albumin 2.3 (L) 3.2 - 4.6 g/dL    Globulin 4.2 (H) 2.3 - 3.5 g/dL    A-G Ratio 0.5 (L) 1.2 - 3.5         Current Med List in Hospital:   Current Facility-Administered Medications   Medication Dose Route Frequency    cefepime (MAXIPIME) 2 g in 0.9% sodium chloride (MBP/ADV) 100 mL MBP  2 g IntraVENous Q24H    metoprolol tartrate (LOPRESSOR) tablet 100 mg  100 mg Oral BID    furosemide (LASIX) injection 40 mg  40 mg IntraVENous DAILY    doxycycline (VIBRAMYCIN) capsule 100 mg  100 mg Oral Q12H    dilTIAZem IR (CARDIZEM) tablet 30 mg  30 mg Oral AC&HS    cyclobenzaprine (FLEXERIL) tablet 5 mg  5 mg Oral TID PRN    rivaroxaban (XARELTO) tablet 15 mg  15 mg Oral DAILY WITH BREAKFAST    pantoprazole (PROTONIX) tablet 40 mg  40 mg Oral ACB    atorvastatin (LIPITOR) tablet 10 mg  10 mg Oral DAILY    levothyroxine (SYNTHROID) tablet 50 mcg  50 mcg Oral 6am    calcium carbonate (TUMS) chewable tablet 600 mg [elemental]  600 mg Oral DAILY WITH LUNCH    digoxin (LANOXIN) tablet 0.0625 mg  0.0625 mg Oral DAILY    sodium chloride (NS) flush 5-40 mL  5-40 mL IntraVENous Q8H    sodium chloride (NS) flush 5-40 mL  5-40 mL IntraVENous PRN    acetaminophen (TYLENOL) tablet 650 mg  650 mg Oral Q6H PRN    Or    acetaminophen (TYLENOL) suppository 650 mg  650 mg Rectal Q6H PRN    polyethylene glycol (MIRALAX) packet 17 g  17 g Oral DAILY PRN    ondansetron (ZOFRAN ODT) tablet 4 mg  4 mg Oral Q8H PRN    Or    ondansetron (ZOFRAN) injection 4 mg  4 mg IntraVENous Q6H PRN       Allergies   Allergen Reactions    Erythromycin Other (comments)     Upsets entire system    Sulfa (Sulfonamide Antibiotics) Other (comments)     Kidneys to crystalize     Immunization History   Administered Date(s) Administered    COVID-19, Moderna, Primary or Immunocompromised Series, MRNA, PF, 100mcg/0.5mL 01/13/2021, 02/10/2021, 10/26/2021    Influenza High Dose Vaccine PF 12/21/2015, 10/05/2016, 10/05/2017, 10/11/2018, 10/12/2021    Influenza Vaccine (Tri) Adjuvanted (>65 Yrs FLUAD TRI 23220) 09/06/2019    Influenza, Quadrivalent, Adjuvanted (>65 Yrs FLUAD QUAD 72588) 09/15/2020    Pneumococcal Conjugate (PCV-13) 08/11/2015    Pneumococcal Polysaccharide (PPSV-23) 09/25/2007    TB Skin Test (PPD) Intradermal 12/30/2013, 05/11/2015, 01/09/2022    Td 03/21/2004    Tdap 10/11/2016       Recent Vital Data:  Patient Vitals for the past 24 hrs:   Temp Pulse Resp BP SpO2   01/11/22 0752 97.3 °F (36.3 °C) 87 18 109/73 92 %   01/11/22 0540  85      01/11/22 0339 97.6 °F (36.4 °C) 99 18 109/74 91 %   01/10/22 2331 97.6 °F (36.4 °C) 95 17 (!) 122/93 91 %   01/10/22 2120  95  99/76    01/10/22 1922 98.5 °F (36.9 °C) (!) 113 19 (!) 92/54 90 %   01/10/22 1537 98.4 °F (36.9 °C) 73 18 119/76 92 %   01/10/22 1110 97.6 °F (36.4 °C) (!) 102 18 (!) 107/55 95 %     Oxygen Therapy  O2 Sat (%): 92 % (01/11/22 0752)  Pulse via Oximetry: 124 beats per minute (01/07/22 1525)  O2 Device: None (Room air) (01/09/22 2325)  O2 Flow Rate (L/min): 3 l/min (01/07/22 1133)    Estimated body mass index is 23.81 kg/m² as calculated from the following:    Height as of this encounter: 5' 6\" (1.676 m). Weight as of this encounter: 66.9 kg (147 lb 7.8 oz). Intake/Output Summary (Last 24 hours) at 1/11/2022 0804  Last data filed at 1/10/2022 1022  Gross per 24 hour   Intake    Output 500 ml   Net -500 ml         Physical Exam:    General:    Well nourished. No overt distress  Head:  Normocephalic, atraumatic  Eyes:  Sclerae appear normal.  Pupils equally round. HENT:  Nares appear normal, no drainage. Moist mucous membranes  Neck:  No restricted ROM. Trachea midline  CV:   Irregularly irregular. No m/r/g. No JVD  Lungs:   CTAB. No wheezing, rhonchi, or rales. Even, unlabored  Abdomen:   Soft, nontender, nondistended. Extremities: Warm and dry. No cyanosis or clubbing. No edema. Skin:     No rashes. Normal coloration  Neuro:  CN II-XII grossly intact. Psych:  Normal mood and affect. Signed:  Sharon Medina MD    Part of this note may have been written by using a voice dictation software. The note has been proof read but may still contain some grammatical/other typographical errors.

## 2022-05-23 NOTE — PROCEDURE
High fiber diet - 25 grams per day.  Emphasis on whole grain breads such as double fiber wheat bread and high fiber cereals and bars.    Drink 8 glasses of water per day 64 - 96 oz per day  Fiber supplements Metamucil 2 tsp daily  Regular exercise - 30 min brisk walking 5 days per week  OV 6 weeks    
[FreeTextEntry1] : \par FENO 18  ppb and   and prior 73 ppb \par PFT Nov 13 2019\par Moderate obstructive ventilatory impairment\par No  response to bronchodilator at FEV1\par Normal lung volumes\par  Mild Moderate reduction diffusion 63% of predicted with a loss of functioning alveolocapillary units\par Compared to April 4, 2019 there is interval improvement of flow rates diffusion and stable total lung capacity\par \par EKG 4/18/2019\par NSR \par r/o old anterior wall MI \par Noted no change compared EKG 2016\par \par Blood Draw\par Increase Synthroid to 50 mcg Saturday and Sunday and continue 25 mc data review April 19, 2019\par CBC White blood count 5.15 hemoglobin 12.5 hematocrit 39.4 .8\par Platelet count 256,000\par Hemoglobin A1c 5.2 with mean plasma glucose 103\par Vitamin D 25.3 data review April 19, 2019\par CBC\par White blood count just above normal 12.76 hemoglobin 13.9 hematocrit 41.1 platelet count 291,000\par Hemoglobin A1c 5.7% with mean plasma glucose 117\par HCV weakly reactive  and pending HCV RNA qualitative test - NOTED HCV RNA is Negative\par Lipid profile\par Cholesterol 146 HDL 31 triglycerides 99 LDL 95\par PSA 0.58\par T4 free T4 TSH normal\par TSH 0.60\par Vitamin D 19.4 \par Serum electrolytes are normal\par Renal function normal\par BUN 12 creatinine 0.83\par AST 33\par ALT 48\par Alkaline phosphatase bilirubin normal \par \par  Prevnar IM   9/18/19\par High-dose flu vaccination administered November 13, 2019

## 2023-05-24 NOTE — ED ADULT NURSE NOTE - CAS EDN DISCHARGE ASSESSMENT
Tolerated infusion well. Reviewed therapy plan, offered education material and/or discharge material, reviewed medication information and signs and symptoms  and educated on possible side effects, verbalizes good knowledge of current plan patient verbalizes understanding, and has no signs or symptoms to report at this time. Patient discharged. Patient alert and oriented x3. No distress noted. Vital signs stable. Patient denies any new or worsening pain. Patient denies any needs. All questions answered. Next appointment scheduled. Declines copy of AVS. Patient stayed for 30 minute observation after completion of infusion. Alert and oriented to person, place and time

## 2025-01-08 NOTE — CONSULT NOTE ADULT - SUBJECTIVE AND OBJECTIVE BOX
ICU CONSULT  Location of Patient : [facility] ([facility])  Attending requesting Consult:Robert Espinoza  Chief Complaint :     Reason For consult :      Initial HPI on admission:  HPI:  75M hx of COPD, HTN, on methadone for chronic back pain and compression fx, metastatic small cell lung cancer MIRELLA with mediastinal LAD, liver, adrenal mets s/p chemo last over 3 months ago with improvement in recent CT now on immunotherapy q3 weeks (next due ) followed at [facility]. He presents with increasing SOB and cough for over a week and had a CT chest angio 2020 ordered by oncologist with noted RML, RLL and LLL pneumonia and negative PE (pt has copy of report on his phone) and was treated with Levaquin and  maintained on Pred 15mg qd. Was also treated with doxy and prednisone in mid December for similar sxs. Initially had some yellow phlegm with occ blood streaks but this am had about 2 episodes of hemoptysis with about a tsp of blood clot each time and was seen in ED earlier today and D/C. Upon return home, this evening had a coughing fit and had about 6-7 'chunks' of blood clots about a tsp each assoc with worsening SOB and called EMS. +subjective fevers, chills, +R sided rib pain if severe cough, +wheezing. Denied CP, NVD, dysuria, loss of taste or smell. EMS noted sat 80% on RA. In ED, febrile 100.6 P:90 sat 100% on NRB now 97% on 4LNC. wbc:15.8 lact:2.2 INR:1.39 s/p sepsis fluids and IV Levaquin in ED>     Pt related just received oxygen equipment today but had not started use.   Denied asa or nsaid use.  (2021 01:01)      BRIEF HOSPITAL COURSE: States hemoptysis started yesterday with blood clots upon coughing about 1 teaspoon. No hemoptysis reported today. At baseline dyspneic with exertion at home. Recently started on home oxygen 2 lpm via NC.    PAST MEDICAL & SURGICAL HISTORY:  Multifocal pneumonia    Hypertension, unspecified type    Small cell carcinoma of lung      Allergies    amoxicillin (Hives)    Intolerances      FAMILY HISTORY:    Social history:      Smoking: Former smoker     Drinking: Denies      Drug use: Denies    Review of Systems:  All other systems reviewed, as per above    Medications:  acetaminophen   Tablet .. 650 milliGRAM(s) Oral every 6 hours PRN Temp greater or equal to 38C (100.4F), Mild Pain (1 - 3)  ALBUTerol    90 MICROgram(s) HFA Inhaler 2 Puff(s) Inhalation every 6 hours  amLODIPine   Tablet 10 milliGRAM(s) Oral daily  azithromycin  IVPB      budesonide 160 MICROgram(s)/formoterol 4.5 MICROgram(s) Inhaler 2 Puff(s) Inhalation two times a day  cefTRIAXone   IVPB 1000 milliGRAM(s) IV Intermittent every 24 hours  methadone    Tablet 15 milliGRAM(s) Oral daily  pantoprazole    Tablet 40 milliGRAM(s) Oral before breakfast  predniSONE   Tablet 40 milliGRAM(s) Oral daily    MEDICATIONS  (STANDING):  ALBUTerol    90 MICROgram(s) HFA Inhaler 2 Puff(s) Inhalation every 6 hours  amLODIPine   Tablet 10 milliGRAM(s) Oral daily  azithromycin  IVPB      budesonide 160 MICROgram(s)/formoterol 4.5 MICROgram(s) Inhaler 2 Puff(s) Inhalation two times a day  cefTRIAXone   IVPB 1000 milliGRAM(s) IV Intermittent every 24 hours  methadone    Tablet 15 milliGRAM(s) Oral daily  pantoprazole    Tablet 40 milliGRAM(s) Oral before breakfast  predniSONE   Tablet 40 milliGRAM(s) Oral daily    MEDICATIONS  (PRN):  acetaminophen   Tablet .. 650 milliGRAM(s) Oral every 6 hours PRN Temp greater or equal to 38C (100.4F), Mild Pain (1 - 3)      Home Medications:  Last Order Reconciliation Date: Not Done  Albuterol (Eqv-ProAir HFA) 90 mcg/inh inhalation aerosol: 2 puff(s) inhaled every 6 hours (21 @ 01:42)  amLODIPine 10 mg oral tablet: 1 tab(s) orally once a day (21 @ 16:53)  Levaquin 500 mg oral tablet: 1 tab(s) orally every 24 hours (21 @ 16:53)  methadone 10 mg oral tablet: 1.5 tab(s) orally once a day (21 @ 01:42)  methadone 5 mg oral tablet: 2.5 tab(s) orally once a day (21 @ 00:42)  methadone 5 mg oral tablet: 3 tab(s) orally once a day (21 @ 01:42)  predniSONE 10 mg oral tablet: 1 tab(s) orally once a day (21 @ 00:43)  predniSONE 5 mg oral tablet: 15 milligram(s) orally once a day (21 @ 01:42)  Symbicort 160 mcg-4.5 mcg/inh inhalation aerosol: 2 puff(s) inhaled 2 times a day (21 @ 01:42)      LABS:                        12.6   16.20 )-----------( 173      ( 2021 06:54 )             37.6         136  |  99  |  17  ----------------------------<  82  4.0   |  27  |  0.89    Ca    8.7      2021 06:54    TPro  6.6  /  Alb  2.9<L>  /  TBili  0.4  /  DBili  x   /  AST  40  /  ALT  48<H>  /  AlkPhos  62              PT/INR - ( 2021 23:15 )   PT: 16.6 sec;   INR: 1.39 ratio         PTT - ( 2021 23:15 )  PTT:25.6 sec  Urinalysis Basic - ( 2021 01:15 )    Color: Yellow / Appearance: Clear / S.015 / pH: x  Gluc: x / Ketone: Negative  / Bili: Negative / Urobili: Negative   Blood: x / Protein: Negative / Nitrite: Negative   Leuk Esterase: Negative / RBC: 26-50 /HPF / WBC Negative /HPF   Sq Epi: x / Non Sq Epi: Neg.-Few / Bacteria: Negative /HPF      VITALS:  T(C): 37.3 (21 @ 12:27), Max: 38.1 (21 @ 22:52)  T(F): 99.1 (21 @ 12:27), Max: 100.6 (21 @ 22:52)  HR: 89 (21 @ 12:27) (80 - 90)  BP: 127/65 (21 @ 12:27) (113/95 - 144/65)  BP(mean): 95 (21 @ 06:27) (95 - 95)  ABP: --  ABP(mean): --  RR: 18 (21 @ 12:27) (17 - 20)  SpO2: 91% (21 @ 12:27) (91% - 100%)  CVP(mm Hg): --  CVP(cm H2O): --    Ins and Outs     21 @ 07:01  -  21 @ 07:00  --------------------------------------------------------  IN: 0 mL / OUT: 1 mL / NET: -1 mL        Height (cm): 180.3 (21 @ 03:05), 177.8 (21 @ 16:47)  Weight (kg): 85.6 (21 @ 03:05), 83.9 (21 @ 16:47)  BMI (kg/m2): 26.3 (21 @ 03:05), 26.5 (21 @ 16:47)        I&O's Detail    2021 07:01  -  2021 07:00  --------------------------------------------------------  IN:  Total IN: 0 mL    OUT:    Voided (mL): 1 mL  Total OUT: 1 mL    Total NET: -1 mL          Physical Examination:  GENERAL:               Alert, Oriented, No acute distress.    HEENT:                    Pupils equal, reactive to light.  Symmetric. No JVD, Moist MM, no oral bleeding noted  PULM:                     Bilateral air entry, + right sided crackles,  No Rhonchi, No Wheezing  CVS:                         S1, S2,  No Murmur  ABD:                        Soft, nondistended, nontender, normoactive bowel sounds,   EXT:                         No edema, nontender, No Cyanosis or Clubbing   Vascular:                Warm Extremities, Normal Capillary refill, Normal Distal Pulses  SKIN:                       Warm and well perfused, no rashes noted.   NEURO:                  Alert, oriented, interactive, nonfocal, follows commands  PSYC:                      Calm, + Insight.    
[FreeTextEntry1] : high bp
[de-identified] : Ms. WEGENER is a 78 year old F with PMHx of HTN, Afib presenting for f/u. Pt reports bp is high in 160-180s in the morning, goes down to 140-150 during day. Is on labetalol 100mg AM, 200mg PM and losartan 50mg AM. Pt reports intermittent lightheadedness/dizziness and sometimes spinning on and off for years Denies chest pain, sob, gunn,, diaphoresis, palpitations, LE swelling, orthopnea, syncope, n/v.  Pt denies focal weakness, vision changes, numbness/tingling, dysphagia, dysarthria.
This is a 75 year old man with a history of COPD, HTN, chronic back pain, metastatic small cell lung cancer in the left upper lobe, metastasis to the liver, adrenal glands. Patient s/p chemotherapy which finished 3 months ago, now on maintainable immunotherapy Q 3 weeks.  Unclear which one, patient nor family could remember, could not find record on his [employer] portal [employer].  He only recalls it was recently approved in March 2020.  Given this information it was most likely Imfinzi (durvalumab).  Recent history of shortness of breath, CT angio ordered 1/4/2020 noted a RML, RLL and LLL pneumonia, negative PE. Treated with elvaquin and placed on prednisone 15mg.  Patient prestns for hemoptysis today, along with fevers, chills right sided rib pain POX 80%.  Repeat CT scan demonstrated no pulmonary emboli, left upper lobe mass no longer visualized, There are scattered bilateral patchy opacities with right lower lobe confluence.          MEDICATIONS  (STANDING):  ALBUTerol    90 MICROgram(s) HFA Inhaler 2 Puff(s) Inhalation every 6 hours  amLODIPine   Tablet 10 milliGRAM(s) Oral daily  azithromycin  IVPB      azithromycin  IVPB 500 milliGRAM(s) IV Intermittent every 24 hours  budesonide 160 MICROgram(s)/formoterol 4.5 MICROgram(s) Inhaler 2 Puff(s) Inhalation two times a day  cefTRIAXone   IVPB 1000 milliGRAM(s) IV Intermittent every 24 hours  heparin   Injectable 5000 Unit(s) SubCutaneous every 12 hours  methadone    Tablet 15 milliGRAM(s) Oral daily  pantoprazole    Tablet 40 milliGRAM(s) Oral before breakfast  predniSONE   Tablet 40 milliGRAM(s) Oral daily    MEDICATIONS  (PRN):  acetaminophen   Tablet .. 650 milliGRAM(s) Oral every 6 hours PRN Temp greater or equal to 38C (100.4F), Mild Pain (1 - 3)    PAST MEDICAL & SURGICAL HISTORY:  Multifocal pneumonia    Hypertension, unspecified type    Small cell carcinoma of lung      Vital Signs Last 24 Hrs  T(C): 36.7 (08 Jan 2021 20:50), Max: 38.6 (08 Jan 2021 00:47)  T(F): 98.1 (08 Jan 2021 20:50), Max: 101.4 (08 Jan 2021 00:47)  HR: 80 (08 Jan 2021 20:50) (71 - 86)  BP: 132/74 (08 Jan 2021 20:50) (107/62 - 132/74)  BP(mean): --  RR: 16 (08 Jan 2021 20:50) (15 - 16)  SpO2: 95% (08 Jan 2021 20:50) (94% - 98%)    01-08    134<L>  |  96  |  16  ----------------------------<  104<H>  3.6   |  29  |  0.99    Ca    8.5      08 Jan 2021 06:18    TPro  6.6  /  Alb  2.9<L>  /  TBili  0.4  /  DBili  x   /  AST  40  /  ALT  48<H>  /  AlkPhos  62  01-07                        11.9   14.01 )-----------( 150      ( 08 Jan 2021 05:45 )             36.4

## 2025-01-14 NOTE — PATIENT PROFILE ADULT - PHONE #
[phone]
Alert-The patient is alert, awake and responds to voice. The patient is oriented to time, place, and person. The triage nurse is able to obtain subjective information.